# Patient Record
Sex: FEMALE | Race: WHITE | NOT HISPANIC OR LATINO
[De-identification: names, ages, dates, MRNs, and addresses within clinical notes are randomized per-mention and may not be internally consistent; named-entity substitution may affect disease eponyms.]

---

## 2017-12-18 ENCOUNTER — RESULT REVIEW (OUTPATIENT)
Age: 77
End: 2017-12-18

## 2020-12-14 PROBLEM — Z00.00 ENCOUNTER FOR PREVENTIVE HEALTH EXAMINATION: Status: ACTIVE | Noted: 2020-12-14

## 2020-12-21 ENCOUNTER — EMERGENCY (EMERGENCY)
Facility: HOSPITAL | Age: 80
LOS: 1 days | Discharge: HOME | End: 2020-12-21
Attending: EMERGENCY MEDICINE | Admitting: EMERGENCY MEDICINE
Payer: MEDICARE

## 2020-12-21 ENCOUNTER — OUTPATIENT (OUTPATIENT)
Dept: OUTPATIENT SERVICES | Facility: HOSPITAL | Age: 80
LOS: 1 days | End: 2020-12-21
Payer: MEDICARE

## 2020-12-21 ENCOUNTER — APPOINTMENT (OUTPATIENT)
Dept: CT IMAGING | Facility: CLINIC | Age: 80
End: 2020-12-21
Payer: MEDICARE

## 2020-12-21 VITALS
DIASTOLIC BLOOD PRESSURE: 96 MMHG | TEMPERATURE: 98 F | OXYGEN SATURATION: 99 % | SYSTOLIC BLOOD PRESSURE: 195 MMHG | RESPIRATION RATE: 18 BRPM | WEIGHT: 149.91 LBS | HEIGHT: 64 IN | HEART RATE: 96 BPM

## 2020-12-21 VITALS
SYSTOLIC BLOOD PRESSURE: 151 MMHG | OXYGEN SATURATION: 99 % | TEMPERATURE: 98 F | RESPIRATION RATE: 18 BRPM | DIASTOLIC BLOOD PRESSURE: 72 MMHG | HEART RATE: 80 BPM

## 2020-12-21 DIAGNOSIS — Z00.8 ENCOUNTER FOR OTHER GENERAL EXAMINATION: ICD-10-CM

## 2020-12-21 PROCEDURE — 82565 ASSAY OF CREATININE: CPT

## 2020-12-21 PROCEDURE — 70491 CT SOFT TISSUE NECK W/DYE: CPT

## 2020-12-21 PROCEDURE — 96374 THER/PROPH/DIAG INJ IV PUSH: CPT

## 2020-12-21 PROCEDURE — 99284 EMERGENCY DEPT VISIT MOD MDM: CPT

## 2020-12-21 PROCEDURE — 99284 EMERGENCY DEPT VISIT MOD MDM: CPT | Mod: 25

## 2020-12-21 PROCEDURE — 70491 CT SOFT TISSUE NECK W/DYE: CPT | Mod: 26

## 2020-12-21 RX ORDER — DIPHENHYDRAMINE HCL 50 MG
1 CAPSULE ORAL
Qty: 9 | Refills: 0
Start: 2020-12-21 | End: 2020-12-23

## 2020-12-21 RX ORDER — FAMOTIDINE 10 MG/ML
20 INJECTION INTRAVENOUS ONCE
Refills: 0 | Status: COMPLETED | OUTPATIENT
Start: 2020-12-21 | End: 2020-12-21

## 2020-12-21 RX ORDER — FAMOTIDINE 10 MG/ML
1 INJECTION INTRAVENOUS
Qty: 10 | Refills: 0
Start: 2020-12-21 | End: 2020-12-30

## 2020-12-21 RX ADMIN — FAMOTIDINE 20 MILLIGRAM(S): 10 INJECTION INTRAVENOUS at 14:12

## 2020-12-21 NOTE — ED PROVIDER NOTE - NSFOLLOWUPINSTRUCTIONS_ED_ALL_ED_FT
1. FOLLOW UP WITH YOUR PRIMARY DOCTOR IN 24-48 HOURS.   2. FOLLOW UP WITH ALL SPECIALIST DISCUSSED DURING YOUR VISIT.   3. TAKE ALL MEDICATIONS PRESCRIBED IN THE ER IF ANY ARE PRESCRIBED. CONTINUE YOUR HOME MEDICATIONS UNLESS OTHERWISE ADVISED DIFFERENTLY.   4. RETURN FOR WORSENING SYMPTOMS OR CONCERNS INCLUDING BUT NOT LIMITED TO FEVER, CHEST PAIN, OR TROUBLE BREATHING OR ANY OTHER CONCERNS  YOU NOW HAVE AN ALLERGIC TO IV CONTRAST. DO NOT TAKE IV CONTRAST.   TAKE PREDNISONE 40MG ONCE DAILY FOR 5 DAYS  TAKE BENADRYL 25MG EVERY 8 HOURS AS NEEDED FOR ITCHING BE AWARE IT WILL MAKE TO ITCHY  TAKE PEPCID 20MG ONCE DAILY FOR 10 DAYS              GENERAL ALLERGIC REACTION - AfterCare(R) Instructions(ER/ED)           General Allergic Reaction    WHAT YOU NEED TO KNOW:    An allergic reaction is your body's response to an allergen. Allergens include medicines, food, insect stings, animal dander, mold, latex, chemicals, and dust mites. Pollen from trees, grass, and weeds can also cause an allergic reaction. An allergic reaction can range from mild to severe.    DISCHARGE INSTRUCTIONS:    Call 911 for signs or symptoms of anaphylaxis, such as trouble breathing, swelling in your mouth or throat, or wheezing. You may also have itching, a rash, hives, or feel like you are going to faint.    Return to the emergency department if:   •You have a skin rash, hives, swelling, or itching that is starting to get worse.      •Your throat tightens, or your lips or tongue swell.      •You have trouble swallowing or speaking.      •You have worsening nausea, diarrhea, or abdominal cramps, or you are vomiting.      •You have chest pain or tightness.      Contact your healthcare provider if:   •You have questions or concerns about your condition or care.          Medicines: You may need any of the following:   •Medicines may be given to relieve certain allergy symptoms such as itching, sneezing, and swelling. You may take them as a pill or use drops in your nose or eyes. Topical treatments may be given to put directly on your skin to help decrease itching or swelling.      •Epinephrine may be prescribed if you are at risk for anaphylaxis. This is a severe allergic reaction that can be life-threatening. Your healthcare provider will tell you if you need to keep epinephrine with you. You will be taught when and how to use it.      •Take your medicine as directed. Contact your healthcare provider if you think your medicine is not helping or if you have side effects. Tell him of her if you are allergic to any medicine. Keep a list of the medicines, vitamins, and herbs you take. Include the amounts, and when and why you take them. Bring the list or the pill bottles to follow-up visits. Carry your medicine list with you in case of an emergency.      Follow up with your healthcare provider as directed: Write down your questions so you remember to ask them during your visits.     Manage your symptoms:   •Avoid allergens. You may need to have allergy testing with your healthcare provider or a specialist to find your allergens.      •Use cold compresses on your skin or eyes. This will help soothe skin or eyes affected by the allergic reaction. You can make a cold compress by soaking a washcloth in cool water. Wring out the extra water before you apply the washcloth.      •Rinse your nasal passages with a saline solution. Daily rinsing may help clear allergens out of your nose. Use distilled water if possible. You can also boil tap water and then let it cool before you use it. Do not use tap water without boiling it first.      •Do not smoke. Nicotine and other chemicals in cigarettes and cigars can make an allergic reaction worse, and can also cause lung damage. Ask your healthcare provider for information if you currently smoke and need help to quit. E-cigarettes or smokeless tobacco still contain nicotine. Talk to your healthcare provider before you use these products.          © Copyright [email or web address] 2020           back to top                          © Copyright [email or web address] 2020

## 2020-12-21 NOTE — ED PROVIDER NOTE - NS_ ATTENDINGSCRIBEDETAILS _ED_A_ED_FT
Juan Aguiar MD - The scribe's documentation has been prepared under my direction and personally reviewed by me in its entirety. I confirm that the note above accurately reflects all work, treatment, procedures, and medical decision making performed by me.

## 2020-12-21 NOTE — ED ADULT TRIAGE NOTE - CHIEF COMPLAINT QUOTE
sent from radiology for allergic reaction to IV dye, had throat swelling, sob, given Epi inj, Benadryl, Solumedrol IV

## 2020-12-21 NOTE — ED PROVIDER NOTE - OBJECTIVE STATEMENT
pt is a 81yo female with pmhx of hypothyroid, dementia bib ems with allergic reaction. pt was having a ct soft tissue neck with ivc and started to get throat discomfort with tongue swelling and diffuse body itching. pt was given 1 epi pen and 25mg po benadryl  in facility and 125mg iv solumedrol per ems. pt reports since tx symptoms have improved. pt denies fever, cp ,sob, trouble swallowing, rash.

## 2020-12-21 NOTE — ED PROVIDER NOTE - CARE PROVIDER_API CALL
EBONIE SOLORIO A  Internal Medicine  [address] [place] [address], [address] [place] [address]  Phone: [phone]  Fax: [phone]  Follow Up Time: 1-3 Days

## 2020-12-21 NOTE — ED PROVIDER NOTE - PROGRESS NOTE DETAILS
81 y/o female with no pertinent PMHx presents to the ED s/p allergic reaction. Pt reports she was at outpatient radiology and receiving ct scan, injected with contrast which provoked allergic rxn. Pt reports throat swelling and SOB. Per EMS, pt given Epi injection, Benadryl, aand Solumedrol IV. Pstates condition improving, states she "feels tired". No other complaints at this time.  PE: no acute distress, Heart S1 S2 RRR, Lungs CTA. pt improved. advised pmd follow up. will rx prednisone , pepcid and benadryl. pt advised to follow up with pmd regarding abnormal results. All questions answered and concerns addressed. pt verbalized understanding and agreement with plan and dx. pt advised on next step and when/where to follow up. pt advised on all take home and otc medications. pt advised to follow up with PMD. pt advised to return to ed for worsenng symptoms including fever, cp, sob. will dc.

## 2020-12-21 NOTE — ED PROVIDER NOTE - PATIENT PORTAL LINK FT
You can access the FollowMyHealth Patient Portal offered by [facility] by registering at the following website: [email or web address]. By joining [employer]’s FollowMyHealth portal, you will also be able to view your health information using other applications (apps) compatible with our system.

## 2020-12-21 NOTE — ED PROVIDER NOTE - CLINICAL SUMMARY MEDICAL DECISION MAKING FREE TEXT BOX
pt is a 81yo female with pmhx of hypothyroid, dementia bib ems with allergic reaction. pt was having a ct soft tissue neck with ivc and started to get throat discomfort with tongue swelling and diffuse body itching. pt was given 1 epi pen and 25mg po benadryl  in facility and 125mg iv solumedrol per ems. pt reports since tx symptoms have improved. will give pepcid and observe. pt advised she is now allergic to IV CONTRAST.

## 2020-12-21 NOTE — ED ADULT NURSE NOTE - OBJECTIVE STATEMENT
81 yo female presents to the ED with , swollen tongue and difficulty swallowing s/p IV contrast at radiology outpatient, reports as soon as the IV contrast was pushed she started getting tremors and then felt her tongue begin to swell , pt reports radiology tech immediately stopped the contrast  and gave her medication. Pt denies any difficulty breathing, no wheezing heard upon auscultation , EMS reports giving pt benadryl and solumderol and pt received epi pen at radiology dept . 81 yo female presents to the ED with , swollen tongue and difficulty swallowing s/p IV contrast at radiology outpatient, reports as soon as the IV contrast was pushed she started getting tremors and then felt her tongue begin to swell , pt reports radiology tech immediately stopped the contrast  and gave her medication. Pt denies any difficulty breathing, no wheezing heard upon auscultation , EMS reports giving pt benadryl and solumedrol and pt received epi pen at radiology dept . Will medicate as per orders and continue to monitor.

## 2020-12-21 NOTE — ED ADULT NURSE NOTE - CHPI ED NUR SYMPTOMS NEG
no congestion/no difficulty breathing/no nausea/no shortness of breath/no throat itching/no vomiting/no wheezing

## 2020-12-22 NOTE — ED POST DISCHARGE NOTE - RESULT SUMMARY
pt called to clarify prescriptions. advised prednisone 40mg once daily for 5 days and pepcid 20mg once daily for 10 days. pt understood.

## 2021-01-04 PROBLEM — F03.90 UNSPECIFIED DEMENTIA, UNSPECIFIED SEVERITY, WITHOUT BEHAVIORAL DISTURBANCE, PSYCHOTIC DISTURBANCE, MOOD DISTURBANCE, AND ANXIETY: Chronic | Status: ACTIVE | Noted: 2020-12-21

## 2021-01-04 PROBLEM — F03.90 UNSPECIFIED DEMENTIA WITHOUT BEHAVIORAL DISTURBANCE: Chronic | Status: ACTIVE | Noted: 2020-12-21

## 2021-01-04 PROBLEM — E03.9 HYPOTHYROIDISM, UNSPECIFIED: Chronic | Status: ACTIVE | Noted: 2020-12-21

## 2021-01-06 ENCOUNTER — APPOINTMENT (OUTPATIENT)
Dept: GASTROENTEROLOGY | Facility: CLINIC | Age: 81
End: 2021-01-06
Payer: MEDICARE

## 2021-01-06 VITALS
OXYGEN SATURATION: 98 % | BODY MASS INDEX: 24.6 KG/M2 | WEIGHT: 122 LBS | SYSTOLIC BLOOD PRESSURE: 132 MMHG | RESPIRATION RATE: 14 BRPM | HEART RATE: 83 BPM | DIASTOLIC BLOOD PRESSURE: 70 MMHG | HEIGHT: 59 IN

## 2021-01-06 DIAGNOSIS — Z78.9 OTHER SPECIFIED HEALTH STATUS: ICD-10-CM

## 2021-01-06 DIAGNOSIS — Z85.850 PERSONAL HISTORY OF MALIGNANT NEOPLASM OF THYROID: ICD-10-CM

## 2021-01-06 DIAGNOSIS — Z82.49 FAMILY HISTORY OF ISCHEMIC HEART DISEASE AND OTHER DISEASES OF THE CIRCULATORY SYSTEM: ICD-10-CM

## 2021-01-06 DIAGNOSIS — Z86.39 PERSONAL HISTORY OF OTHER ENDOCRINE, NUTRITIONAL AND METABOLIC DISEASE: ICD-10-CM

## 2021-01-06 DIAGNOSIS — Z86.79 PERSONAL HISTORY OF OTHER DISEASES OF THE CIRCULATORY SYSTEM: ICD-10-CM

## 2021-01-06 DIAGNOSIS — Z87.19 PERSONAL HISTORY OF OTHER DISEASES OF THE DIGESTIVE SYSTEM: ICD-10-CM

## 2021-01-06 DIAGNOSIS — K57.32 DIVERTICULITIS OF LARGE INTESTINE W/OUT PERFORATION OR ABSCESS W/OUT BLEEDING: ICD-10-CM

## 2021-01-06 PROCEDURE — 99203 OFFICE O/P NEW LOW 30 MIN: CPT

## 2021-01-06 PROCEDURE — 99072 ADDL SUPL MATRL&STAF TM PHE: CPT

## 2021-01-06 RX ORDER — SERTRALINE HYDROCHLORIDE 25 MG/1
TABLET, FILM COATED ORAL
Refills: 0 | Status: ACTIVE | COMMUNITY

## 2021-01-06 RX ORDER — LEVOTHYROXINE SODIUM 0.17 MG/1
TABLET ORAL
Refills: 0 | Status: ACTIVE | COMMUNITY

## 2021-01-06 RX ORDER — AMLODIPINE BESYLATE 5 MG/1
TABLET ORAL
Refills: 0 | Status: ACTIVE | COMMUNITY

## 2021-01-06 RX ORDER — MEMANTINE HYDROCHLORIDE 5 MG/1
TABLET ORAL
Refills: 0 | Status: ACTIVE | COMMUNITY

## 2021-01-07 NOTE — PHYSICAL EXAM
[General Appearance - Alert] : alert [General Appearance - In No Acute Distress] : in no acute distress [General Appearance - Well Nourished] : well nourished [Sclera] : the sclera and conjunctiva were normal [Extraocular Movements] : extraocular movements were intact [Outer Ear] : the ears and nose were normal in appearance [Neck Appearance] : the appearance of the neck was normal [Neck Cervical Mass (___cm)] : no neck mass was observed [Auscultation Breath Sounds / Voice Sounds] : lungs were clear to auscultation bilaterally [Heart Rate And Rhythm] : heart rate was normal and rhythm regular [Heart Sounds] : normal S1 and S2 [Heart Sounds Gallop] : no gallops [Murmurs] : no murmurs [Heart Sounds Pericardial Friction Rub] : no pericardial rub [Bowel Sounds] : normal bowel sounds [Abdomen Soft] : soft [Abdomen Tenderness] : non-tender [] : no hepato-splenomegaly [Abdomen Mass (___ Cm)] : no abdominal mass palpated [Cervical Lymph Nodes Enlarged Posterior Bilaterally] : posterior cervical [Cervical Lymph Nodes Enlarged Anterior Bilaterally] : anterior cervical [Supraclavicular Lymph Nodes Enlarged Bilaterally] : supraclavicular [Abnormal Walk] : normal gait [Nail Clubbing] : no clubbing  or cyanosis of the fingernails [Oriented To Time, Place, And Person] : oriented to person, place, and time [Impaired Insight] : insight and judgment were intact [Affect] : the affect was normal [FreeTextEntry1] : Slight hard of hearing

## 2021-01-07 NOTE — HISTORY OF PRESENT ILLNESS
[de-identified] : 79 y/o with Hx of Alzheimers (recently diagnosed), thyroid cancer s/p resection years ago who presents with complaints of chronic diarrhea.  Patient has no immediate family members.  She has a friend Mitchell, who is power of  (Gabriella says he is elderly).   Gabriella says she and her other friend are deciding on who will be health care proxy for the patient. \par \par The patient reports since the past couple of months she has been having a lot of diarrhea. She has three bowel movement a day - Stools are loose/watery.  Dark stools - but not black. No hematochezia. \par \par No night time symptoms.  \par \par No abdominal pain. \par No rectal pain. \par \par No constipation. \par \par Lost appetite. She has lost weight since the past year.  \par \par No nausea, no vomiting. \par No fever, no chills.  \par No heartburn. \par No dysphagia, \par No odynophagia. \par \par \par Two years ago as per patient's friend Gabriella, she was hospital at [facility] for bleeding ulcer, and diverticulitis.  \par \par She has had several colonoscopies - last one may have been 2 years as per patients friend.  Patient does not records of prior colonoscopy.   Patient does not having polyps in her colon ever. \par \par She had an upper endoscopy in the past but does not recall well.\par \par All other review of systems are negative.  Denies cardiac symptoms.

## 2021-01-07 NOTE — ASSESSMENT
[FreeTextEntry1] : IMPRESSIONS: \par Diarrhea since past couple of months \par Dark stools \par Abnormal weight loss\par History of ulcer \par History of diverticulitis\par Alzheimers dementia - she does not have a health care proxy, power of  is a family friend Mitchell. \par Hx of thyroid cancer s/p resection \par \par \par \par PLAN: \par I had a detail discussion with the patient and her friend Gabriella who is considering becoming her health care proxy with regards to the broad differential of diarrhea, abnormal weight loss and dark stools.  \par \par I have advised the patient to arrange for a colonoscopy to rule out colon polyps, colorectal cancer etc. under monitored anesthesia care.  Risks such as perforation  (4 in 10,000) requiring surgery, bleeding (8 in 10,000), infection, diverticulitis, colitis, missed colon cancer (2% to 6%), internal organ injury, etc, risks of bowel prep including colitis, syncope, adverse reaction to medication etc. and risks of anesthesia including cardiopulmonary compromise were discussed with patient and Gabriella.  Alternative test to a colonoscopy including CT colonography, and routine CT of the abd/pelvis with IV contrast were discussed - she would need to be premedicated since contrast allergy - limitations of imaging studies were reviewed.  Patient and Gabriella verbalized understanding and would like to discuss among each other and Mitchell who is power , she has no immediate family.  Gabriella, or Mitchell will call us with their decisions.   \par \par Will order blood work and stool studies to r/o anemia, infectious diarrhea etc.  Staff will call Gabriella to make arrangements.

## 2021-02-04 ENCOUNTER — NON-APPOINTMENT (OUTPATIENT)
Age: 81
End: 2021-02-04

## 2021-02-04 LAB
ALBUMIN SERPL ELPH-MCNC: 4.2 G/DL
ALP BLD-CCNC: 119 U/L
ALT SERPL-CCNC: 28 U/L
AMYLASE/CREAT SERPL: 42 U/L
ANION GAP SERPL CALC-SCNC: 11 MMOL/L
AST SERPL-CCNC: 33 U/L
BASOPHILS # BLD AUTO: 0.06 K/UL
BASOPHILS NFR BLD AUTO: 0.9 %
BILIRUB SERPL-MCNC: 0.5 MG/DL
BUN SERPL-MCNC: 12 MG/DL
CALCIUM SERPL-MCNC: 9.4 MG/DL
CHLORIDE SERPL-SCNC: 101 MMOL/L
CO2 SERPL-SCNC: 28 MMOL/L
CREAT SERPL-MCNC: 0.76 MG/DL
CRP SERPL-MCNC: 1.06 MG/DL
EOSINOPHIL # BLD AUTO: 0.06 K/UL
EOSINOPHIL NFR BLD AUTO: 0.9 %
ERYTHROCYTE [SEDIMENTATION RATE] IN BLOOD BY WESTERGREN METHOD: 37 MM/HR
G LAMBLIA AG STL QL: NORMAL
HCT VFR BLD CALC: 41.3 %
HGB BLD-MCNC: 12.4 G/DL
IMM GRANULOCYTES NFR BLD AUTO: 0.4 %
LPL SERPL-CCNC: 17 U/L
LYMPHOCYTES # BLD AUTO: 1.66 K/UL
LYMPHOCYTES NFR BLD AUTO: 23.9 %
MAN DIFF?: NORMAL
MCHC RBC-ENTMCNC: 27.7 PG
MCHC RBC-ENTMCNC: 30 GM/DL
MCV RBC AUTO: 92.4 FL
MONOCYTES # BLD AUTO: 0.47 K/UL
MONOCYTES NFR BLD AUTO: 6.8 %
NEUTROPHILS # BLD AUTO: 4.66 K/UL
NEUTROPHILS NFR BLD AUTO: 67.1 %
PLATELET # BLD AUTO: 288 K/UL
POTASSIUM SERPL-SCNC: 5.3 MMOL/L
PROT SERPL-MCNC: 6.8 G/DL
RBC # BLD: 4.47 M/UL
RBC # FLD: 15.1 %
SODIUM SERPL-SCNC: 139 MMOL/L
T4 FREE SERPL-MCNC: 1.3 NG/DL
TSH SERPL-ACNC: 1.25 UIU/ML
WBC # FLD AUTO: 6.94 K/UL
WRIGHT STN STL: NEGATIVE

## 2021-02-05 LAB
C DIFF TOX GENS STL QL NAA+PROBE: NORMAL
CDIFF BY PCR: NOT DETECTED
DEPRECATED O AND P PREP STL: ABNORMAL
ENDOMYSIUM IGA SER QL: NEGATIVE
ENDOMYSIUM IGA TITR SER: NORMAL
GLIADIN IGA SER QL: <5 UNITS
GLIADIN IGG SER QL: <5 UNITS
GLIADIN PEPTIDE IGA SER-ACNC: NEGATIVE
GLIADIN PEPTIDE IGG SER-ACNC: NEGATIVE
TTG IGA SER IA-ACNC: <1.2 U/ML
TTG IGA SER-ACNC: NEGATIVE
TTG IGG SER IA-ACNC: 3.3 U/ML
TTG IGG SER IA-ACNC: NEGATIVE

## 2021-02-08 LAB
BACTERIA STL CULT: NORMAL
C DIFF TOX B STL QL CT TISS CULT: NORMAL
FAT STL QN: NORMAL
FAT STL QN: NORMAL
Lab: NORMAL

## 2021-02-16 ENCOUNTER — NON-APPOINTMENT (OUTPATIENT)
Age: 81
End: 2021-02-16

## 2021-02-16 LAB — CALPROTECTIN FECAL: 542 UG/G

## 2021-02-24 ENCOUNTER — NON-APPOINTMENT (OUTPATIENT)
Age: 81
End: 2021-02-24

## 2021-03-02 ENCOUNTER — NON-APPOINTMENT (OUTPATIENT)
Age: 81
End: 2021-03-02

## 2021-03-02 DIAGNOSIS — K52.9 NONINFECTIVE GASTROENTERITIS AND COLITIS, UNSPECIFIED: ICD-10-CM

## 2021-03-02 DIAGNOSIS — R63.4 ABNORMAL WEIGHT LOSS: ICD-10-CM

## 2021-03-17 ENCOUNTER — NON-APPOINTMENT (OUTPATIENT)
Age: 81
End: 2021-03-17

## 2021-03-31 ENCOUNTER — APPOINTMENT (OUTPATIENT)
Dept: CT IMAGING | Facility: CLINIC | Age: 81
End: 2021-03-31
Payer: MEDICARE

## 2021-03-31 ENCOUNTER — OUTPATIENT (OUTPATIENT)
Dept: OUTPATIENT SERVICES | Facility: HOSPITAL | Age: 81
LOS: 1 days | End: 2021-03-31
Payer: MEDICARE

## 2021-03-31 DIAGNOSIS — Z00.8 ENCOUNTER FOR OTHER GENERAL EXAMINATION: ICD-10-CM

## 2021-03-31 DIAGNOSIS — R19.5 OTHER FECAL ABNORMALITIES: ICD-10-CM

## 2021-03-31 PROCEDURE — 74261 CT COLONOGRAPHY DX: CPT | Mod: 26

## 2021-03-31 PROCEDURE — 74261 CT COLONOGRAPHY DX: CPT

## 2021-04-02 ENCOUNTER — NON-APPOINTMENT (OUTPATIENT)
Age: 81
End: 2021-04-02

## 2021-04-06 ENCOUNTER — NON-APPOINTMENT (OUTPATIENT)
Age: 81
End: 2021-04-06

## 2021-04-08 ENCOUNTER — NON-APPOINTMENT (OUTPATIENT)
Age: 81
End: 2021-04-08

## 2021-04-09 DIAGNOSIS — R19.5 OTHER FECAL ABNORMALITIES: ICD-10-CM

## 2021-04-09 RX ORDER — SODIUM SULFATE, POTASSIUM SULFATE, MAGNESIUM SULFATE 17.5; 3.13; 1.6 G/ML; G/ML; G/ML
17.5-3.13-1.6 SOLUTION, CONCENTRATE ORAL
Qty: 1 | Refills: 0 | Status: ACTIVE | COMMUNITY
Start: 2021-04-09 | End: 1900-01-01

## 2021-04-16 ENCOUNTER — APPOINTMENT (OUTPATIENT)
Dept: DISASTER EMERGENCY | Facility: CLINIC | Age: 81
End: 2021-04-16

## 2021-04-16 DIAGNOSIS — Z01.818 ENCOUNTER FOR OTHER PREPROCEDURAL EXAMINATION: ICD-10-CM

## 2021-04-17 LAB — SARS-COV-2 N GENE NPH QL NAA+PROBE: NOT DETECTED

## 2021-04-18 ENCOUNTER — NON-APPOINTMENT (OUTPATIENT)
Age: 81
End: 2021-04-18

## 2021-04-18 ENCOUNTER — TRANSCRIPTION ENCOUNTER (OUTPATIENT)
Age: 81
End: 2021-04-18

## 2021-04-19 ENCOUNTER — APPOINTMENT (OUTPATIENT)
Dept: GASTROENTEROLOGY | Facility: HOSPITAL | Age: 81
End: 2021-04-19

## 2021-04-19 ENCOUNTER — RESULT REVIEW (OUTPATIENT)
Age: 81
End: 2021-04-19

## 2021-04-19 ENCOUNTER — OUTPATIENT (OUTPATIENT)
Dept: OUTPATIENT SERVICES | Facility: HOSPITAL | Age: 81
LOS: 1 days | End: 2021-04-19
Payer: MEDICARE

## 2021-04-19 DIAGNOSIS — K57.32 DIVERTICULITIS OF LARGE INTESTINE WITHOUT PERFORATION OR ABSCESS WITHOUT BLEEDING: ICD-10-CM

## 2021-04-19 PROCEDURE — 45380 COLONOSCOPY AND BIOPSY: CPT

## 2021-04-19 PROCEDURE — 88305 TISSUE EXAM BY PATHOLOGIST: CPT

## 2021-04-19 PROCEDURE — 45378 DIAGNOSTIC COLONOSCOPY: CPT

## 2021-04-19 PROCEDURE — 88305 TISSUE EXAM BY PATHOLOGIST: CPT | Mod: 26

## 2021-04-21 ENCOUNTER — NON-APPOINTMENT (OUTPATIENT)
Age: 81
End: 2021-04-21

## 2021-05-18 ENCOUNTER — APPOINTMENT (OUTPATIENT)
Dept: GASTROENTEROLOGY | Facility: HOSPITAL | Age: 81
End: 2021-05-18

## 2024-10-22 ENCOUNTER — APPOINTMENT (OUTPATIENT)
Dept: OTOLARYNGOLOGY | Facility: CLINIC | Age: 84
End: 2024-10-22
Payer: MEDICARE

## 2024-10-22 VITALS — DIASTOLIC BLOOD PRESSURE: 70 MMHG | HEART RATE: 69 BPM | SYSTOLIC BLOOD PRESSURE: 151 MMHG | OXYGEN SATURATION: 97 %

## 2024-10-22 PROCEDURE — 31231 NASAL ENDOSCOPY DX: CPT

## 2024-10-22 PROCEDURE — 99204 OFFICE O/P NEW MOD 45 MIN: CPT | Mod: 25

## 2024-10-23 ENCOUNTER — NON-APPOINTMENT (OUTPATIENT)
Age: 84
End: 2024-10-23

## 2024-10-23 ENCOUNTER — APPOINTMENT (OUTPATIENT)
Dept: OTOLARYNGOLOGY | Facility: CLINIC | Age: 84
End: 2024-10-23
Payer: MEDICARE

## 2024-10-23 VITALS
OXYGEN SATURATION: 98 % | HEIGHT: 59 IN | SYSTOLIC BLOOD PRESSURE: 123 MMHG | BODY MASS INDEX: 25.2 KG/M2 | DIASTOLIC BLOOD PRESSURE: 80 MMHG | HEART RATE: 77 BPM | WEIGHT: 125 LBS

## 2024-10-23 DIAGNOSIS — C30.0 MALIGNANT NEOPLASM OF NASAL CAVITY: ICD-10-CM

## 2024-10-23 PROCEDURE — 99214 OFFICE O/P EST MOD 30 MIN: CPT

## 2024-10-24 ENCOUNTER — NON-APPOINTMENT (OUTPATIENT)
Age: 84
End: 2024-10-24

## 2024-10-25 ENCOUNTER — OUTPATIENT (OUTPATIENT)
Dept: OUTPATIENT SERVICES | Facility: HOSPITAL | Age: 84
LOS: 1 days | Discharge: HOME | End: 2024-10-25
Payer: MEDICARE

## 2024-10-28 ENCOUNTER — RESULT REVIEW (OUTPATIENT)
Age: 84
End: 2024-10-28

## 2024-10-28 ENCOUNTER — APPOINTMENT (OUTPATIENT)
Dept: HEMATOLOGY ONCOLOGY | Facility: CLINIC | Age: 84
End: 2024-10-28

## 2024-10-28 ENCOUNTER — NON-APPOINTMENT (OUTPATIENT)
Age: 84
End: 2024-10-28

## 2024-10-28 PROCEDURE — 88321 CONSLTJ&REPRT SLD PREP ELSWR: CPT

## 2024-10-29 LAB — SURGICAL PATHOLOGY STUDY: SIGNIFICANT CHANGE UP

## 2024-10-30 ENCOUNTER — RESULT REVIEW (OUTPATIENT)
Age: 84
End: 2024-10-30

## 2024-10-30 ENCOUNTER — APPOINTMENT (OUTPATIENT)
Dept: ULTRASOUND IMAGING | Facility: IMAGING CENTER | Age: 84
End: 2024-10-30
Payer: MEDICARE

## 2024-10-30 ENCOUNTER — OUTPATIENT (OUTPATIENT)
Dept: OUTPATIENT SERVICES | Facility: HOSPITAL | Age: 84
LOS: 1 days | End: 2024-10-30
Payer: MEDICARE

## 2024-10-30 DIAGNOSIS — C30.0 MALIGNANT NEOPLASM OF NASAL CAVITY: ICD-10-CM

## 2024-10-30 PROCEDURE — 88172 CYTP DX EVAL FNA 1ST EA SITE: CPT

## 2024-10-30 PROCEDURE — 20206 BIOPSY MUSCLE PERQ NEEDLE: CPT

## 2024-10-30 PROCEDURE — 88305 TISSUE EXAM BY PATHOLOGIST: CPT

## 2024-10-30 PROCEDURE — 88305 TISSUE EXAM BY PATHOLOGIST: CPT | Mod: 26

## 2024-10-30 PROCEDURE — 88173 CYTOPATH EVAL FNA REPORT: CPT

## 2024-10-30 PROCEDURE — 88173 CYTOPATH EVAL FNA REPORT: CPT | Mod: 26

## 2024-10-30 PROCEDURE — 76942 ECHO GUIDE FOR BIOPSY: CPT

## 2024-10-30 PROCEDURE — 88342 IMHCHEM/IMCYTCHM 1ST ANTB: CPT | Mod: 26

## 2024-10-30 PROCEDURE — 88342 IMHCHEM/IMCYTCHM 1ST ANTB: CPT

## 2024-10-30 PROCEDURE — 10005 FNA BX W/US GDN 1ST LES: CPT

## 2024-11-02 ENCOUNTER — OUTPATIENT (OUTPATIENT)
Dept: OUTPATIENT SERVICES | Facility: HOSPITAL | Age: 84
LOS: 1 days | Discharge: HOME | End: 2024-11-02

## 2024-11-02 LAB — NON-GYNECOLOGICAL CYTOLOGY STUDY: SIGNIFICANT CHANGE UP

## 2024-11-12 ENCOUNTER — NON-APPOINTMENT (OUTPATIENT)
Age: 84
End: 2024-11-12

## 2024-11-12 ENCOUNTER — APPOINTMENT (OUTPATIENT)
Dept: HEMATOLOGY ONCOLOGY | Facility: CLINIC | Age: 84
End: 2024-11-12

## 2024-11-12 VITALS
SYSTOLIC BLOOD PRESSURE: 164 MMHG | BODY MASS INDEX: 24.44 KG/M2 | WEIGHT: 121.25 LBS | OXYGEN SATURATION: 99 % | HEIGHT: 59 IN | HEART RATE: 71 BPM | DIASTOLIC BLOOD PRESSURE: 64 MMHG

## 2024-11-12 DIAGNOSIS — C30.0 MALIGNANT NEOPLASM OF NASAL CAVITY: ICD-10-CM

## 2024-11-12 PROCEDURE — G2212 PROLONG OUTPT/OFFICE VIS: CPT

## 2024-11-12 PROCEDURE — G2211 COMPLEX E/M VISIT ADD ON: CPT

## 2024-11-12 PROCEDURE — 99205 OFFICE O/P NEW HI 60 MIN: CPT

## 2024-11-19 ENCOUNTER — APPOINTMENT (OUTPATIENT)
Dept: MRI IMAGING | Facility: CLINIC | Age: 84
End: 2024-11-19
Payer: MEDICARE

## 2024-11-19 PROCEDURE — 70553 MRI BRAIN STEM W/O & W/DYE: CPT

## 2024-11-19 PROCEDURE — A9585: CPT

## 2024-11-21 LAB
ALBUMIN SERPL ELPH-MCNC: 4.4 G/DL
ALP BLD-CCNC: 117 U/L
ALT SERPL-CCNC: 7 U/L
ANION GAP SERPL CALC-SCNC: 12 MMOL/L
AST SERPL-CCNC: 19 U/L
BILIRUB SERPL-MCNC: 0.2 MG/DL
BUN SERPL-MCNC: 14 MG/DL
CALCIUM SERPL-MCNC: 9.3 MG/DL
CHLORIDE SERPL-SCNC: 103 MMOL/L
CO2 SERPL-SCNC: 27 MMOL/L
CREAT SERPL-MCNC: 0.66 MG/DL
EGFR: 86 ML/MIN/1.73M2
HBV CORE IGM SER QL: NONREACTIVE
HBV SURFACE AB SER QL: REACTIVE
HBV SURFACE AB SERPL IA-ACNC: 305.5 MIU/ML
HBV SURFACE AG SER QL: NONREACTIVE
HCV AB SER QL: NONREACTIVE
HCV S/CO RATIO: 0.07 S/CO
LDH SERPL-CCNC: 227 U/L
MAGNESIUM SERPL-MCNC: 2.1 MG/DL
POTASSIUM SERPL-SCNC: 5 MMOL/L
PROT SERPL-MCNC: 7.1 G/DL
SODIUM SERPL-SCNC: 141 MMOL/L

## 2024-11-26 ENCOUNTER — RESULT REVIEW (OUTPATIENT)
Age: 84
End: 2024-11-26

## 2024-11-26 ENCOUNTER — APPOINTMENT (OUTPATIENT)
Age: 84
End: 2024-11-26

## 2024-11-27 DIAGNOSIS — Z51.11 ENCOUNTER FOR ANTINEOPLASTIC CHEMOTHERAPY: ICD-10-CM

## 2024-11-27 LAB
ALBUMIN SERPL ELPH-MCNC: 4 G/DL — SIGNIFICANT CHANGE UP (ref 3.3–5)
ALP SERPL-CCNC: 114 U/L — SIGNIFICANT CHANGE UP (ref 40–120)
ALT FLD-CCNC: 9 U/L — LOW (ref 10–45)
ANION GAP SERPL CALC-SCNC: 12 MMOL/L — SIGNIFICANT CHANGE UP (ref 5–17)
AST SERPL-CCNC: 23 U/L — SIGNIFICANT CHANGE UP (ref 10–40)
BILIRUB SERPL-MCNC: 0.4 MG/DL — SIGNIFICANT CHANGE UP (ref 0.2–1.2)
BUN SERPL-MCNC: 16 MG/DL — SIGNIFICANT CHANGE UP (ref 7–23)
CALCIUM SERPL-MCNC: 9.5 MG/DL — SIGNIFICANT CHANGE UP (ref 8.4–10.5)
CHLORIDE SERPL-SCNC: 102 MMOL/L — SIGNIFICANT CHANGE UP (ref 96–108)
CO2 SERPL-SCNC: 26 MMOL/L — SIGNIFICANT CHANGE UP (ref 22–31)
CREAT SERPL-MCNC: 0.66 MG/DL — SIGNIFICANT CHANGE UP (ref 0.5–1.3)
EGFR: 86 ML/MIN/1.73M2 — SIGNIFICANT CHANGE UP
GLUCOSE SERPL-MCNC: 97 MG/DL — SIGNIFICANT CHANGE UP (ref 70–99)
POTASSIUM SERPL-MCNC: 5 MMOL/L — SIGNIFICANT CHANGE UP (ref 3.5–5.3)
POTASSIUM SERPL-SCNC: 5 MMOL/L — SIGNIFICANT CHANGE UP (ref 3.5–5.3)
PROT SERPL-MCNC: 7.2 G/DL — SIGNIFICANT CHANGE UP (ref 6–8.3)
SODIUM SERPL-SCNC: 140 MMOL/L — SIGNIFICANT CHANGE UP (ref 135–145)
TSH SERPL-MCNC: 3.5 UIU/ML — SIGNIFICANT CHANGE UP (ref 0.27–4.2)

## 2024-11-30 ENCOUNTER — APPOINTMENT (OUTPATIENT)
Dept: AFTER HOURS CARE | Facility: EMERGENCY ROOM | Age: 84
End: 2024-11-30
Payer: MEDICARE

## 2024-11-30 ENCOUNTER — EMERGENCY (EMERGENCY)
Facility: HOSPITAL | Age: 84
LOS: 1 days | Discharge: HOME | End: 2024-11-30
Attending: EMERGENCY MEDICINE
Payer: MEDICARE

## 2024-11-30 VITALS
OXYGEN SATURATION: 98 % | DIASTOLIC BLOOD PRESSURE: 64 MMHG | TEMPERATURE: 98 F | SYSTOLIC BLOOD PRESSURE: 146 MMHG | HEART RATE: 67 BPM | WEIGHT: 130.07 LBS | RESPIRATION RATE: 20 BRPM | HEIGHT: 58.66 IN

## 2024-11-30 VITALS
DIASTOLIC BLOOD PRESSURE: 86 MMHG | SYSTOLIC BLOOD PRESSURE: 145 MMHG | HEART RATE: 101 BPM | OXYGEN SATURATION: 98 % | RESPIRATION RATE: 18 BRPM

## 2024-11-30 DIAGNOSIS — R21 RASH AND OTHER NONSPECIFIC SKIN ERUPTION: ICD-10-CM

## 2024-11-30 PROCEDURE — 99284 EMERGENCY DEPT VISIT MOD MDM: CPT

## 2024-11-30 PROCEDURE — 96374 THER/PROPH/DIAG INJ IV PUSH: CPT

## 2024-11-30 PROCEDURE — 96375 TX/PRO/DX INJ NEW DRUG ADDON: CPT

## 2024-11-30 PROCEDURE — 99205 OFFICE O/P NEW HI 60 MIN: CPT | Mod: NC

## 2024-11-30 PROCEDURE — 99284 EMERGENCY DEPT VISIT MOD MDM: CPT | Mod: 25

## 2024-11-30 PROCEDURE — 96372 THER/PROPH/DIAG INJ SC/IM: CPT | Mod: XU

## 2024-11-30 RX ORDER — DEXAMETHASONE 1.5 MG/1
6 TABLET ORAL ONCE
Refills: 0 | Status: COMPLETED | OUTPATIENT
Start: 2024-11-30 | End: 2024-11-30

## 2024-11-30 RX ORDER — SODIUM CHLORIDE 9 MG/ML
1000 INJECTION, SOLUTION INTRAMUSCULAR; INTRAVENOUS; SUBCUTANEOUS ONCE
Refills: 0 | Status: COMPLETED | OUTPATIENT
Start: 2024-11-30 | End: 2024-11-30

## 2024-11-30 RX ORDER — DIPHENHYDRAMINE HCL 25 MG
25 CAPSULE ORAL ONCE
Refills: 0 | Status: COMPLETED | OUTPATIENT
Start: 2024-11-30 | End: 2024-11-30

## 2024-11-30 RX ORDER — EPINEPHRINE 1 MG/ML(1)
0.3 AMPUL (ML) INJECTION ONCE
Refills: 0 | Status: COMPLETED | OUTPATIENT
Start: 2024-11-30 | End: 2024-11-30

## 2024-11-30 RX ORDER — FAMOTIDINE 20 MG/1
20 TABLET, FILM COATED ORAL ONCE
Refills: 0 | Status: COMPLETED | OUTPATIENT
Start: 2024-11-30 | End: 2024-11-30

## 2024-11-30 RX ADMIN — SODIUM CHLORIDE 1000 MILLILITER(S): 9 INJECTION, SOLUTION INTRAMUSCULAR; INTRAVENOUS; SUBCUTANEOUS at 13:29

## 2024-11-30 RX ADMIN — DEXAMETHASONE 6 MILLIGRAM(S): 1.5 TABLET ORAL at 13:27

## 2024-11-30 RX ADMIN — Medication 25 MILLIGRAM(S): at 13:28

## 2024-11-30 RX ADMIN — FAMOTIDINE 20 MILLIGRAM(S): 20 TABLET, FILM COATED ORAL at 13:28

## 2024-11-30 RX ADMIN — Medication 0.3 MILLIGRAM(S): at 16:08

## 2024-11-30 NOTE — ED PROVIDER NOTE - CLINICAL SUMMARY MEDICAL DECISION MAKING FREE TEXT BOX
s/p immunotherapy , developed rash with vesicles in some areas , thickend plaques , mild erythema    referred here by heme/onc for IV steroids

## 2024-11-30 NOTE — ED ADULT NURSE NOTE - NSFALLUNIVINTERV_ED_ALL_ED
Bed/Stretcher in lowest position, wheels locked, appropriate side rails in place/Call bell, personal items and telephone in reach/Instruct patient to call for assistance before getting out of bed/chair/stretcher/Non-slip footwear applied when patient is off stretcher/[place] to call system/Physically safe environment - no spills, clutter or unnecessary equipment/Purposeful proactive rounding/Room/bathroom lighting operational, light cord in reach

## 2024-11-30 NOTE — ED ADULT NURSE NOTE - OBJECTIVE STATEMENT
Assumed pt care as lunch break covering RN.  Hx of melanoma being treated with immunotherapy.  Pt developed an itchy rash over a week ago.  Rash appearance changed and is now raised on arms, back and abdomen after recently receiving immunotherapy.  Oncologist sent pt to ED for IV steroids and benadryl.  Pt is alert and oriented X 4 with no respiratory difficulty or other distress present.

## 2024-11-30 NOTE — ED ADULT NURSE REASSESSMENT NOTE - NS ED NURSE REASSESS COMMENT FT1
Pt AOx4, resting comfortably in bed. Rash still present. Pt c/o itching, denies any pain or other accompanying sx. Respirations even and unlabored. Pt remains on tele monitor w/, bed locked and in lowest position.

## 2024-11-30 NOTE — ED PROVIDER NOTE - NSFOLLOWUPINSTRUCTIONS_ED_ALL_ED_FT
please follow up with dermatologist on monday as scheduled    can take 25 mg of benadryl every 6 hours  recommend aveeno bath

## 2024-11-30 NOTE — ED PROVIDER NOTE - PATIENT PORTAL LINK FT
You can access the FollowMyHealth Patient Portal offered by [facility] by registering at the following website: [email or web address]. By joining [employer]’s FollowMyHealth portal, you will also be able to view your health information using other applications (apps) compatible with our system.

## 2024-11-30 NOTE — ED ADULT TRIAGE NOTE - CHIEF COMPLAINT QUOTE
Pt arrives to ED  c/o  diffuse rash  thru out body after getting immunotherapy on Tuesday. Denies SOB or difficulty breathing

## 2024-12-04 ENCOUNTER — NON-APPOINTMENT (OUTPATIENT)
Age: 84
End: 2024-12-04

## 2024-12-04 ENCOUNTER — INPATIENT (INPATIENT)
Facility: HOSPITAL | Age: 84
LOS: 5 days | Discharge: HOME | End: 2024-12-10
Attending: STUDENT IN AN ORGANIZED HEALTH CARE EDUCATION/TRAINING PROGRAM | Admitting: STUDENT IN AN ORGANIZED HEALTH CARE EDUCATION/TRAINING PROGRAM
Payer: MEDICARE

## 2024-12-04 ENCOUNTER — EMERGENCY (EMERGENCY)
Facility: HOSPITAL | Age: 84
LOS: 1 days | Discharge: OTHER INSTITUTION | End: 2024-12-04
Attending: STUDENT IN AN ORGANIZED HEALTH CARE EDUCATION/TRAINING PROGRAM
Payer: MEDICARE

## 2024-12-04 VITALS
RESPIRATION RATE: 16 BRPM | HEART RATE: 79 BPM | SYSTOLIC BLOOD PRESSURE: 145 MMHG | DIASTOLIC BLOOD PRESSURE: 63 MMHG | WEIGHT: 127.87 LBS | HEIGHT: 58.66 IN | OXYGEN SATURATION: 100 % | TEMPERATURE: 98 F

## 2024-12-04 VITALS
SYSTOLIC BLOOD PRESSURE: 129 MMHG | DIASTOLIC BLOOD PRESSURE: 63 MMHG | OXYGEN SATURATION: 100 % | TEMPERATURE: 98 F | RESPIRATION RATE: 18 BRPM | HEART RATE: 74 BPM

## 2024-12-04 VITALS
TEMPERATURE: 98 F | DIASTOLIC BLOOD PRESSURE: 62 MMHG | HEART RATE: 77 BPM | WEIGHT: 149.91 LBS | RESPIRATION RATE: 16 BRPM | SYSTOLIC BLOOD PRESSURE: 109 MMHG | OXYGEN SATURATION: 95 % | HEIGHT: 58.66 IN

## 2024-12-04 LAB
ALBUMIN SERPL ELPH-MCNC: 3.5 G/DL — SIGNIFICANT CHANGE UP (ref 3.3–5.2)
ALP SERPL-CCNC: 101 U/L — SIGNIFICANT CHANGE UP (ref 40–120)
ALT FLD-CCNC: 10 U/L — SIGNIFICANT CHANGE UP
ANION GAP SERPL CALC-SCNC: 10 MMOL/L — SIGNIFICANT CHANGE UP (ref 5–17)
APTT BLD: 25.2 SEC — SIGNIFICANT CHANGE UP (ref 24.5–35.6)
AST SERPL-CCNC: 18 U/L — SIGNIFICANT CHANGE UP
BASOPHILS # BLD AUTO: 0.02 K/UL — SIGNIFICANT CHANGE UP (ref 0–0.2)
BASOPHILS NFR BLD AUTO: 0.1 % — SIGNIFICANT CHANGE UP (ref 0–2)
BILIRUB SERPL-MCNC: <0.2 MG/DL — LOW (ref 0.4–2)
BLD GP AB SCN SERPL QL: SIGNIFICANT CHANGE UP
BUN SERPL-MCNC: 23.8 MG/DL — HIGH (ref 8–20)
CALCIUM SERPL-MCNC: 8.7 MG/DL — SIGNIFICANT CHANGE UP (ref 8.4–10.5)
CHLORIDE SERPL-SCNC: 104 MMOL/L — SIGNIFICANT CHANGE UP (ref 96–108)
CO2 SERPL-SCNC: 27 MMOL/L — SIGNIFICANT CHANGE UP (ref 22–29)
CREAT SERPL-MCNC: 0.52 MG/DL — SIGNIFICANT CHANGE UP (ref 0.5–1.3)
CRP SERPL-MCNC: 17 MG/L — HIGH
EGFR: 92 ML/MIN/1.73M2 — SIGNIFICANT CHANGE UP
EOSINOPHIL # BLD AUTO: 1.3 K/UL — HIGH (ref 0–0.5)
EOSINOPHIL NFR BLD AUTO: 9.6 % — HIGH (ref 0–6)
ERYTHROCYTE [SEDIMENTATION RATE] IN BLOOD: 21 MM/HR — HIGH (ref 0–20)
GLUCOSE SERPL-MCNC: 85 MG/DL — SIGNIFICANT CHANGE UP (ref 70–99)
HCT VFR BLD CALC: 30.4 % — LOW (ref 34.5–45)
HGB BLD-MCNC: 9 G/DL — LOW (ref 11.5–15.5)
IMM GRANULOCYTES NFR BLD AUTO: 0.4 % — SIGNIFICANT CHANGE UP (ref 0–0.9)
INR BLD: 1 RATIO — SIGNIFICANT CHANGE UP (ref 0.85–1.16)
LYMPHOCYTES # BLD AUTO: 30.4 % — SIGNIFICANT CHANGE UP (ref 13–44)
LYMPHOCYTES # BLD AUTO: 4.11 K/UL — HIGH (ref 1–3.3)
MCHC RBC-ENTMCNC: 22.7 PG — LOW (ref 27–34)
MCHC RBC-ENTMCNC: 29.6 G/DL — LOW (ref 32–36)
MCV RBC AUTO: 76.8 FL — LOW (ref 80–100)
MONOCYTES # BLD AUTO: 0.72 K/UL — SIGNIFICANT CHANGE UP (ref 0–0.9)
MONOCYTES NFR BLD AUTO: 5.3 % — SIGNIFICANT CHANGE UP (ref 2–14)
NEUTROPHILS # BLD AUTO: 7.33 K/UL — SIGNIFICANT CHANGE UP (ref 1.8–7.4)
NEUTROPHILS NFR BLD AUTO: 54.2 % — SIGNIFICANT CHANGE UP (ref 43–77)
PLATELET # BLD AUTO: 418 K/UL — HIGH (ref 150–400)
POTASSIUM SERPL-MCNC: 4.4 MMOL/L — SIGNIFICANT CHANGE UP (ref 3.5–5.3)
POTASSIUM SERPL-SCNC: 4.4 MMOL/L — SIGNIFICANT CHANGE UP (ref 3.5–5.3)
PROT SERPL-MCNC: 6.1 G/DL — LOW (ref 6.6–8.7)
PROTHROM AB SERPL-ACNC: 11.3 SEC — SIGNIFICANT CHANGE UP (ref 9.9–13.4)
RBC # BLD: 3.96 M/UL — SIGNIFICANT CHANGE UP (ref 3.8–5.2)
RBC # FLD: 17.6 % — HIGH (ref 10.3–14.5)
SODIUM SERPL-SCNC: 141 MMOL/L — SIGNIFICANT CHANGE UP (ref 135–145)
WBC # BLD: 13.53 K/UL — HIGH (ref 3.8–10.5)
WBC # FLD AUTO: 13.53 K/UL — HIGH (ref 3.8–10.5)

## 2024-12-04 PROCEDURE — 36415 COLL VENOUS BLD VENIPUNCTURE: CPT

## 2024-12-04 PROCEDURE — 99291 CRITICAL CARE FIRST HOUR: CPT | Mod: 25

## 2024-12-04 PROCEDURE — 99292 CRITICAL CARE ADDL 30 MIN: CPT

## 2024-12-04 PROCEDURE — 85025 COMPLETE CBC W/AUTO DIFF WBC: CPT

## 2024-12-04 PROCEDURE — 85652 RBC SED RATE AUTOMATED: CPT

## 2024-12-04 PROCEDURE — 99291 CRITICAL CARE FIRST HOUR: CPT

## 2024-12-04 PROCEDURE — 85730 THROMBOPLASTIN TIME PARTIAL: CPT

## 2024-12-04 PROCEDURE — 86900 BLOOD TYPING SEROLOGIC ABO: CPT

## 2024-12-04 PROCEDURE — 86140 C-REACTIVE PROTEIN: CPT

## 2024-12-04 PROCEDURE — 96374 THER/PROPH/DIAG INJ IV PUSH: CPT

## 2024-12-04 PROCEDURE — 80053 COMPREHEN METABOLIC PANEL: CPT

## 2024-12-04 PROCEDURE — 85610 PROTHROMBIN TIME: CPT

## 2024-12-04 PROCEDURE — 86850 RBC ANTIBODY SCREEN: CPT

## 2024-12-04 PROCEDURE — 86901 BLOOD TYPING SEROLOGIC RH(D): CPT

## 2024-12-04 PROCEDURE — 99285 EMERGENCY DEPT VISIT HI MDM: CPT

## 2024-12-04 RX ORDER — SODIUM CHLORIDE 9 MG/ML
1000 INJECTION, SOLUTION INTRAMUSCULAR; INTRAVENOUS; SUBCUTANEOUS ONCE
Refills: 0 | Status: COMPLETED | OUTPATIENT
Start: 2024-12-04 | End: 2024-12-04

## 2024-12-04 RX ORDER — METHYLPREDNISOLONE SOD SUCC 125 MG
250 VIAL (EA) INJECTION ONCE
Refills: 0 | Status: COMPLETED | OUTPATIENT
Start: 2024-12-04 | End: 2024-12-04

## 2024-12-04 RX ADMIN — SODIUM CHLORIDE 1000 MILLILITER(S): 9 INJECTION, SOLUTION INTRAMUSCULAR; INTRAVENOUS; SUBCUTANEOUS at 17:09

## 2024-12-04 RX ADMIN — Medication 100 MILLIGRAM(S): at 19:08

## 2024-12-04 NOTE — ED ADULT NURSE NOTE - OBJECTIVE STATEMENT
Pt is AOX4 presenting to ED with itchy rash on body after immunotherapy infusion last week. Pt reports she was seen in the ED on Saturday for the rash, was given steroids with no relief. Pt has hx of melanoma and dementia. Denies N/V/D, headaches, lightheadedness, vision changes, SOB. Endorses difficulty swallowing. Airway patent. Pt on CM and . Pt updated on plan of care and verbalizes understanding.

## 2024-12-04 NOTE — ED ADULT TRIAGE NOTE - CHIEF COMPLAINT QUOTE
pt complains of rash to whole body and mouth itching starting on 11/30 s/p immuno therapy. pt transferred for dermatology for r/o  toxic epidermal necrolysis  . pt denies changes in vision, chest pain, headache, nausea, dizziness, vomiting,  SOB, fever, and  chills. pt past medical hx of melanoma on immunotherapy, dementia, and hypothyroidism  .

## 2024-12-04 NOTE — ED PROVIDER NOTE - PATIENT PORTAL LINK FT
You can access the FollowMyHealth Patient Portal offered by [facility] by registering at the following website: [email or web address]. By joining [employer]’s FollowMyHealth portal, you will also be able to view your health information using other applications (apps) compatible with our system.

## 2024-12-04 NOTE — ED ADULT NURSE NOTE - NSFALLUNIVINTERV_ED_ALL_ED
Bed/Stretcher in lowest position, wheels locked, appropriate side rails in place/Call bell, personal items and telephone in reach/Instruct patient to call for assistance before getting out of bed/chair/stretcher/Non-slip footwear applied when patient is off stretcher/[place] to call system/Physically safe environment - no spills, clutter or unnecessary equipment/Purposeful proactive rounding/Room/bathroom lighting operational, light cord in reach

## 2024-12-04 NOTE — ED PROVIDER NOTE - CRITICAL CARE ATTENDING CONTRIBUTION TO CARE
HPI: 83-year-old female past medical history of hypothyroidism, melanoma currently on chemotherapy with most recently administered on November 26, 2024 and seen here for rash as per independent chart review on November 30, 2024 treated with Decadron and Benadryl presents complaining of worsening rash with associated blisters and now for the last 1 day oral involvement including ulcerations.  Patient denies any fever, chills, abdominal pain, chest pain, difficulty breathing.  States symptoms worsening despite OTC therapy.  No other complaints at this time.    ROS:   General: No fever, no chills, no malaise, no fatigue  ENT: No earache, no coryza, no sore throat  Neck: No stiffness, no swollen glands, no dysphagia  Respiratory: No cough, no dyspnea, no pleuritic chest pain  Cardiac: no chest pain, no palpitations, no edema, no jvd  Abdomen: No abdominal pain, no nausea, no vomiting, no diarrhea  : No dysuria, no increase frequency, no urgency, No discharge  Musculoskeletal: No myalgia, no arthralgia  Neurologic: No headache, no vertigo, no paresthesia, no focal deficits, no diplopia  Skin: See HPI  All other ROS are negative    PE:  General: A&O x3   Head: NC/AT  Eyes: PERRL, EOMI  ENT: Airway patent, mmm  Pulmonary: CTA b/l, symmetric breath sounds. No W/R/R.  Cardiac: s1s2, RRR, no M,G,R, No JVD  Abdomen: +BS, ND, NT, soft, no guarding, no rebound, no masses , no rigidity  : No CVA TTP, no suprapubic TTP  Back: Normal  spine  Extremities: FROM, symmetric pulses  Skin: Diffuse body erythematous macules with intermittent ruptured blisters/scabbing most prominent on the abdomen, distal upper extremities, and lower extremity/buttocks.  Ulcerations in the bilateral buccal mucosa  Neurologic: alert, speech clear, no focal deficits    MDM: 83-year-old female past medical history of hypothyroidism, melanoma currently on chemotherapy with most recently administered on November 26, 2024 and seen here for rash as per independent chart review on November 30, 2024 treated with Decadron and Benadryl presents complaining of worsening rash with associated blisters and now for the last 1 day oral involvement including ulcerations.  Patient was evaluated today prior to arrival by an outside dermatologist Dr. 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  Case discussed with Dr. Attending dermatologist Dr. Niño and [facility] attending Dr. Bowen.  Recommending transfer for burn/ICU admission.  Will start IV steroids.  Obtain CBC, CMP, ESR, CRP.  Will provide IV fluids and symptomatic control as needed.  Patient otherwise hemodynamically stable at this time.  Offered patient admission to [place] for burn evaluation, patient requesting [place] at this time. HPI: 83-year-old female past medical history of hypothyroidism, melanoma currently on chemotherapy with most recently administered on November 26, 2024 and seen here for rash as per independent chart review on November 30, 2024 treated with Decadron and Benadryl presents complaining of worsening rash with associated blisters and now for the last 1 day oral involvement including ulcerations.  Patient denies any fever, chills, abdominal pain, chest pain, difficulty breathing.  States symptoms worsening despite OTC therapy.  No other complaints at this time.    ROS:   General: No fever, no chills, no malaise, no fatigue  ENT: No earache, no coryza, no sore throat  Neck: No stiffness, no swollen glands, no dysphagia  Respiratory: No cough, no dyspnea, no pleuritic chest pain  Cardiac: no chest pain, no palpitations, no edema, no jvd  Abdomen: No abdominal pain, no nausea, no vomiting, no diarrhea  : No dysuria, no increase frequency, no urgency, No discharge  Musculoskeletal: No myalgia, no arthralgia  Neurologic: No headache, no vertigo, no paresthesia, no focal deficits, no diplopia  Skin: See HPI  All other ROS are negative    PE:  General: A&O x3   Head: NC/AT  Eyes: PERRL, EOMI  ENT: Airway patent, mmm  Pulmonary: CTA b/l, symmetric breath sounds. No W/R/R.  Cardiac: s1s2, RRR, no M,G,R, No JVD  Abdomen: +BS, ND, NT, soft, no guarding, no rebound, no masses , no rigidity  : No CVA TTP, no suprapubic TTP  Back: Normal  spine  Extremities: FROM, symmetric pulses  Skin: Diffuse body erythematous macules with intermittent ruptured blisters/scabbing most prominent on the abdomen, distal upper extremities, and lower extremity/buttocks with extension into the rectum.  Ulcerations in the bilateral buccal mucosa.  Neurologic: alert, speech clear, no focal deficits    MDM: 83-year-old female past medical history of hypothyroidism, melanoma currently on chemotherapy with most recently administered on November 26, 2024 and seen here for rash as per independent chart review on November 30, 2024 treated with Decadron and Benadryl presents complaining of worsening rash with associated blisters and now for the last 1 day oral involvement including ulcerations.  Patient was evaluated today prior to arrival by an outside dermatologist Dr. 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  Case discussed with Dr. Attending dermatologist Dr. [facility].  Recommending transfer for burn/ICU admission.  Will start IV steroids.  Obtain CBC, CMP, ESR, CRP.  Will provide IV fluids and symptomatic control as needed.  Patient otherwise hemodynamically stable at this time.  Patient accepted for transfer to [facility] for dermatology evaluation as per discussion with attending dermatologist Dr. Stephens.

## 2024-12-04 NOTE — ED ADULT NURSE NOTE - NSFALLUNIVINTERV_ED_ALL_ED
Bed/Stretcher in lowest position, wheels locked, appropriate side rails in place/Call bell, personal items and telephone in reach/Instruct patient to call for assistance before getting out of bed/chair/stretcher/Non-slip footwear applied when patient is off stretcher/[place] to call system/Physically safe environment - no spills, clutter or unnecessary equipment/Purposeful proactive rounding/Room/bathroom lighting operational, light cord in reach

## 2024-12-04 NOTE — ED PROVIDER NOTE - NSFOLLOWUPINSTRUCTIONS_ED_ALL_ED_FT
You were seen and evaluated the emergency department today for your symptoms.  You had Dermabond and Surgicel placed to help control your bleeding.  Please leave the bandage intact for the next 48 hours.  Return to the emergency department if any excessive bleeding continues to occur.  Please follow-up with the physician that performed the initial procedure within 1 day to discuss your visit here today.    Please return to the emergency department for any new or concerning symptoms including but not limited to fever, chills, chest pain, difficulty breathing, excessive bleeding.    TREATMENT    A laceration is a cut or lesion that goes through all layers of the skin and into the tissue just beneath the skin. Some lacerations may not require closure. Some lacerations may not be able to be closed due to an increased risk of infection. It is important to see your caregiver as soon as possible after an injury to minimize the risk of infection and maximize the opportunity for successful closure. The wound will be cleaned to help prevent infection. Your caregiver will use stitches (sutures), staples, wound glue (adhesive), or skin adhesive strips to repair the laceration. These tools bring the skin edges together to allow for faster healing and a better cosmetic outcome. However, all wounds will heal with a scar. Once the wound has healed, scarring can be minimized by covering the wound with sunscreen during the day for 1 full year.    HOME CARE INSTRUCTIONS    For WOUND ADHESIVE: You may briefly wet your wound in the shower or bath. Do not soak or scrub the wound. Do not swim. Avoid periods of heavy perspiration until the skin adhesive has fallen off on its own. After showering or bathing, gently pat the wound dry with a clean towel. Do not apply liquid medicine, cream medicine, or ointment medicine to your wound while the skin adhesive is in place. This may loosen the film before your wound is healed. If a dressing is placed over the wound, be careful not to apply tape directly over the skin adhesive. This may cause the adhesive to be pulled off before the wound is healed. Avoid prolonged exposure to sunlight or tanning lamps while the skin adhesive is in place. Exposure to ultraviolet light in the first year will darken the scar. The skin adhesive will usually remain in place for 5 to 10 days, then naturally fall off the skin. Do not pick at the adhesive film.    SEEK MEDICAL CARE IF: You have redness, swelling, or increasing pain in the wound. You see a red line that goes away from the wound. You have yellowish-white fluid (pus) coming from the wound. You have a fever. You notice a bad smell coming from the wound or dressing. Your wound breaks open before or after sutures have been removed. You notice something coming out of the wound such as wood or glass. Your wound is on your hand or foot and you cannot move a finger or toe.    SEEK IMMEDIATE MEDICAL CARE IF: Your pain is not controlled with prescribed medicine. You have severe swelling around the wound causing pain and numbness or a change in color in your arm, hand, leg, or foot. Your wound splits open and starts bleeding. You have worsening numbness, weakness, or loss of function of any joint around or beyond the wound. You develop painful lumps near the wound or on the skin anywhere on your body. MAKE SURE YOU: Understand these instructions. Will watch your condition. Will get help right away if you are not doing well or get worse.

## 2024-12-04 NOTE — ED PROVIDER NOTE - CLINICAL SUMMARY MEDICAL DECISION MAKING FREE TEXT BOX
HPI: 76-year-old male presents with bleeding from the left thigh after basal cell carcinoma was excised on December 2.  Patient seen here the same day for laceration repair with additional stitches placed.  Patient endorses persistent bleeding for the last 2 days over the incision site of the left anterior thigh.  Denies any chest pain, dizziness, weakness, fever, fatigue, difficulty breathing.  No other complaints this time.    ROS:   General: No fever, no chills, no malaise, no fatigue  Respiratory: No cough, no dyspnea, no pleuritic chest pain  Cardiac: no chest pain, no palpitations, no edema, no jvd  Abdomen: No abdominal pain, no nausea, no vomiting, no diarrhea  : No dysuria, no increase frequency, no urgency, No discharge  Musculoskeletal: No myalgia, no arthralgia  Neurologic: No headache, no vertigo, no paresthesia, no focal deficits  Skin: See HPI  All other ROS are negative    PE:  General: NAD; well appearing; A&O x3   Head: NC/AT  Eyes: PERRL, EOMI  ENT: Airway patent, mmm  Extremities: FROM, symmetric pulses, capillary refill < 2 seconds, no edema, 5/5 strength in b/l UE and LE  Skin: Oozing from the left anterior thigh with remaining sutures CDI.  Neurologic: alert, speech clear, no focal deficits  Psych: nl mood/affect, nl insight.    MDM: 76-year-old male presents with bleeding from the left thigh after basal cell carcinoma was excised on December 2.  Oozing from the left anterior thigh with remaining sutures CDI.  Local wound care applied with cessation of bleeding.  Discussed importance of outpatient follow-up with physician that performed procedure.  Strict return precautions given. Dr. Garcia: See attending attestation.

## 2024-12-04 NOTE — ED ADULT TRIAGE NOTE - CHIEF COMPLAINT QUOTE
Pt is here for an allergic reaction (rash) to an infusion she had last week, pt came into the Emergency Department for the allergic reaction on Saturday and was given steroids and discharged home. Pt still has the rash, states it is getting worse and her oncologist Dr. Phipps told her to come to the Emergency Department for evaluation. Pt has sores in her mouth and difficulty swallowing, pt reports some difficulty breathing.

## 2024-12-04 NOTE — ED PROVIDER NOTE - CARE PLAN
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

## 2024-12-04 NOTE — ED ADULT NURSE NOTE - OBJECTIVE STATEMENT
Pt received to rm 29 ,, A&OX4, ambulatory. Hx of melanoma on immunotherapy, dementia, and hypothyroidism C/o.  of rash to whole body and mouth itching starting on 11/30. s/p immuno therapy. Pt was transferred for dermatology for r/o  toxic epidermal necrolysis. Pt states her skin is very itchy. Notable rasied patches to arms and neck. Pt has sore to R cheek.    Respirations even and unlabored. Abdomen is soft, nontender, nondistended. Normoactive bowel sounds. Pulses equal in all extremities b/l. Cap refill < 3 sec. PERRLA. Denies CP, SOB, N/V, HA, dizziness, palpitations, blurry vision. 20G IV placed to R forearm.    . Bed in lowest position, call bell within reach. Safety maintained. Pt received to rm 29 ,, A&OX3, ambulatory. Hx of melanoma on immunotherapy, dementia, and hypothyroidism C/o.  of rash to whole body and mouth itching starting on 11/30. s/p immuno therapy. Pt was transferred for dermatology for r/o  toxic epidermal necrolysis. Pt states her skin is very itchy. Notable rasied patches to arms and neck. Pt has sore to R cheek.    Respirations even and unlabored. Abdomen is soft, nontender, nondistended. Normoactive bowel sounds. Pulses equal in all extremities b/l. Cap refill < 3 sec. PERRLA. Denies CP, SOB, N/V, HA, dizziness, palpitations, blurry vision. 20G IV placed to R forearm.    . Bed in lowest position, call bell within reach. Safety maintained.

## 2024-12-05 ENCOUNTER — APPOINTMENT (OUTPATIENT)
Dept: HEMATOLOGY ONCOLOGY | Facility: CLINIC | Age: 84
End: 2024-12-05

## 2024-12-05 ENCOUNTER — RESULT REVIEW (OUTPATIENT)
Age: 84
End: 2024-12-05

## 2024-12-05 DIAGNOSIS — R21 RASH AND OTHER NONSPECIFIC SKIN ERUPTION: ICD-10-CM

## 2024-12-05 DIAGNOSIS — E03.9 HYPOTHYROIDISM, UNSPECIFIED: ICD-10-CM

## 2024-12-05 DIAGNOSIS — Z29.9 ENCOUNTER FOR PROPHYLACTIC MEASURES, UNSPECIFIED: ICD-10-CM

## 2024-12-05 LAB
HSV+VZV DNA SPEC QL NAA+PROBE: SIGNIFICANT CHANGE UP
HSV+VZV DNA SPEC QL NAA+PROBE: SIGNIFICANT CHANGE UP
SPECIMEN SOURCE: SIGNIFICANT CHANGE UP
SPECIMEN SOURCE: SIGNIFICANT CHANGE UP

## 2024-12-05 PROCEDURE — 88350 IMFLUOR EA ADDL 1ANTB STN PX: CPT | Mod: 26

## 2024-12-05 PROCEDURE — 99223 1ST HOSP IP/OBS HIGH 75: CPT

## 2024-12-05 PROCEDURE — 88346 IMFLUOR 1ST 1ANTB STAIN PX: CPT | Mod: 26

## 2024-12-05 PROCEDURE — 88305 TISSUE EXAM BY PATHOLOGIST: CPT | Mod: 26

## 2024-12-05 PROCEDURE — 11105 PUNCH BX SKIN EA SEP/ADDL: CPT

## 2024-12-05 PROCEDURE — 11104 PUNCH BX SKIN SINGLE LESION: CPT

## 2024-12-05 PROCEDURE — 99223 1ST HOSP IP/OBS HIGH 75: CPT | Mod: 25

## 2024-12-05 RX ORDER — LEVOTHYROXINE SODIUM 150 MCG
50 TABLET ORAL DAILY
Refills: 0 | Status: DISCONTINUED | OUTPATIENT
Start: 2024-12-05 | End: 2024-12-10

## 2024-12-05 RX ORDER — SERTRALINE HYDROCHLORIDE 100 MG/1
50 TABLET, FILM COATED ORAL DAILY
Refills: 0 | Status: DISCONTINUED | OUTPATIENT
Start: 2024-12-05 | End: 2024-12-10

## 2024-12-05 RX ORDER — LEVOTHYROXINE SODIUM 150 MCG
50 TABLET ORAL
Refills: 0 | DISCHARGE

## 2024-12-05 RX ORDER — ACETAMINOPHEN, DIPHENHYDRAMINE HCL, PHENYLEPHRINE HCL 325; 25; 5 MG/1; MG/1; MG/1
3 TABLET ORAL AT BEDTIME
Refills: 0 | Status: DISCONTINUED | OUTPATIENT
Start: 2024-12-05 | End: 2024-12-10

## 2024-12-05 RX ORDER — AMLODIPINE BESYLATE 10 MG/1
5 TABLET ORAL DAILY
Refills: 0 | Status: DISCONTINUED | OUTPATIENT
Start: 2024-12-05 | End: 2024-12-10

## 2024-12-05 RX ORDER — ONDANSETRON HYDROCHLORIDE 4 MG/1
4 TABLET, FILM COATED ORAL EVERY 8 HOURS
Refills: 0 | Status: DISCONTINUED | OUTPATIENT
Start: 2024-12-05 | End: 2024-12-10

## 2024-12-05 RX ORDER — AMLODIPINE BESYLATE 10 MG/1
1 TABLET ORAL
Refills: 0 | DISCHARGE

## 2024-12-05 RX ORDER — AMMONIUM LACTATE 120 MG/G
1 LOTION TOPICAL
Refills: 0 | Status: DISCONTINUED | OUTPATIENT
Start: 2024-12-05 | End: 2024-12-10

## 2024-12-05 RX ORDER — PRAVASTATIN SODIUM 20 MG/1
1 TABLET ORAL
Refills: 0 | DISCHARGE

## 2024-12-05 RX ORDER — SERTRALINE HYDROCHLORIDE 100 MG/1
50 TABLET, FILM COATED ORAL
Refills: 0 | DISCHARGE

## 2024-12-05 RX ORDER — PREDNISONE 20 MG/1
10 TABLET ORAL DAILY
Refills: 0 | Status: DISCONTINUED | OUTPATIENT
Start: 2024-12-05 | End: 2024-12-10

## 2024-12-05 RX ORDER — MEMANTINE HYDROCHLORIDE 14 MG/1
1 CAPSULE, EXTENDED RELEASE ORAL
Refills: 0 | DISCHARGE

## 2024-12-05 RX ORDER — TRIAMCINOLONE ACETONIDE 0.15 MG/G
1 AEROSOL, SPRAY TOPICAL
Refills: 0 | Status: DISCONTINUED | OUTPATIENT
Start: 2024-12-05 | End: 2024-12-10

## 2024-12-05 RX ORDER — MAGNESIUM, ALUMINUM HYDROXIDE 200-225/5
30 SUSPENSION, ORAL (FINAL DOSE FORM) ORAL EVERY 4 HOURS
Refills: 0 | Status: DISCONTINUED | OUTPATIENT
Start: 2024-12-05 | End: 2024-12-10

## 2024-12-05 RX ORDER — ACETAMINOPHEN 500MG 500 MG/1
650 TABLET, COATED ORAL EVERY 6 HOURS
Refills: 0 | Status: DISCONTINUED | OUTPATIENT
Start: 2024-12-05 | End: 2024-12-10

## 2024-12-05 RX ORDER — DEXAMETHASONE 1.5 MG/1
0.5 TABLET ORAL
Refills: 0 | Status: DISCONTINUED | OUTPATIENT
Start: 2024-12-05 | End: 2024-12-09

## 2024-12-05 RX ORDER — CALAMINE
1 LOTION (ML) TOPICAL ONCE
Refills: 0 | Status: COMPLETED | OUTPATIENT
Start: 2024-12-05 | End: 2024-12-05

## 2024-12-05 RX ORDER — MEMANTINE HYDROCHLORIDE 14 MG/1
10 CAPSULE, EXTENDED RELEASE ORAL
Refills: 0 | Status: DISCONTINUED | OUTPATIENT
Start: 2024-12-05 | End: 2024-12-10

## 2024-12-05 RX ORDER — DIPHENHYDRAMINE HYDROCHLORIDE AND LIDOCAINE HYDROCHLORIDE AND ALUMINUM HYDROXIDE AND MAGNESIUM HYDRO
10 KIT
Refills: 0 | Status: DISCONTINUED | OUTPATIENT
Start: 2024-12-05 | End: 2024-12-10

## 2024-12-05 RX ORDER — TRIAMCINOLONE ACETONIDE 0.15 MG/G
1 AEROSOL, SPRAY TOPICAL
Refills: 0 | Status: DISCONTINUED | OUTPATIENT
Start: 2024-12-05 | End: 2024-12-05

## 2024-12-05 RX ADMIN — PREDNISONE 10 MILLIGRAM(S): 20 TABLET ORAL at 16:07

## 2024-12-05 RX ADMIN — SERTRALINE HYDROCHLORIDE 50 MILLIGRAM(S): 100 TABLET, FILM COATED ORAL at 13:27

## 2024-12-05 RX ADMIN — Medication 10 MILLIGRAM(S): at 22:14

## 2024-12-05 RX ADMIN — TRIAMCINOLONE ACETONIDE 1 APPLICATION(S): 0.15 AEROSOL, SPRAY TOPICAL at 05:28

## 2024-12-05 RX ADMIN — DIPHENHYDRAMINE HYDROCHLORIDE AND LIDOCAINE HYDROCHLORIDE AND ALUMINUM HYDROXIDE AND MAGNESIUM HYDRO 10 MILLILITER(S): KIT at 13:24

## 2024-12-05 RX ADMIN — ACETAMINOPHEN 500MG 650 MILLIGRAM(S): 500 TABLET, COATED ORAL at 23:14

## 2024-12-05 RX ADMIN — Medication 1 APPLICATION(S): at 03:28

## 2024-12-05 RX ADMIN — Medication 1 APPLICATION(S): at 17:27

## 2024-12-05 RX ADMIN — ACETAMINOPHEN, DIPHENHYDRAMINE HCL, PHENYLEPHRINE HCL 3 MILLIGRAM(S): 325; 25; 5 TABLET ORAL at 22:14

## 2024-12-05 RX ADMIN — AMMONIUM LACTATE 1 APPLICATION(S): 120 LOTION TOPICAL at 18:38

## 2024-12-05 RX ADMIN — DEXAMETHASONE 0.5 MILLIGRAM(S): 1.5 TABLET ORAL at 17:25

## 2024-12-05 RX ADMIN — TRIAMCINOLONE ACETONIDE 1 APPLICATION(S): 0.15 AEROSOL, SPRAY TOPICAL at 17:27

## 2024-12-05 RX ADMIN — DIPHENHYDRAMINE HYDROCHLORIDE AND LIDOCAINE HYDROCHLORIDE AND ALUMINUM HYDROXIDE AND MAGNESIUM HYDRO 10 MILLILITER(S): KIT at 17:27

## 2024-12-05 RX ADMIN — DIPHENHYDRAMINE HYDROCHLORIDE AND LIDOCAINE HYDROCHLORIDE AND ALUMINUM HYDROXIDE AND MAGNESIUM HYDRO 10 MILLILITER(S): KIT at 22:14

## 2024-12-05 RX ADMIN — MEMANTINE HYDROCHLORIDE 10 MILLIGRAM(S): 14 CAPSULE, EXTENDED RELEASE ORAL at 17:27

## 2024-12-05 RX ADMIN — AMMONIUM LACTATE 1 APPLICATION(S): 120 LOTION TOPICAL at 11:47

## 2024-12-05 RX ADMIN — Medication 1 APPLICATION(S): at 22:14

## 2024-12-05 RX ADMIN — ACETAMINOPHEN 500MG 650 MILLIGRAM(S): 500 TABLET, COATED ORAL at 22:14

## 2024-12-05 NOTE — CONSULT NOTE ADULT - SUBJECTIVE AND OBJECTIVE BOX
HPI:  82 yo F PMHx hypothyroid, dementia, melanoma immunotherapy with nivolumab and Ipilimumab administered on November 26, 2024 subsequently sen at [place] for rash on 11/30 (treated for presumed allergic reaction with steroid and benadryl) p/w worsening of rash with blisters with oral involvement to [facility] and transferred to [facility] for further eval. Pt was seen by outside dermatologist Dr. Husain ([phone]) 1 day prior to arrival and had a Bx at that time and was told she had TEN. [place] given  s/p solumedrol 250mg. In ED pt was remotely seen by dermatology.  (05 Dec 2024 09:11)    hx per caretaker and brother (POA and healthcare proxy)  Nivo/ipi was started last tuesday on 11/26/24 on 11/28, saw her internist gave her topical ointments and sent her to derm, [place] [facility], gave an ointment; not sure what it was. Went to [facility] ED 11/30/24 and was dispo; Spoke to onc and then onc wanted pt to be seen by an MD. Got 20 mg of pred. Then saw Dr. Husain 12/4 who felt was TEN and sent her to ED.     No other supplements, she is given everything to take by caretaker   caretaker is not itchy    PAST MEDICAL & SURGICAL HISTORY:  Hypothyroid      Dementia      Melanoma      Hypertension      No significant past surgical history          REVIEW OF SYSTEMS:  General: no fevers/chills; no lethargy  Skin/Breast: see HPI  Ophthalmologic: no eye pain or changes in vision  ENT: no dysphagia or changes in hearing  Respiratory: no SOB or cough  Cardiovascular: no palpitations or chest pain  Gastrointestinal: no abdominal pain or blood in stool  Genitourinary: no dysuria or frequency  Musculoskeletal: no joint pains or weakness  Neurological: no weakness, numbness, or tingling    MEDICATIONS  (STANDING):  amLODIPine   Tablet 5 milliGRAM(s) Oral daily  ammonium lactate 12% Lotion 1 Application(s) Topical two times a day  atorvastatin 10 milliGRAM(s) Oral at bedtime  dexAMETHasone    Solution 0.5 milliGRAM(s) Oral two times a day  FIRST- Mouthwash  BLM 10 milliLiter(s) Swish and Spit four times a day  levothyroxine 50 MICROGram(s) Oral daily  memantine 10 milliGRAM(s) Oral two times a day  mupirocin 2% Ointment 1 Application(s) Topical two times a day  sertraline 50 milliGRAM(s) Oral daily  triamcinolone 0.1% Ointment 1 Application(s) Topical two times a day    MEDICATIONS  (PRN):  acetaminophen     Tablet .. 650 milliGRAM(s) Oral every 6 hours PRN Temp greater or equal to 38C (100.4F), Mild Pain (1 - 3)  aluminum hydroxide/magnesium hydroxide/simethicone Suspension 30 milliLiter(s) Oral every 4 hours PRN Dyspepsia  melatonin 3 milliGRAM(s) Oral at bedtime PRN Insomnia  ondansetron Injectable 4 milliGRAM(s) IV Push every 8 hours PRN Nausea and/or Vomiting      Allergies    Omnipaque 350 (Anaphylaxis)  penicillin (Unknown)    Intolerances        SOCIAL HISTORY:     hx smoking  non-smoker    FAMILY HISTORY:    noncontributory    Vital Signs Last 24 Hrs  T(C): 36.7 (05 Dec 2024 13:18), Max: 36.8 (04 Dec 2024 22:31)  T(F): 98.1 (05 Dec 2024 13:18), Max: 98.3 (04 Dec 2024 22:31)  HR: 73 (05 Dec 2024 13:18) (69 - 79)  BP: 148/71 (05 Dec 2024 13:18) (109/62 - 148/71)  BP(mean): --  RR: 16 (05 Dec 2024 13:18) (16 - 18)  SpO2: 100% (05 Dec 2024 13:18) (95% - 100%)    Parameters below as of 05 Dec 2024 13:18  Patient On (Oxygen Delivery Method): room air        PHYSICAL EXAM:  The patient was in NAD.  OP showed no ulcerations.  There was no visible LAD.  Conjunctiva were non-injected.  There was no clubbing or edema of extremities.   The scalp, hair, face, eyebrows, lips, OP, neck, chest, back, extremities x4, and nails were examined.  There was no hyperhidrosis or bromhidrosis.     Of note on skin exam: erythematous edematous pink scaly plaques and erosions diffusely on trunk and extremities with tense bullae on palms and ventral wrist  erosion in L cheek    LABS:                        9.0    13.53 )-----------( 418      ( 04 Dec 2024 17:11 )             30.4     12-04    141  |  104  |  23.8[H]  ----------------------------<  85  4.4   |  27.0  |  0.52    Ca    8.7      04 Dec 2024 17:11    TPro  6.1[L]  /  Alb  3.5  /  TBili  <0.2[L]  /  DBili  x   /  AST  18  /  ALT  10  /  AlkPhos  101  12-04    PT/INR - ( 04 Dec 2024 17:11 )   PT: 11.3 sec;   INR: 1.00 ratio         PTT - ( 04 Dec 2024 17:11 )  PTT:25.2 sec  Urinalysis Basic - ( 04 Dec 2024 17:11 )    Color: x / Appearance: x / SG: x / pH: x  Gluc: 85 mg/dL / Ketone: x  / Bili: x / Urobili: x   Blood: x / Protein: x / Nitrite: x   Leuk Esterase: x / RBC: x / WBC x   Sq Epi: x / Non Sq Epi: x / Bacteria: x        RADIOLOGY & ADDITIONAL STUDIES: reviewed; no pertinent findings HPI:  84 yo F PMHx hypothyroid, dementia, melanoma immunotherapy with nivolumab and Ipilimumab administered on November 26, 2024 subsequently sen at [place] for rash on 11/30 (treated for presumed allergic reaction with steroid and benadryl) p/w worsening of rash with blisters with oral involvement to [facility] and transferred to [facility] for further eval. Pt was seen by outside dermatologist Dr. Husain ([phone]) 1 day prior to arrival and had a Bx at that time and was told she had TEN. [place] given  s/p solumedrol 250mg. In ED pt was remotely seen by dermatology.  (05 Dec 2024 09:11)    hx per caretaker and brother (POA and healthcare proxy)  Nivo/ipi was started last tuesday on 11/26/24 on 11/28, saw her internist gave her topical ointments and sent her to derm, [place] [facility], gave an ointment; not sure what it was. Went to [facility] ED 11/30/24 and was dispo; Spoke to onc and then onc wanted pt to be seen by an MD. Got 20 mg of pred. Then saw Dr. Husain 12/4 who felt was TEN and sent her to ED.     No other supplements, she is given everything to take by caretaker   caretaker is not itchy    PAST MEDICAL & SURGICAL HISTORY:  Hypothyroid      Dementia      Melanoma      Hypertension      No significant past surgical history          REVIEW OF SYSTEMS:  General: no fevers/chills; no lethargy  Skin/Breast: see HPI  Ophthalmologic: no eye pain or changes in vision  ENT: no dysphagia or changes in hearing  Respiratory: no SOB or cough  Cardiovascular: no palpitations or chest pain  Gastrointestinal: no abdominal pain or blood in stool  Genitourinary: no dysuria or frequency  Musculoskeletal: no joint pains or weakness  Neurological: no weakness, numbness, or tingling    MEDICATIONS  (STANDING):  amLODIPine   Tablet 5 milliGRAM(s) Oral daily  ammonium lactate 12% Lotion 1 Application(s) Topical two times a day  atorvastatin 10 milliGRAM(s) Oral at bedtime  dexAMETHasone    Solution 0.5 milliGRAM(s) Oral two times a day  FIRST- Mouthwash  BLM 10 milliLiter(s) Swish and Spit four times a day  levothyroxine 50 MICROGram(s) Oral daily  memantine 10 milliGRAM(s) Oral two times a day  mupirocin 2% Ointment 1 Application(s) Topical two times a day  sertraline 50 milliGRAM(s) Oral daily  triamcinolone 0.1% Ointment 1 Application(s) Topical two times a day    MEDICATIONS  (PRN):  acetaminophen     Tablet .. 650 milliGRAM(s) Oral every 6 hours PRN Temp greater or equal to 38C (100.4F), Mild Pain (1 - 3)  aluminum hydroxide/magnesium hydroxide/simethicone Suspension 30 milliLiter(s) Oral every 4 hours PRN Dyspepsia  melatonin 3 milliGRAM(s) Oral at bedtime PRN Insomnia  ondansetron Injectable 4 milliGRAM(s) IV Push every 8 hours PRN Nausea and/or Vomiting      Allergies    Omnipaque 350 (Anaphylaxis)  penicillin (Unknown)    Intolerances        SOCIAL HISTORY:     hx smoking  non-smoker    FAMILY HISTORY:    noncontributory    Vital Signs Last 24 Hrs  T(C): 36.7 (05 Dec 2024 13:18), Max: 36.8 (04 Dec 2024 22:31)  T(F): 98.1 (05 Dec 2024 13:18), Max: 98.3 (04 Dec 2024 22:31)  HR: 73 (05 Dec 2024 13:18) (69 - 79)  BP: 148/71 (05 Dec 2024 13:18) (109/62 - 148/71)  BP(mean): --  RR: 16 (05 Dec 2024 13:18) (16 - 18)  SpO2: 100% (05 Dec 2024 13:18) (95% - 100%)    Parameters below as of 05 Dec 2024 13:18  Patient On (Oxygen Delivery Method): room air        PHYSICAL EXAM:  The patient was in NAD.  OP showed no ulcerations.  There was no visible LAD.  Conjunctiva were non-injected.  There was no clubbing or edema of extremities.   The scalp, hair, face, eyebrows, lips, OP, neck, chest, back, extremities x4, and nails were examined.  There was no hyperhidrosis or bromhidrosis.     Of note on skin exam: erythematous edematous pink scaly plaques and erosions diffusely on trunk and extremities with tense bullae on palms and ventral wrist  erosion in L cheek    LABS:                        9.0    13.53 )-----------( 418      ( 04 Dec 2024 17:11 )             30.4     12-04    141  |  104  |  23.8[H]  ----------------------------<  85  4.4   |  27.0  |  0.52    Ca    8.7      04 Dec 2024 17:11    TPro  6.1[L]  /  Alb  3.5  /  TBili  <0.2[L]  /  DBili  x   /  AST  18  /  ALT  10  /  AlkPhos  101  12-04    PT/INR - ( 04 Dec 2024 17:11 )   PT: 11.3 sec;   INR: 1.00 ratio         PTT - ( 04 Dec 2024 17:11 )  PTT:25.2 sec  Urinalysis Basic - ( 04 Dec 2024 17:11 )    Color: x / Appearance: x / SG: x / pH: x  Gluc: 85 mg/dL / Ketone: x  / Bili: x / Urobili: x   Blood: x / Protein: x / Nitrite: x   Leuk Esterase: x / RBC: x / WBC x   Sq Epi: x / Non Sq Epi: x / Bacteria: x        RADIOLOGY & ADDITIONAL STUDIES: reviewed; no pertinent findings    Meds:      These are all meds that have been taking for years   Amlodipine    Levothyroxine   Vitamin b12   Pravastatin   Sertraline   Amantadine

## 2024-12-05 NOTE — H&P ADULT - HISTORY OF PRESENT ILLNESS
82 yo F PMHx hypothyroid, dementia, melanoma immunotherapy with nivolumab and Ipilimumab administered on November 26, 2024 subsequently sen at [place] for rash on 11/30 (treated for presumed allergic reaction with steroid and benadryl) p/w worsening of rash with blisters with oral involvement to [facility] and transferred to [facility] for further eval. Pt was seen by outside dermatologist Dr. Husain ([phone]) 1 day prior to arrival and had a Bx at that time and was told she had TEN. [place] given  s/p solumedrol 250mg. In ED pt was remotely seen by dermatology.

## 2024-12-05 NOTE — H&P ADULT - NSHPREVIEWOFSYSTEMS_GEN_ALL_CORE
Review of Systems:   CONSTITUTIONAL: No fever, weight loss, or fatigue  EYES: No eye pain, visual disturbances, or discharge  ENMT:  No difficulty hearing, tinnitus, vertigo; No sinus or throat pain  NECK: No pain or stiffness  BREASTS: No pain, masses, or nipple discharge  RESPIRATORY: No cough, wheezing, chills or hemoptysis; No shortness of breath  CARDIOVASCULAR: No chest pain, palpitations, dizziness, or leg swelling  GASTROINTESTINAL: No abdominal or epigastric pain. No nausea, vomiting, or hematemesis; No diarrhea or constipation. No melena or hematochezia.  GENITOURINARY: No dysuria, frequency, hematuria, or incontinence  NEUROLOGICAL: No headaches, memory loss, loss of strength, numbness, or tremors  SKIN: No itching, burning or lesions + rash itchy   LYMPH NODES: No enlarged glands  ENDOCRINE: No heat or cold intolerance; No hair loss  MUSCULOSKELETAL: No joint pain or swelling; No muscle, back, or extremity pain  PSYCHIATRIC: No depression, anxiety, mood swings, or difficulty sleeping  HEME/LYMPH: No easy bruising, or bleeding gums  ALLERGY AND IMMUNOLOGIC: No hives or eczema

## 2024-12-05 NOTE — H&P ADULT - ASSESSMENT
82 yo F w/ hx dementia, melanoma on nivolumab and Ipilimumab p/w rash and concern for adverse drug reaction

## 2024-12-05 NOTE — H&P ADULT - NSHPSOCIALHISTORY_GEN_ALL_CORE
Lives in independent living facility ; Former smoker, quit over 20 years ago, smoked about 1ppd   social alcohol use.

## 2024-12-05 NOTE — H&P ADULT - NSHPLABSRESULTS_GEN_ALL_CORE
9.0    13.53 )-----------( 418      ( 04 Dec 2024 17:11 )             30.4       12-04    141  |  104  |  23.8[H]  ----------------------------<  85  4.4   |  27.0  |  0.52    Ca    8.7      04 Dec 2024 17:11    TPro  6.1[L]  /  Alb  3.5  /  TBili  <0.2[L]  /  DBili  x   /  AST  18  /  ALT  10  /  AlkPhos  101  12-04      CAPILLARY BLOOD GLUCOSE          Urinalysis Basic - ( 04 Dec 2024 17:11 )    Color: x / Appearance: x / SG: x / pH: x  Gluc: 85 mg/dL / Ketone: x  / Bili: x / Urobili: x   Blood: x / Protein: x / Nitrite: x   Leuk Esterase: x / RBC: x / WBC x   Sq Epi: x / Non Sq Epi: x / Bacteria: x        PT/INR - ( 04 Dec 2024 17:11 )   PT: 11.3 sec;   INR: 1.00 ratio         PTT - ( 04 Dec 2024 17:11 )  PTT:25.2 sec    Radiology

## 2024-12-05 NOTE — H&P ADULT - NSHPPHYSICALEXAM_GEN_ALL_CORE
Vital Signs Last 24 Hrs  T(C): 36.2 (05 Dec 2024 09:11), Max: 36.8 (04 Dec 2024 22:31)  T(F): 97.2 (05 Dec 2024 09:11), Max: 98.3 (04 Dec 2024 22:31)  HR: 74 (05 Dec 2024 09:11) (69 - 79)  BP: 126/66 (05 Dec 2024 09:11) (109/62 - 145/63)  BP(mean): --  RR: 16 (05 Dec 2024 09:11) (16 - 18)  SpO2: 100% (05 Dec 2024 09:11) (95% - 100%)    Parameters below as of 05 Dec 2024 09:11  Patient On (Oxygen Delivery Method): room air        PHYSICAL EXAM:  GENERAL: NAD, well-developed  HEAD:  Atraumatic, Normocephalic  EYES: EOMI, PERRLA, conjunctiva and sclera clear  NECK: Supple, No JVD  CHEST/LUNG: Clear to auscultation bilaterally; No wheeze  HEART: Regular rate and rhythm; No murmurs, rubs, or gallops  ABDOMEN: Soft, Nontender, Nondistended; Bowel sounds present  EXTREMITIES:  2+ Peripheral Pulses, No clubbing, cyanosis, or edema  PSYCH: AAOx3  NEUROLOGY: non-focal  SKIN:  Diffuse body erythematous macules with intermittent ruptured blisters/scabbing most prominent on the abdomen, distal upper extremities, and lower extremity/buttocks   Ulcerations in the bilateral buccal mucosa. R NARE with hardened black lesion with necrotic appearance Vital Signs Last 24 Hrs  T(C): 36.2 (05 Dec 2024 09:11), Max: 36.8 (04 Dec 2024 22:31)  T(F): 97.2 (05 Dec 2024 09:11), Max: 98.3 (04 Dec 2024 22:31)  HR: 74 (05 Dec 2024 09:11) (69 - 79)  BP: 126/66 (05 Dec 2024 09:11) (109/62 - 145/63)  BP(mean): --  RR: 16 (05 Dec 2024 09:11) (16 - 18)  SpO2: 100% (05 Dec 2024 09:11) (95% - 100%)    Parameters below as of 05 Dec 2024 09:11  Patient On (Oxygen Delivery Method): room air        PHYSICAL EXAM:  GENERAL: NAD, well-developed  HEAD:  Atraumatic, Normocephalic  EYES: EOMI, PERRLA, conjunctiva and sclera clear  NECK: Supple, No JVD  CHEST/LUNG: Clear to auscultation bilaterally; No wheeze  HEART: Regular rate and rhythm; No murmurs, rubs, or gallops  ABDOMEN: Soft, Nontender, Nondistended; Bowel sounds present  EXTREMITIES:  2+ Peripheral Pulses, No clubbing, cyanosis, or edema  PSYCH: AAOx3  NEUROLOGY: non-focal  SKIN:  Diffuse body erythematous macules with intermittent ruptured blisters/scabbing most prominent on the abdomen, distal upper extremities, and lower extremity/buttocks   2  Ulcerations in the bilateral buccal mucosa. R NARE with hardened black lesion with necrotic appearance

## 2024-12-05 NOTE — H&P ADULT - PROBLEM SELECTOR PLAN 4
- Diet: soft and bite sized  - prophylactic measure- hold DVT prophylaxis for now given poss need for bx    - meds verified from [facility]

## 2024-12-05 NOTE — CONSULT NOTE ADULT - SUBJECTIVE AND OBJECTIVE BOX
HEME/ONC INITIAL CONSULT NOTE    CHIEF COMPLAINT:      HPI:  84 yo F PMHx hypothyroid, dementia, melanoma immunotherapy with nivolumab and Ipilimumab administered on November 26, 2024 subsequently sen at [facility] for rash on 11/30 (treated for presumed allergic reaction with steroid and benadryl) p/w worsening of rash with blisters with oral involvement to [facility] and transferred to [facility] for further eval. Pt was seen by outside dermatologist Dr. Husain ([phone]) 1 day prior to arrival and had a Bx at that time and was told she had TEN. [place] given  s/p solumedrol 250mg. In ED pt was remotely seen by dermatology.  (05 Dec 2024 09:11)    Interval heme/onc history:  - seen by Dr. Aman Odom on 11/12/24 to establish care  - pt initially p/w neck mass for about one year and recent nose bleeds   - 09/23/24: nasal biopsy was performed by Dr. Johnson. Pathology revealed malignant melanoma. [id number] negative  - 10/08/24: PET/CT showed intensely FDG avid left lower lobe lung nodule, intense FDG activity corresponding with thickening of the right nasal wall, intensely FDG avid bilateral cervical chain and submandibular lymph nodes. Focal FDG activity in the left hilar region. Also seen was intense FDG activity corresponding with a segment of sigmoid colon which is of unknown significance. Malignancy cannot definitively be excluded. Colonoscopic evaluation may be useful if clinically indicated.There is nonspecific thickening along the inferior right orbit. MRI correlation may be useful if clinically indicated.  - 10/30/24: underwent FNA of left and core biopsy of right level 1 lymphnodes which was c/w metastatic melanoma.  - 11/12/24: seen in office, decision made to start systemic therapy with Ipi 1 mg/kg + Nivo 3 mg/kg every 3 weeks x 4 followed by Nivo every 4 weeks  - 11/26/24: C1 ipi 56/nivo 170      REVIEW OF SYSTEMS:  As per HPI      PAST MEDICAL & SURGICAL HISTORY:  Hypothyroid  Dementia  Melanoma  Hypertension    No significant past surgical history          MEDICATIONS  (STANDING):  amLODIPine   Tablet 5 milliGRAM(s) Oral daily  ammonium lactate 12% Lotion 1 Application(s) Topical two times a day  atorvastatin 10 milliGRAM(s) Oral at bedtime  dexAMETHasone    Solution 0.5 milliGRAM(s) Oral two times a day  FIRST- Mouthwash  BLM 10 milliLiter(s) Swish and Spit four times a day  levothyroxine 50 MICROGram(s) Oral daily  memantine 10 milliGRAM(s) Oral two times a day  mupirocin 2% Ointment 1 Application(s) Topical two times a day  sertraline 50 milliGRAM(s) Oral daily  triamcinolone 0.1% Ointment 1 Application(s) Topical two times a day    MEDICATIONS  (PRN):  acetaminophen     Tablet .. 650 milliGRAM(s) Oral every 6 hours PRN Temp greater or equal to 38C (100.4F), Mild Pain (1 - 3)  aluminum hydroxide/magnesium hydroxide/simethicone Suspension 30 milliLiter(s) Oral every 4 hours PRN Dyspepsia  melatonin 3 milliGRAM(s) Oral at bedtime PRN Insomnia  ondansetron Injectable 4 milliGRAM(s) IV Push every 8 hours PRN Nausea and/or Vomiting      Allergies    Omnipaque 350 (Anaphylaxis)  penicillin (Unknown)    Intolerances        FAMILY HISTORY:      SOCIAL HISTORY:  Former smoker, quit over 20 years ago, smoked about 1ppd    VITAL SIGNS:  Vital Signs Last 24 Hrs  T(C): 36.7 (05 Dec 2024 13:18), Max: 36.8 (04 Dec 2024 22:31)  T(F): 98.1 (05 Dec 2024 13:18), Max: 98.3 (04 Dec 2024 22:31)  HR: 73 (05 Dec 2024 13:18) (69 - 79)  BP: 114/71 (05 Dec 2024 13:18) (109/62 - 145/63)  BP(mean): --  RR: 16 (05 Dec 2024 13:18) (16 - 18)  SpO2: 100% (05 Dec 2024 13:18) (95% - 100%)    Parameters below as of 05 Dec 2024 13:18  Patient On (Oxygen Delivery Method): room air        PHYSICAL EXAMINATION:      LABS:                        9.0    13.53 )-----------( 418      ( 04 Dec 2024 17:11 )             30.4     12-04    141  |  104  |  23.8[H]  ----------------------------<  85  4.4   |  27.0  |  0.52    Ca    8.7      04 Dec 2024 17:11    TPro  6.1[L]  /  Alb  3.5  /  TBili  <0.2[L]  /  DBili  x   /  AST  18  /  ALT  10  /  AlkPhos  101  12-04    PT/INR - ( 04 Dec 2024 17:11 )   PT: 11.3 sec;   INR: 1.00 ratio         PTT - ( 04 Dec 2024 17:11 )  PTT:25.2 sec  Urinalysis Basic - ( 04 Dec 2024 17:11 )    Color: x / Appearance: x / SG: x / pH: x  Gluc: 85 mg/dL / Ketone: x  / Bili: x / Urobili: x   Blood: x / Protein: x / Nitrite: x   Leuk Esterase: x / RBC: x / WBC x   Sq Epi: x / Non Sq Epi: x / Bacteria: x        RADIOLOGY & ADDITIONAL STUDIES:      IMPRESSION & RECOMMENDATIONS:   HEME/ONC INITIAL CONSULT NOTE    CHIEF COMPLAINT:  rash    HPI:  82 yo F PMHx hypothyroid, dementia, melanoma immunotherapy with nivolumab and Ipilimumab administered on November 26, 2024 subsequently sen at [facility] for rash on 11/30 (treated for presumed allergic reaction with steroid and benadryl) p/w worsening of rash with blisters with oral involvement to [facility] and transferred to [facility] for further eval. Pt was seen by outside dermatologist Dr. Husain ([phone]) 1 day prior to arrival and had a Bx at that time and was told she had TEN. [place] given  s/p solumedrol 250mg. In ED pt was remotely seen by dermatology.  (05 Dec 2024 09:11)    Interval heme/onc history:  - seen by Dr. Aman Odom on 11/12/24 to establish care  - pt initially p/w neck mass for about one year and recent nose bleeds   - 09/23/24: nasal biopsy was performed by Dr. Johnson. Pathology revealed malignant melanoma. [id number] negative  - 10/08/24: PET/CT showed intensely FDG avid left lower lobe lung nodule, intense FDG activity corresponding with thickening of the right nasal wall, intensely FDG avid bilateral cervical chain and submandibular lymph nodes. Focal FDG activity in the left hilar region. Also seen was intense FDG activity corresponding with a segment of sigmoid colon which is of unknown significance. Malignancy cannot definitively be excluded. Colonoscopic evaluation may be useful if clinically indicated.There is nonspecific thickening along the inferior right orbit. MRI correlation may be useful if clinically indicated.  - 10/30/24: underwent FNA of left and core biopsy of right level 1 lymphnodes which was c/w metastatic melanoma.  - 11/12/24: seen in office, decision made to start systemic therapy with Ipi 1 mg/kg + Nivo 3 mg/kg every 3 weeks x 4 followed by Nivo every 4 weeks  - 11/26/24: C1 ipi 56/nivo 170  Interview 12/5/24:  - pt reports her rash has occurred over a long period (unable to specify exactly how much time) and is itchy    REVIEW OF SYSTEMS:  As per HPI      PAST MEDICAL & SURGICAL HISTORY:  Hypothyroid  Dementia  Melanoma  Hypertension    No significant past surgical history      MEDICATIONS  (STANDING):  amLODIPine   Tablet 5 milliGRAM(s) Oral daily  ammonium lactate 12% Lotion 1 Application(s) Topical two times a day  atorvastatin 10 milliGRAM(s) Oral at bedtime  dexAMETHasone    Solution 0.5 milliGRAM(s) Oral two times a day  FIRST- Mouthwash  BLM 10 milliLiter(s) Swish and Spit four times a day  levothyroxine 50 MICROGram(s) Oral daily  memantine 10 milliGRAM(s) Oral two times a day  mupirocin 2% Ointment 1 Application(s) Topical two times a day  sertraline 50 milliGRAM(s) Oral daily  triamcinolone 0.1% Ointment 1 Application(s) Topical two times a day    MEDICATIONS  (PRN):  acetaminophen     Tablet .. 650 milliGRAM(s) Oral every 6 hours PRN Temp greater or equal to 38C (100.4F), Mild Pain (1 - 3)  aluminum hydroxide/magnesium hydroxide/simethicone Suspension 30 milliLiter(s) Oral every 4 hours PRN Dyspepsia  melatonin 3 milliGRAM(s) Oral at bedtime PRN Insomnia  ondansetron Injectable 4 milliGRAM(s) IV Push every 8 hours PRN Nausea and/or Vomiting      Allergies    Omnipaque 350 (Anaphylaxis)  penicillin (Unknown)    Intolerances      SOCIAL HISTORY:  Former smoker, quit over 20 years ago, smoked about 1ppd    VITAL SIGNS:  Vital Signs Last 24 Hrs  T(C): 36.7 (05 Dec 2024 13:18), Max: 36.8 (04 Dec 2024 22:31)  T(F): 98.1 (05 Dec 2024 13:18), Max: 98.3 (04 Dec 2024 22:31)  HR: 73 (05 Dec 2024 13:18) (69 - 79)  BP: 114/71 (05 Dec 2024 13:18) (109/62 - 145/63)  BP(mean): --  RR: 16 (05 Dec 2024 13:18) (16 - 18)  SpO2: 100% (05 Dec 2024 13:18) (95% - 100%)    Parameters below as of 05 Dec 2024 13:18  Patient On (Oxygen Delivery Method): room air    PHYSICAL EXAMINATION:  Constitutional: resting uncomfortably in chair OOB  HEENT: NC/AT, EOMI, anicteric sclera, no nasal discharge  Respiratory: breathing comfortably on room air  Dermatologic: (+) diffuse acneiform/blisterlike erythematous rash throughout extremities  Neurologic: responds appropriately to questions, alert  Psychiatric: affect and characteristics of appearance, verbalizations, behaviors are appropriate    LABS:                        9.0    13.53 )-----------( 418      ( 04 Dec 2024 17:11 )             30.4     12-04    141  |  104  |  23.8[H]  ----------------------------<  85  4.4   |  27.0  |  0.52    Ca    8.7      04 Dec 2024 17:11    TPro  6.1[L]  /  Alb  3.5  /  TBili  <0.2[L]  /  DBili  x   /  AST  18  /  ALT  10  /  AlkPhos  101  12-04    PT/INR - ( 04 Dec 2024 17:11 )   PT: 11.3 sec;   INR: 1.00 ratio         PTT - ( 04 Dec 2024 17:11 )  PTT:25.2 sec  Urinalysis Basic - ( 04 Dec 2024 17:11 )    Color: x / Appearance: x / SG: x / pH: x  Gluc: 85 mg/dL / Ketone: x  / Bili: x / Urobili: x   Blood: x / Protein: x / Nitrite: x   Leuk Esterase: x / RBC: x / WBC x   Sq Epi: x / Non Sq Epi: x / Bacteria: x        RADIOLOGY & ADDITIONAL STUDIES:      IMPRESSION & RECOMMENDATIONS:

## 2024-12-05 NOTE — PATIENT PROFILE ADULT - FUNCTIONAL SCREEN CURRENT LEVEL: SWALLOWING (IF SCORE 2 OR MORE FOR ANY ITEM, CONSULT REHAB SERVICES), MLM)
0 = swallows foods/liquids without difficulty S/P ear surgery  December 2014  S/P ear surgery  reconstruction 7/2014

## 2024-12-05 NOTE — H&P ADULT - PROBLEM SELECTOR PLAN 1
- p/w rash 4 days post ipi/nivo treated with steroid and benadryl now with worsening  - Derm c/s Favor bullous process, drug induced vs bullous pemphigoid vs autoimmune BP, vs other blistering disorder vs dermatitis; No conern for SJS/TEN  - Ig E level  - HSV/VZV swab of open lesion  - serum tests: send via miscellaneous lab to [facility] lab, numbers are [id number] and [id number] (/230 and dsg1/3)  - Would recommend starting triamcinolone 0.1% cream to affected areas BID, mupirocin 2% ointment to open areas BID and swish and spit mouthwash   - [place] moisturization   - f/u full derm c/s

## 2024-12-05 NOTE — H&P ADULT - PROBLEM SELECTOR PLAN 2
- initially p/w neck mass for about one year and recent nose bleeds for which nasal biopsy was performed + malignant melanoma + cervical nodes FDG avid s/p bx Metastatic Melanoma  - s/p ipi/nivo 11/26  - heme Onc c/s emailed

## 2024-12-05 NOTE — CONSULT NOTE ADULT - ASSESSMENT
83F PMH malignant melanoma s/p ipi/nivo pw rash concern for drug reaction secondary to ipi/nivo. Derm requesting onc input     PMH: Dementia, thyroid CA, diverticulosis, HTN,HLD, ?rheumatological disorder  Labs: pending    Recommend: 83F PMH malignant melanoma s/p ipi/nivo pw rash concern for drug reaction secondary to ipi/nivo. Derm requesting onc input     PMH: Dementia, thyroid CA, diverticulosis, HTN,HLD, ?rheumatological disorder    Oncology Summary:  - seen by Dr. Aman Odom on 11/12/24 to establish care  - pt initially p/w neck mass for about one year and recent nose bleeds   - 09/23/24: nasal biopsy was performed by Dr. Johnson. Pathology revealed malignant melanoma. [id number] negative  - 10/08/24: PET/CT showed intensely FDG avid left lower lobe lung nodule, intense FDG activity corresponding with thickening of the right nasal wall, intensely FDG avid bilateral cervical chain and submandibular lymph nodes. Focal FDG activity in the left hilar region. Also seen was intense FDG activity corresponding with a segment of sigmoid colon which is of unknown significance. Malignancy cannot definitively be excluded. Colonoscopic evaluation may be useful if clinically indicated.There is nonspecific thickening along the inferior right orbit. MRI correlation may be useful if clinically indicated.  - 10/30/24: underwent FNA of left and core biopsy of right level 1 lymph nodes which was c/w metastatic melanoma.  - 11/12/24: seen in office, decision made to start systemic therapy with Ipi 1 mg/kg + Nivo 3 mg/kg every 3 weeks x 4 followed by Nivo every 4 weeks  - 11/26/24: C1 ipi 56/nivo 170    Labs:   - CBC (12/4/24): WBC 13.53, HGb 9.0, Plt 418  - PT/INR/APTT WNL  - CMP (12/4/24): unremarkable  - CRP elevated to 17 (12/4/24)  - TSH WNL    Recommend:  - concern for IO-related cutaneous toxicity, which would warrant higher dose of oral prednisone at 1mg/kg/day, however however given she only had her first cycle of ipi/nivo last Tuesday 11/26, and IO-related cutaneous toxicity has a median onset of 4 weeks with general range of 2+ weeks [1], this is less likely from her immunotherapy- would defer to dermatology for management of rash 2/2 other causes such as an alternative drug, dermatitis, or pemphigus   - f/u skin biopsy performed by dermatology 12/5/24    Recommendations preliminary until attending attestation   ***************************************************************  Yair Rodriguez MD  Hematology/Oncology Fellow, PGY5  MS TEAMS  After 5pm or on weekends please contact  to page on-call fellow   ***************************************************************         [1] Management of Immune-Related Adverse Events in Patients Treated With Immune Checkpoint Inhibitor Therapy: ASCO Guideline Update. Aníbal et al. ASCO, Nov. 2021. [email or web address] 83F PMH malignant melanoma s/p ipi/nivo x1 on 11/26/24, with rash concerning for drug reaction secondary to ipi/nivo.     PMH: Dementia, thyroid CA, diverticulosis, HTN,HLD, ?rheumatological disorder    Oncology Summary:  - seen by Dr. Aman Odom on 11/12/24 to establish care  - pt initially p/w neck mass for about one year and recent nose bleeds   - 09/23/24: nasal biopsy was performed by Dr. Johnson. Pathology revealed malignant melanoma. [id number] negative  - 10/08/24: PET/CT showed intensely FDG avid left lower lobe lung nodule, intense FDG activity corresponding with thickening of the right nasal wall, intensely FDG avid bilateral cervical chain and submandibular lymph nodes. Focal FDG activity in the left hilar region. Also seen was intense FDG activity corresponding with a segment of sigmoid colon which is of unknown significance. Malignancy cannot definitively be excluded. Colonoscopic evaluation may be useful if clinically indicated.There is nonspecific thickening along the inferior right orbit. MRI correlation may be useful if clinically indicated.  - 10/30/24: underwent FNA of left and core biopsy of right level 1 lymph nodes which was c/w metastatic melanoma.  - 11/12/24: seen in office, decision made to start systemic therapy with Ipi 1 mg/kg + Nivo 3 mg/kg every 3 weeks x 4 followed by Nivo every 4 weeks  - 11/26/24: C1 ipi 56/nivo 170    Labs:   - CBC (12/4/24): WBC 13.53, HGb 9.0, Plt 418  - PT/INR/APTT WNL  - CMP (12/4/24): unremarkable  - CRP elevated to 17 (12/4/24)  - TSH WNL    Recommend:  - concern for IO-related cutaneous toxicity, which would warrant higher dose of oral prednisone at 1mg/kg/day, however given she only had her first cycle of ipi/nivo last Tuesday 11/26, and IO-related cutaneous toxicity has a median onset of 4 weeks with general range of 2+ weeks [1], this is less likely from her immunotherapy- would defer to dermatology for management of rash 2/2 other causes such as an alternative drug, dermatitis, or pemphigus   - f/u skin biopsy performed by dermatology 12/5/24    Recommendations preliminary until attending attestation   ***************************************************************  Yair Rodriguez MD  Hematology/Oncology Fellow, PGY5  MS TEAMS  After 5pm or on weekends please contact  to page on-call fellow   ***************************************************************         [1] Management of Immune-Related Adverse Events in Patients Treated With Immune Checkpoint Inhibitor Therapy: ASCO Guideline Update. Aníbal et al. ASCO, Nov. 2021. [email or web address]/[email or web address]

## 2024-12-05 NOTE — CONSULT NOTE ADULT - ATTENDING COMMENTS
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 While this has been described with ICI the timing is too fast and I suspect the rash may have even been present (as urticarial plaques/itch) prior to starting ICI. Biopsies performed to confirm diagnosis, will treat empirically with high dose topical steroids, low dose prednisone with plan to ideally bridge to steroid sparing agent such as dupixent.

## 2024-12-05 NOTE — ED PROVIDER NOTE - ATTENDING APP SHARED VISIT CONTRIBUTION OF CARE
83-year-old female past medical history of hypothyroidism, melanoma currently on immunotherapy (most recently administered on 11/26/24), transferred from [facility] for dermatology evaluation of full body rash. Per chart review: patient was treated w/ Decadron and Benadryl for same rash on 11/30/24; now stating rash is worseing w/ associated blistering and oral ulcerations. At OSH provider discussed with patient's dermatologist Dr. Husain from [place], whom was concerned for possible TEN, thus patient was transferred to [facility] ED for dermatology evaluation. Patient denies f/c, CP/SOB, arthralgias, urinary sx, or any other symptoms. VSS. Physical exam significant for diffuse erythematous macules w/ intermittent ruptured blisters/scabbing to entire body, also w/ ulcerations to b/l buccal mucosa. Discussed with Derm low suspicion for TEN or SJS. Recommending admission, starting triamcinolone and mupirocin, and full evaluation in AM.

## 2024-12-05 NOTE — CONSULT NOTE ADULT - ASSESSMENT
ASSESSMENT AND PLAN:  BP vs drug induced (ipi/nivo too soon for onset so would favor classic BP) vs dermatitis  - please order /230  - please order hepatitis and quantiferon gold serologies in preparation for other alternatives to dupixent  - f/u HSV/VZV swab, low suspicion  - f/u biopsy and DIF  - start prednisone 10mg daily until outpatient f/u, plan for dupixent outpatient  - cont dexamethasone swish and spit  - cont triamcinolone 0.1% ointment BID 2 weeks on 1 week off    Dermatology Punch Biopsy Procedure Note    After risks and benefits of procedure including bleeding, infection and scar were reviewed (consents including photo consent reviewed, signed and in chart), allergies were reviewed and time out performed. Written consent given by the patient.    Area cleaned with rubbing alcohol and anesthetized with lidocaine and epinephrine.  x2 4mm punch biopsy was performed to L ventral wrist, hemostasis achieved with a single dissolvable chromic gut suture with pressure dressing placed.   Wound care reviewed with patient and team. Biopsy site should remain covered with pressure bandage for 24-48 hours. Then apply Vaseline to biopsy site daily and cover with bandage until healed.      Seen and discussed with the dermatology attending Dr. Mcnair.  Recommendations were communicated with the primary team.    Please page [phone] with a 10-digit call-back number for further related questions.  Please re-consult Dermatology for any new or worsening developments.    Jose Enrique Quintana MD  Resident Physician, PGY-3  [facility] Dermatology  Pager: [phone]  Office: [phone]

## 2024-12-05 NOTE — ED PROVIDER NOTE - CLINICAL SUMMARY MEDICAL DECISION MAKING FREE TEXT BOX
83-year-old female past medical history of hypothyroidism, melanoma currently on immunotherapy with most recently administered on November 26, 2024 and seen for rash as per independent chart review on November 30, 2024 treated with Decadron and Benadryl presents complaining of worsening rash with associated blisters and now for the last 1 day oral involvement including ulcerations.  Patient denies any fever, chills, abdominal pain, chest pain, difficulty breathing.  States symptoms worsening despite OTC therapy.  No other complaints at this time.  Pt transferred to [facility] for derm consult as there was a concern for TEN. Pt received IV steroids at [facility] and transferred. Pt accepted by Kat Peralta of dermatology    Pictures of rash sent to dermatology resident via TEAMS. Had extensive conversation with dermatology resident who discussed with her attending regarding case. Derm not concerned for TEN or SJS at this time. They will officially consult in the morning, as they feel they do not need to emergently see pt in the ED. They recommend pt be admitted to medicine and start triamcinolone and mupirocin. 83-year-old female past medical history of hypothyroidism, melanoma currently on immunotherapy with most recently administered on November 26, 2024 and seen for rash as per independent chart review on November 30, 2024 treated with Decadron and Benadryl presents complaining of worsening rash with associated blisters and now for the last 1 day oral involvement including ulcerations.  Patient denies any fever, chills, abdominal pain, chest pain, difficulty breathing.  States symptoms worsening despite OTC therapy.  No other complaints at this time.  Pt transferred to [facility] for derm consult as there was a concern for TEN. Pt received IV steroids at [facility] and transferred. Pt accepted by Kat Peralta of dermatology    Pictures of rash sent to dermatology resident via TEAMS. Had extensive conversation with dermatology resident who discussed with her attending regarding case. Derm not concerned for TEN or SJS at this time. They will officially consult in the morning, as they feel they do not need to emergently see pt in the ED. They recommend pt be admitted to medicine and start triamcinolone and mupirocin.    R NARE with hardened black lesion with necrotic appearance ?melanoma

## 2024-12-05 NOTE — ED PROVIDER NOTE - OBJECTIVE STATEMENT
83-year-old female past medical history of hypothyroidism, melanoma currently on immunotherapy with most recently administered on November 26, 2024 and seen for rash as per independent chart review on November 30, 2024 treated with Decadron and Benadryl presents complaining of worsening rash with associated blisters and now for the last 1 day oral involvement including ulcerations.  Patient denies any fever, chills, abdominal pain, chest pain, difficulty breathing.  States symptoms worsening despite OTC therapy.  No other complaints at this time.  Pt transferred to [facility] for derm consult as there was a concern for TEN. Pt received IV steroids at [facility] and transferred. Pt accepted by Kat Peralta of dermatology. Pt states she has itchiness otherwise offers no complaints.

## 2024-12-06 LAB
ALBUMIN SERPL ELPH-MCNC: 4.3 G/DL — SIGNIFICANT CHANGE UP (ref 3.3–5)
ALP SERPL-CCNC: 116 U/L — SIGNIFICANT CHANGE UP (ref 40–120)
ALT FLD-CCNC: 12 U/L — SIGNIFICANT CHANGE UP (ref 4–33)
ANION GAP SERPL CALC-SCNC: 15 MMOL/L — HIGH (ref 7–14)
APPEARANCE UR: ABNORMAL
AST SERPL-CCNC: 19 U/L — SIGNIFICANT CHANGE UP (ref 4–32)
BASOPHILS # BLD AUTO: 0.04 K/UL — SIGNIFICANT CHANGE UP (ref 0–0.2)
BASOPHILS NFR BLD AUTO: 0.2 % — SIGNIFICANT CHANGE UP (ref 0–2)
BILIRUB SERPL-MCNC: <0.2 MG/DL — SIGNIFICANT CHANGE UP (ref 0.2–1.2)
BILIRUB UR-MCNC: NEGATIVE — SIGNIFICANT CHANGE UP
BUN SERPL-MCNC: 18 MG/DL — SIGNIFICANT CHANGE UP (ref 7–23)
CALCIUM SERPL-MCNC: 9.1 MG/DL — SIGNIFICANT CHANGE UP (ref 8.4–10.5)
CHLORIDE SERPL-SCNC: 100 MMOL/L — SIGNIFICANT CHANGE UP (ref 98–107)
CO2 SERPL-SCNC: 24 MMOL/L — SIGNIFICANT CHANGE UP (ref 22–31)
COLOR SPEC: YELLOW — SIGNIFICANT CHANGE UP
CREAT SERPL-MCNC: 0.61 MG/DL — SIGNIFICANT CHANGE UP (ref 0.5–1.3)
DIFF PNL FLD: NEGATIVE — SIGNIFICANT CHANGE UP
EGFR: 88 ML/MIN/1.73M2 — SIGNIFICANT CHANGE UP
EOSINOPHIL # BLD AUTO: 0.24 K/UL — SIGNIFICANT CHANGE UP (ref 0–0.5)
EOSINOPHIL NFR BLD AUTO: 1.4 % — SIGNIFICANT CHANGE UP (ref 0–6)
GLUCOSE SERPL-MCNC: 112 MG/DL — HIGH (ref 70–99)
GLUCOSE UR QL: NEGATIVE MG/DL — SIGNIFICANT CHANGE UP
HAV IGM SER-ACNC: SIGNIFICANT CHANGE UP
HBV CORE IGM SER-ACNC: SIGNIFICANT CHANGE UP
HBV SURFACE AG SER-ACNC: SIGNIFICANT CHANGE UP
HCT VFR BLD CALC: 32.3 % — LOW (ref 34.5–45)
HCV AB S/CO SERPL IA: 0.06 S/CO — SIGNIFICANT CHANGE UP (ref 0–0.99)
HCV AB SERPL-IMP: SIGNIFICANT CHANGE UP
HGB BLD-MCNC: 9.6 G/DL — LOW (ref 11.5–15.5)
IANC: 11.05 K/UL — HIGH (ref 1.8–7.4)
IGE SERPL-ACNC: 1706 KU/L — HIGH
IMM GRANULOCYTES NFR BLD AUTO: 0.5 % — SIGNIFICANT CHANGE UP (ref 0–0.9)
KETONES UR-MCNC: ABNORMAL MG/DL
LEUKOCYTE ESTERASE UR-ACNC: NEGATIVE — SIGNIFICANT CHANGE UP
LYMPHOCYTES # BLD AUTO: 26 % — SIGNIFICANT CHANGE UP (ref 13–44)
LYMPHOCYTES # BLD AUTO: 4.32 K/UL — HIGH (ref 1–3.3)
MCHC RBC-ENTMCNC: 22.7 PG — LOW (ref 27–34)
MCHC RBC-ENTMCNC: 29.7 G/DL — LOW (ref 32–36)
MCV RBC AUTO: 76.5 FL — LOW (ref 80–100)
MONOCYTES # BLD AUTO: 0.86 K/UL — SIGNIFICANT CHANGE UP (ref 0–0.9)
MONOCYTES NFR BLD AUTO: 5.2 % — SIGNIFICANT CHANGE UP (ref 2–14)
NEUTROPHILS # BLD AUTO: 11.05 K/UL — HIGH (ref 1.8–7.4)
NEUTROPHILS NFR BLD AUTO: 66.7 % — SIGNIFICANT CHANGE UP (ref 43–77)
NITRITE UR-MCNC: NEGATIVE — SIGNIFICANT CHANGE UP
NRBC # BLD: 0 /100 WBCS — SIGNIFICANT CHANGE UP (ref 0–0)
NRBC # FLD: 0 K/UL — SIGNIFICANT CHANGE UP (ref 0–0)
PH UR: 6 — SIGNIFICANT CHANGE UP (ref 5–8)
PLATELET # BLD AUTO: 505 K/UL — HIGH (ref 150–400)
POTASSIUM SERPL-MCNC: 3.8 MMOL/L — SIGNIFICANT CHANGE UP (ref 3.5–5.3)
POTASSIUM SERPL-SCNC: 3.8 MMOL/L — SIGNIFICANT CHANGE UP (ref 3.5–5.3)
PROT SERPL-MCNC: 7.4 G/DL — SIGNIFICANT CHANGE UP (ref 6–8.3)
PROT UR-MCNC: 100 MG/DL
RBC # BLD: 4.22 M/UL — SIGNIFICANT CHANGE UP (ref 3.8–5.2)
RBC # FLD: 17.8 % — HIGH (ref 10.3–14.5)
RBC CASTS # UR COMP ASSIST: SIGNIFICANT CHANGE UP /HPF (ref 0–4)
SODIUM SERPL-SCNC: 139 MMOL/L — SIGNIFICANT CHANGE UP (ref 135–145)
SP GR SPEC: 1.04 — HIGH (ref 1–1.03)
UROBILINOGEN FLD QL: 1 MG/DL — SIGNIFICANT CHANGE UP (ref 0.2–1)
WBC # BLD: 16.59 K/UL — HIGH (ref 3.8–10.5)
WBC # FLD AUTO: 16.59 K/UL — HIGH (ref 3.8–10.5)
WBC UR QL: SIGNIFICANT CHANGE UP /HPF (ref 0–5)

## 2024-12-06 PROCEDURE — 99232 SBSQ HOSP IP/OBS MODERATE 35: CPT

## 2024-12-06 RX ORDER — ACETAMINOPHEN, DIPHENHYDRAMINE HCL, PHENYLEPHRINE HCL 325; 25; 5 MG/1; MG/1; MG/1
6 TABLET ORAL ONCE
Refills: 0 | Status: COMPLETED | OUTPATIENT
Start: 2024-12-06 | End: 2024-12-06

## 2024-12-06 RX ORDER — HALOPERIDOL 2 MG
2.5 TABLET ORAL ONCE
Refills: 0 | Status: COMPLETED | OUTPATIENT
Start: 2024-12-06 | End: 2024-12-06

## 2024-12-06 RX ADMIN — TRIAMCINOLONE ACETONIDE 1 APPLICATION(S): 0.15 AEROSOL, SPRAY TOPICAL at 06:31

## 2024-12-06 RX ADMIN — SERTRALINE HYDROCHLORIDE 50 MILLIGRAM(S): 100 TABLET, FILM COATED ORAL at 13:01

## 2024-12-06 RX ADMIN — DIPHENHYDRAMINE HYDROCHLORIDE AND LIDOCAINE HYDROCHLORIDE AND ALUMINUM HYDROXIDE AND MAGNESIUM HYDRO 10 MILLILITER(S): KIT at 06:31

## 2024-12-06 RX ADMIN — DIPHENHYDRAMINE HYDROCHLORIDE AND LIDOCAINE HYDROCHLORIDE AND ALUMINUM HYDROXIDE AND MAGNESIUM HYDRO 10 MILLILITER(S): KIT at 11:41

## 2024-12-06 RX ADMIN — Medication 2.5 MILLIGRAM(S): at 02:57

## 2024-12-06 RX ADMIN — ACETAMINOPHEN, DIPHENHYDRAMINE HCL, PHENYLEPHRINE HCL 3 MILLIGRAM(S): 325; 25; 5 TABLET ORAL at 21:56

## 2024-12-06 RX ADMIN — Medication 10 MILLIGRAM(S): at 21:56

## 2024-12-06 RX ADMIN — DIPHENHYDRAMINE HYDROCHLORIDE AND LIDOCAINE HYDROCHLORIDE AND ALUMINUM HYDROXIDE AND MAGNESIUM HYDRO 10 MILLILITER(S): KIT at 18:00

## 2024-12-06 RX ADMIN — MEMANTINE HYDROCHLORIDE 10 MILLIGRAM(S): 14 CAPSULE, EXTENDED RELEASE ORAL at 06:31

## 2024-12-06 RX ADMIN — Medication 50 MICROGRAM(S): at 06:32

## 2024-12-06 RX ADMIN — AMMONIUM LACTATE 1 APPLICATION(S): 120 LOTION TOPICAL at 17:58

## 2024-12-06 RX ADMIN — ACETAMINOPHEN 500MG 650 MILLIGRAM(S): 500 TABLET, COATED ORAL at 22:55

## 2024-12-06 RX ADMIN — DEXAMETHASONE 0.5 MILLIGRAM(S): 1.5 TABLET ORAL at 17:59

## 2024-12-06 RX ADMIN — MEMANTINE HYDROCHLORIDE 10 MILLIGRAM(S): 14 CAPSULE, EXTENDED RELEASE ORAL at 18:00

## 2024-12-06 RX ADMIN — ACETAMINOPHEN 500MG 650 MILLIGRAM(S): 500 TABLET, COATED ORAL at 21:55

## 2024-12-06 RX ADMIN — AMLODIPINE BESYLATE 5 MILLIGRAM(S): 10 TABLET ORAL at 06:31

## 2024-12-06 RX ADMIN — AMMONIUM LACTATE 1 APPLICATION(S): 120 LOTION TOPICAL at 06:32

## 2024-12-06 RX ADMIN — PREDNISONE 10 MILLIGRAM(S): 20 TABLET ORAL at 06:31

## 2024-12-06 RX ADMIN — TRIAMCINOLONE ACETONIDE 1 APPLICATION(S): 0.15 AEROSOL, SPRAY TOPICAL at 18:00

## 2024-12-06 RX ADMIN — Medication 1 APPLICATION(S): at 18:00

## 2024-12-06 RX ADMIN — ACETAMINOPHEN, DIPHENHYDRAMINE HCL, PHENYLEPHRINE HCL 6 MILLIGRAM(S): 325; 25; 5 TABLET ORAL at 02:15

## 2024-12-06 RX ADMIN — Medication 1 APPLICATION(S): at 06:31

## 2024-12-06 RX ADMIN — DEXAMETHASONE 0.5 MILLIGRAM(S): 1.5 TABLET ORAL at 06:31

## 2024-12-06 NOTE — DIETITIAN INITIAL EVALUATION ADULT - ORAL INTAKE PTA/DIET HISTORY
Lives in an independent living facility - provided with all meals / snacks  Bfst:  Ensure with muffin or baked good  Lunch: Yogurt, small sandwich  Dinner: Hot / balanced meal; usually dessert daily

## 2024-12-06 NOTE — DIETITIAN INITIAL EVALUATION ADULT - PERTINENT LABORATORY DATA
12-06    139  |  100  |  18  ----------------------------<  112[H]  3.8   |  24  |  0.61    Ca    9.1      06 Dec 2024 03:20    TPro  7.4  /  Alb  4.3  /  TBili  <0.2  /  DBili  x   /  AST  19  /  ALT  12  /  AlkPhos  116  12-06

## 2024-12-06 NOTE — DIETITIAN INITIAL EVALUATION ADULT - PROBLEM SELECTOR PLAN 1
- p/w rash 4 days post ipi/nivo treated with steroid and benadryl now with worsening  - Derm c/s Favor bullous process, drug induced vs bullous pemphigoid vs autoimmune BP, vs other blistering disorder vs dermatitis; No conern for SJS/TEN  - Ig E level  - HSV/VZV swab of open lesion  - serum tests: send via miscellaneous lab to [facility] lab, numbers are [id number] and [id number] (/230 and dsg1/3)  - Would recommend starting triamcinolone 0.1% cream to affected areas BID, mupirocin 2% ointment to open areas BID and swish and spit mouthwash   - [place] moisturization   - f/u full derm c/s

## 2024-12-06 NOTE — DIETITIAN INITIAL EVALUATION ADULT - PERTINENT MEDS FT
MEDICATIONS  (STANDING):  amLODIPine   Tablet 5 milliGRAM(s) Oral daily  ammonium lactate 12% Lotion 1 Application(s) Topical two times a day  atorvastatin 10 milliGRAM(s) Oral at bedtime  dexAMETHasone    Solution 0.5 milliGRAM(s) Oral two times a day  FIRST- Mouthwash  BLM 10 milliLiter(s) Swish and Spit four times a day  levothyroxine 50 MICROGram(s) Oral daily  memantine 10 milliGRAM(s) Oral two times a day  mupirocin 2% Ointment 1 Application(s) Topical two times a day  predniSONE   Tablet 10 milliGRAM(s) Oral daily  sertraline 50 milliGRAM(s) Oral daily  triamcinolone 0.1% Ointment 1 Application(s) Topical two times a day    MEDICATIONS  (PRN):  acetaminophen     Tablet .. 650 milliGRAM(s) Oral every 6 hours PRN Temp greater or equal to 38C (100.4F), Mild Pain (1 - 3)  aluminum hydroxide/magnesium hydroxide/simethicone Suspension 30 milliLiter(s) Oral every 4 hours PRN Dyspepsia  melatonin 3 milliGRAM(s) Oral at bedtime PRN Insomnia  ondansetron Injectable 4 milliGRAM(s) IV Push every 8 hours PRN Nausea and/or Vomiting

## 2024-12-06 NOTE — DIETITIAN INITIAL EVALUATION ADULT - OTHER CALCULATIONS
Based on 12/5 weight per HIE - as more consistent with usual body weight, suspect adm weight error (68kg)

## 2024-12-06 NOTE — DIETITIAN INITIAL EVALUATION ADULT - OTHER INFO
Nutrition assessment for consult as above.    84 yo F PMHx hypothyroid, dementia, melanoma immunotherapy with nivolumab and Ipilimumab administered on November 26, 2024 subsequently sen at [place] for rash on 11/30 (treated for presumed allergic reaction with steroid and benadryl) p/w worsening of rash with blisters with oral involvement to [facility] and transferred to [facility] for further eval. - concern for IO-related cutaneous toxicity, which would warrant higher dose of oral prednisone at 1mg/kg/day, however given she only had her first cycle of ipi/nivo last Tuesday 11/26, and IO-related cutaneous toxicity has a median onset of 4 weeks with general range of 2+ weeks [1], this is less likely from her immunotherapy- would defer to dermatology for management of rash 2/2 other causes such as an alternative drug, dermatitis, or pemphigus; f/u skin biopsy performed by dermatology 12/5/24.      Patient with fair PO intake per RN flowsheet. Patient confused, unable to provide collateral.  RD contacted emergency contact listed in chart (Tiffany) and she provided collateral information.  Patient reported to have fair appetite, never ate excessively, but eats meals regularly at living facility and was being provided with Ensure supplement daily.  No h/o chewing or swallowing issues, however, patient developed mouth sores after treatment and had verbalized discomfort.  Patient currently on soft and bite sized diet regarding same and getting dexamethasone swish and spit.  Reported usual body weight ~120 pounds (54.5kg), no reported weight loss per discussion with family member. Adm weight 68kg likely in error as not consistent with reported usual body weight and/or weights recorded in HIE sectionof chart. Per HIE weight hx: 12/5 - 58kg, 11/30 - 59kg, 11/26 - 54.3kg, 11/12 - 55kg. Continue to trend inpatient weights.

## 2024-12-06 NOTE — PROGRESS NOTE ADULT - PROBLEM SELECTOR PLAN 1
- p/w rash 4 days post ipi/nivo treated with steroid and benadryl now with worsening  - Derm c/s Favor bullous process, drug induced vs bullous pemphigoid vs autoimmune BP, vs other blistering disorder vs dermatitis; No conern for SJS/TEN f/u biopy  - Ig E level  - HSV/VZV swab of open lesion  - serum tests: send via miscellaneous lab to [facility] lab, numbers are [id number] and [id number] (/230 and dsg1/3)  - Would recommend starting triamcinolone 0.1% cream to affected areas BID, mupirocin 2% ointment to open areas BID and swish and spit mouthwash   - [place] moisturization   - f/u full derm c/s - p/w rash 4 days post ipi/nivo treated with steroid and benadryl now with worsening  - Derm c/s Favor bullous process, drug induced vs bullous pemphigoid vs autoimmune BP, vs other blistering disorder vs dermatitis; No conern for SJS/TEN f/u biopy  - Ig E level  - HSV/VZV swab of open lesion  - serum tests: send via miscellaneous lab to [facility] lab, numbers are [id number] and [id number] (/230 and dsg1/3)  - Would recommend starting triamcinolone 0.1% cream to affected areas BID, mupirocin 2% ointment to open areas BID and swish and spit mouthwash   - [place] moisturization

## 2024-12-06 NOTE — PROGRESS NOTE ADULT - PROBLEM SELECTOR PLAN 2
- initially p/w neck mass for about one year and recent nose bleeds for which nasal biopsy was performed + malignant melanoma + cervical nodes FDG avid s/p bx Metastatic Melanoma  - s/p ipi/nivo 11/26  - heme Onc c/s emailed - initially p/w neck mass for about one year and recent nose bleeds for which nasal biopsy was performed + malignant melanoma + cervical nodes FDG avid s/p bx Metastatic Melanoma  - s/p ipi/nivo 11/26  - heme Onc consulted

## 2024-12-07 LAB
ANION GAP SERPL CALC-SCNC: 13 MMOL/L — SIGNIFICANT CHANGE UP (ref 7–14)
BUN SERPL-MCNC: 17 MG/DL — SIGNIFICANT CHANGE UP (ref 7–23)
CALCIUM SERPL-MCNC: 8.1 MG/DL — LOW (ref 8.4–10.5)
CHLORIDE SERPL-SCNC: 104 MMOL/L — SIGNIFICANT CHANGE UP (ref 98–107)
CO2 SERPL-SCNC: 24 MMOL/L — SIGNIFICANT CHANGE UP (ref 22–31)
CREAT SERPL-MCNC: 0.67 MG/DL — SIGNIFICANT CHANGE UP (ref 0.5–1.3)
EGFR: 86 ML/MIN/1.73M2 — SIGNIFICANT CHANGE UP
GLUCOSE SERPL-MCNC: 91 MG/DL — SIGNIFICANT CHANGE UP (ref 70–99)
HCT VFR BLD CALC: 35.9 % — SIGNIFICANT CHANGE UP (ref 34.5–45)
HGB BLD-MCNC: 10.6 G/DL — LOW (ref 11.5–15.5)
MAGNESIUM SERPL-MCNC: 2.1 MG/DL — SIGNIFICANT CHANGE UP (ref 1.6–2.6)
MCHC RBC-ENTMCNC: 22.9 PG — LOW (ref 27–34)
MCHC RBC-ENTMCNC: 29.5 G/DL — LOW (ref 32–36)
MCV RBC AUTO: 77.5 FL — LOW (ref 80–100)
NRBC # BLD: 0 /100 WBCS — SIGNIFICANT CHANGE UP (ref 0–0)
NRBC # FLD: 0 K/UL — SIGNIFICANT CHANGE UP (ref 0–0)
PHOSPHATE SERPL-MCNC: 3.6 MG/DL — SIGNIFICANT CHANGE UP (ref 2.5–4.5)
PLATELET # BLD AUTO: 496 K/UL — HIGH (ref 150–400)
POTASSIUM SERPL-MCNC: 3.7 MMOL/L — SIGNIFICANT CHANGE UP (ref 3.5–5.3)
POTASSIUM SERPL-SCNC: 3.7 MMOL/L — SIGNIFICANT CHANGE UP (ref 3.5–5.3)
RBC # BLD: 4.63 M/UL — SIGNIFICANT CHANGE UP (ref 3.8–5.2)
RBC # FLD: 17.9 % — HIGH (ref 10.3–14.5)
SODIUM SERPL-SCNC: 141 MMOL/L — SIGNIFICANT CHANGE UP (ref 135–145)
WBC # BLD: 15.66 K/UL — HIGH (ref 3.8–10.5)
WBC # FLD AUTO: 15.66 K/UL — HIGH (ref 3.8–10.5)

## 2024-12-07 PROCEDURE — 93010 ELECTROCARDIOGRAM REPORT: CPT

## 2024-12-07 PROCEDURE — 99233 SBSQ HOSP IP/OBS HIGH 50: CPT

## 2024-12-07 RX ORDER — CALAMINE
1 LOTION (ML) TOPICAL ONCE
Refills: 0 | Status: COMPLETED | OUTPATIENT
Start: 2024-12-07 | End: 2024-12-07

## 2024-12-07 RX ORDER — VANCOMYCIN HCL 900 MCG/MG
750 POWDER (GRAM) MISCELLANEOUS EVERY 24 HOURS
Refills: 0 | Status: DISCONTINUED | OUTPATIENT
Start: 2024-12-08 | End: 2024-12-08

## 2024-12-07 RX ORDER — HYDROXYZINE HYDROCHLORIDE 25 MG/1
25 TABLET, FILM COATED ORAL ONCE
Refills: 0 | Status: COMPLETED | OUTPATIENT
Start: 2024-12-07 | End: 2024-12-08

## 2024-12-07 RX ORDER — HYDROXYZINE HYDROCHLORIDE 25 MG/1
25 TABLET, FILM COATED ORAL ONCE
Refills: 0 | Status: COMPLETED | OUTPATIENT
Start: 2024-12-07 | End: 2024-12-07

## 2024-12-07 RX ORDER — VANCOMYCIN HCL 900 MCG/MG
POWDER (GRAM) MISCELLANEOUS
Refills: 0 | Status: DISCONTINUED | OUTPATIENT
Start: 2024-12-07 | End: 2024-12-07

## 2024-12-07 RX ORDER — ENOXAPARIN SODIUM 30 MG/.3ML
40 INJECTION SUBCUTANEOUS EVERY 24 HOURS
Refills: 0 | Status: DISCONTINUED | OUTPATIENT
Start: 2024-12-07 | End: 2024-12-10

## 2024-12-07 RX ORDER — VANCOMYCIN HCL 900 MCG/MG
750 POWDER (GRAM) MISCELLANEOUS ONCE
Refills: 0 | Status: COMPLETED | OUTPATIENT
Start: 2024-12-07 | End: 2024-12-07

## 2024-12-07 RX ORDER — VANCOMYCIN HCL 900 MCG/MG
POWDER (GRAM) MISCELLANEOUS
Refills: 0 | Status: DISCONTINUED | OUTPATIENT
Start: 2024-12-07 | End: 2024-12-08

## 2024-12-07 RX ADMIN — MEMANTINE HYDROCHLORIDE 10 MILLIGRAM(S): 14 CAPSULE, EXTENDED RELEASE ORAL at 06:18

## 2024-12-07 RX ADMIN — Medication 1 APPLICATION(S): at 21:04

## 2024-12-07 RX ADMIN — Medication 1 APPLICATION(S): at 18:23

## 2024-12-07 RX ADMIN — DEXAMETHASONE 0.5 MILLIGRAM(S): 1.5 TABLET ORAL at 18:21

## 2024-12-07 RX ADMIN — DIPHENHYDRAMINE HYDROCHLORIDE AND LIDOCAINE HYDROCHLORIDE AND ALUMINUM HYDROXIDE AND MAGNESIUM HYDRO 10 MILLILITER(S): KIT at 18:23

## 2024-12-07 RX ADMIN — Medication 1 APPLICATION(S): at 06:17

## 2024-12-07 RX ADMIN — TRIAMCINOLONE ACETONIDE 1 APPLICATION(S): 0.15 AEROSOL, SPRAY TOPICAL at 06:17

## 2024-12-07 RX ADMIN — HYDROXYZINE HYDROCHLORIDE 25 MILLIGRAM(S): 25 TABLET, FILM COATED ORAL at 01:59

## 2024-12-07 RX ADMIN — DIPHENHYDRAMINE HYDROCHLORIDE AND LIDOCAINE HYDROCHLORIDE AND ALUMINUM HYDROXIDE AND MAGNESIUM HYDRO 10 MILLILITER(S): KIT at 06:17

## 2024-12-07 RX ADMIN — Medication 250 MILLIGRAM(S): at 18:21

## 2024-12-07 RX ADMIN — DIPHENHYDRAMINE HYDROCHLORIDE AND LIDOCAINE HYDROCHLORIDE AND ALUMINUM HYDROXIDE AND MAGNESIUM HYDRO 10 MILLILITER(S): KIT at 00:07

## 2024-12-07 RX ADMIN — SERTRALINE HYDROCHLORIDE 50 MILLIGRAM(S): 100 TABLET, FILM COATED ORAL at 11:08

## 2024-12-07 RX ADMIN — AMMONIUM LACTATE 1 APPLICATION(S): 120 LOTION TOPICAL at 06:17

## 2024-12-07 RX ADMIN — Medication 50 MICROGRAM(S): at 06:18

## 2024-12-07 RX ADMIN — MEMANTINE HYDROCHLORIDE 10 MILLIGRAM(S): 14 CAPSULE, EXTENDED RELEASE ORAL at 18:23

## 2024-12-07 RX ADMIN — AMLODIPINE BESYLATE 5 MILLIGRAM(S): 10 TABLET ORAL at 06:18

## 2024-12-07 RX ADMIN — DEXAMETHASONE 0.5 MILLIGRAM(S): 1.5 TABLET ORAL at 06:38

## 2024-12-07 RX ADMIN — AMMONIUM LACTATE 1 APPLICATION(S): 120 LOTION TOPICAL at 18:20

## 2024-12-07 RX ADMIN — DIPHENHYDRAMINE HYDROCHLORIDE AND LIDOCAINE HYDROCHLORIDE AND ALUMINUM HYDROXIDE AND MAGNESIUM HYDRO 10 MILLILITER(S): KIT at 11:04

## 2024-12-07 RX ADMIN — Medication 10 MILLIGRAM(S): at 21:04

## 2024-12-07 RX ADMIN — PREDNISONE 10 MILLIGRAM(S): 20 TABLET ORAL at 06:17

## 2024-12-07 RX ADMIN — ENOXAPARIN SODIUM 40 MILLIGRAM(S): 30 INJECTION SUBCUTANEOUS at 18:20

## 2024-12-07 RX ADMIN — TRIAMCINOLONE ACETONIDE 1 APPLICATION(S): 0.15 AEROSOL, SPRAY TOPICAL at 18:24

## 2024-12-07 NOTE — PROGRESS NOTE ADULT - PROBLEM SELECTOR PLAN 2
- initially p/w neck mass for about one year and recent nose bleeds for which nasal biopsy was performed + malignant melanoma + cervical nodes FDG avid s/p bx Metastatic Melanoma  - s/p ipi/nivo 11/26  - heme Onc consulted

## 2024-12-07 NOTE — PROGRESS NOTE ADULT - PROBLEM SELECTOR PLAN 1
- p/w rash 4 days post ipi/nivo treated with steroid and benadryl now with worsening  - Derm c/s Favor bullous process, drug induced vs bullous pemphigoid vs autoimmune BP, vs other blistering disorder vs dermatitis; No conern for SJS/TEN f/u biopy  - Ig E level  - HSV/VZV swab of open lesion - negative.   - serum tests: send via miscellaneous lab to [facility] lab, numbers are [id number] and [id number] (/230 and dsg1/3)  - Would recommend starting triamcinolone 0.1% cream to affected areas BID, mupirocin 2% ointment to open areas BID and swish and spit mouthwash   - [place] moisturization  - skin biopsy performed by derm on 12/5  - wound culture growing staph aureus, give 1 dose of vancomycin awaiting PCR for MRSA.

## 2024-12-07 NOTE — PROGRESS NOTE ADULT - ASSESSMENT
84 yo F w/ hx dementia, melanoma on nivolumab and Ipilimumab p/w rash and concern for adverse drug reaction. Dermatology following, biopsy performed.

## 2024-12-08 LAB
-  CLINDAMYCIN: SIGNIFICANT CHANGE UP
-  ERYTHROMYCIN: SIGNIFICANT CHANGE UP
-  GENTAMICIN: SIGNIFICANT CHANGE UP
-  OXACILLIN: SIGNIFICANT CHANGE UP
-  PENICILLIN: SIGNIFICANT CHANGE UP
-  RIFAMPIN: SIGNIFICANT CHANGE UP
-  TETRACYCLINE: SIGNIFICANT CHANGE UP
-  TRIMETHOPRIM/SULFAMETHOXAZOLE: SIGNIFICANT CHANGE UP
-  VANCOMYCIN: SIGNIFICANT CHANGE UP
ANION GAP SERPL CALC-SCNC: 11 MMOL/L — SIGNIFICANT CHANGE UP (ref 7–14)
BUN SERPL-MCNC: 18 MG/DL — SIGNIFICANT CHANGE UP (ref 7–23)
CALCIUM SERPL-MCNC: 8 MG/DL — LOW (ref 8.4–10.5)
CHLORIDE SERPL-SCNC: 104 MMOL/L — SIGNIFICANT CHANGE UP (ref 98–107)
CO2 SERPL-SCNC: 24 MMOL/L — SIGNIFICANT CHANGE UP (ref 22–31)
CREAT SERPL-MCNC: 0.69 MG/DL — SIGNIFICANT CHANGE UP (ref 0.5–1.3)
CULTURE RESULTS: ABNORMAL
EGFR: 86 ML/MIN/1.73M2 — SIGNIFICANT CHANGE UP
GLUCOSE SERPL-MCNC: 90 MG/DL — SIGNIFICANT CHANGE UP (ref 70–99)
HCT VFR BLD CALC: 36.9 % — SIGNIFICANT CHANGE UP (ref 34.5–45)
HGB BLD-MCNC: 10.7 G/DL — LOW (ref 11.5–15.5)
MAGNESIUM SERPL-MCNC: 2.2 MG/DL — SIGNIFICANT CHANGE UP (ref 1.6–2.6)
MCHC RBC-ENTMCNC: 22.3 PG — LOW (ref 27–34)
MCHC RBC-ENTMCNC: 29 G/DL — LOW (ref 32–36)
MCV RBC AUTO: 76.9 FL — LOW (ref 80–100)
METHOD TYPE: SIGNIFICANT CHANGE UP
MRSA PCR RESULT.: SIGNIFICANT CHANGE UP
NRBC # BLD: 0 /100 WBCS — SIGNIFICANT CHANGE UP (ref 0–0)
NRBC # FLD: 0 K/UL — SIGNIFICANT CHANGE UP (ref 0–0)
ORGANISM # SPEC MICROSCOPIC CNT: ABNORMAL
ORGANISM # SPEC MICROSCOPIC CNT: ABNORMAL
PHOSPHATE SERPL-MCNC: 3.3 MG/DL — SIGNIFICANT CHANGE UP (ref 2.5–4.5)
PLATELET # BLD AUTO: 515 K/UL — HIGH (ref 150–400)
POTASSIUM SERPL-MCNC: 3.7 MMOL/L — SIGNIFICANT CHANGE UP (ref 3.5–5.3)
POTASSIUM SERPL-SCNC: 3.7 MMOL/L — SIGNIFICANT CHANGE UP (ref 3.5–5.3)
RBC # BLD: 4.8 M/UL — SIGNIFICANT CHANGE UP (ref 3.8–5.2)
RBC # FLD: 17.5 % — HIGH (ref 10.3–14.5)
S AUREUS DNA NOSE QL NAA+PROBE: DETECTED
SODIUM SERPL-SCNC: 139 MMOL/L — SIGNIFICANT CHANGE UP (ref 135–145)
SPECIMEN SOURCE: SIGNIFICANT CHANGE UP
WBC # BLD: 18.33 K/UL — HIGH (ref 3.8–10.5)
WBC # FLD AUTO: 18.33 K/UL — HIGH (ref 3.8–10.5)

## 2024-12-08 PROCEDURE — 99233 SBSQ HOSP IP/OBS HIGH 50: CPT

## 2024-12-08 RX ORDER — CEFTRIAXONE SODIUM 1 G
1000 VIAL (EA) INJECTION ONCE
Refills: 0 | Status: COMPLETED | OUTPATIENT
Start: 2024-12-08 | End: 2024-12-08

## 2024-12-08 RX ORDER — CEFTRIAXONE SODIUM 1 G
1000 VIAL (EA) INJECTION EVERY 24 HOURS
Refills: 0 | Status: DISCONTINUED | OUTPATIENT
Start: 2024-12-09 | End: 2024-12-09

## 2024-12-08 RX ORDER — CEFTRIAXONE SODIUM 1 G
VIAL (EA) INJECTION
Refills: 0 | Status: DISCONTINUED | OUTPATIENT
Start: 2024-12-08 | End: 2024-12-09

## 2024-12-08 RX ADMIN — Medication 10 MILLIGRAM(S): at 21:11

## 2024-12-08 RX ADMIN — MEMANTINE HYDROCHLORIDE 10 MILLIGRAM(S): 14 CAPSULE, EXTENDED RELEASE ORAL at 06:29

## 2024-12-08 RX ADMIN — Medication 50 MICROGRAM(S): at 06:30

## 2024-12-08 RX ADMIN — Medication 1 APPLICATION(S): at 18:11

## 2024-12-08 RX ADMIN — AMLODIPINE BESYLATE 5 MILLIGRAM(S): 10 TABLET ORAL at 06:29

## 2024-12-08 RX ADMIN — MEMANTINE HYDROCHLORIDE 10 MILLIGRAM(S): 14 CAPSULE, EXTENDED RELEASE ORAL at 17:57

## 2024-12-08 RX ADMIN — ACETAMINOPHEN, DIPHENHYDRAMINE HCL, PHENYLEPHRINE HCL 3 MILLIGRAM(S): 325; 25; 5 TABLET ORAL at 00:00

## 2024-12-08 RX ADMIN — DIPHENHYDRAMINE HYDROCHLORIDE AND LIDOCAINE HYDROCHLORIDE AND ALUMINUM HYDROXIDE AND MAGNESIUM HYDRO 10 MILLILITER(S): KIT at 00:00

## 2024-12-08 RX ADMIN — SERTRALINE HYDROCHLORIDE 50 MILLIGRAM(S): 100 TABLET, FILM COATED ORAL at 17:57

## 2024-12-08 RX ADMIN — PREDNISONE 10 MILLIGRAM(S): 20 TABLET ORAL at 06:29

## 2024-12-08 RX ADMIN — DEXAMETHASONE 0.5 MILLIGRAM(S): 1.5 TABLET ORAL at 06:30

## 2024-12-08 RX ADMIN — DIPHENHYDRAMINE HYDROCHLORIDE AND LIDOCAINE HYDROCHLORIDE AND ALUMINUM HYDROXIDE AND MAGNESIUM HYDRO 10 MILLILITER(S): KIT at 13:41

## 2024-12-08 RX ADMIN — AMMONIUM LACTATE 1 APPLICATION(S): 120 LOTION TOPICAL at 06:30

## 2024-12-08 RX ADMIN — HYDROXYZINE HYDROCHLORIDE 25 MILLIGRAM(S): 25 TABLET, FILM COATED ORAL at 00:00

## 2024-12-08 RX ADMIN — DEXAMETHASONE 0.5 MILLIGRAM(S): 1.5 TABLET ORAL at 17:58

## 2024-12-08 RX ADMIN — Medication 1 APPLICATION(S): at 06:31

## 2024-12-08 RX ADMIN — TRIAMCINOLONE ACETONIDE 1 APPLICATION(S): 0.15 AEROSOL, SPRAY TOPICAL at 18:11

## 2024-12-08 RX ADMIN — Medication 100 MILLIGRAM(S): at 18:06

## 2024-12-08 RX ADMIN — DIPHENHYDRAMINE HYDROCHLORIDE AND LIDOCAINE HYDROCHLORIDE AND ALUMINUM HYDROXIDE AND MAGNESIUM HYDRO 10 MILLILITER(S): KIT at 23:14

## 2024-12-08 RX ADMIN — DIPHENHYDRAMINE HYDROCHLORIDE AND LIDOCAINE HYDROCHLORIDE AND ALUMINUM HYDROXIDE AND MAGNESIUM HYDRO 10 MILLILITER(S): KIT at 18:11

## 2024-12-08 RX ADMIN — TRIAMCINOLONE ACETONIDE 1 APPLICATION(S): 0.15 AEROSOL, SPRAY TOPICAL at 06:31

## 2024-12-08 RX ADMIN — ENOXAPARIN SODIUM 40 MILLIGRAM(S): 30 INJECTION SUBCUTANEOUS at 17:56

## 2024-12-08 RX ADMIN — DIPHENHYDRAMINE HYDROCHLORIDE AND LIDOCAINE HYDROCHLORIDE AND ALUMINUM HYDROXIDE AND MAGNESIUM HYDRO 10 MILLILITER(S): KIT at 06:30

## 2024-12-08 RX ADMIN — AMMONIUM LACTATE 1 APPLICATION(S): 120 LOTION TOPICAL at 18:10

## 2024-12-08 NOTE — PROGRESS NOTE ADULT - PROBLEM SELECTOR PLAN 1
- p/w rash 4 days post ipi/nivo treated with steroid and benadryl now with worsening  - Derm c/s Favor bullous process, drug induced vs bullous pemphigoid vs autoimmune BP, vs other blistering disorder vs dermatitis; No conern for SJS/TEN f/u biopy  - Ig E level  - HSV/VZV swab of open lesion - negative.   - serum tests: send via miscellaneous lab to [facility] lab, numbers are [id number] and [id number] (/230 and dsg1/3)  - Would recommend starting triamcinolone 0.1% cream to affected areas BID, mupirocin 2% ointment to open areas BID and swish and spit mouthwash   - [place] moisturization  - skin biopsy performed by derm on 12/5  - wound culture growing staph aureus, gave 1 dose of vancomycin.   - culture with MSSA, h/o Pen allergy as a child, doesn't remember reaction, but denied any reaction involving facial or lip swelling. will do ceftriaxone 1g qd.   - check blood culture.

## 2024-12-08 NOTE — PROGRESS NOTE ADULT - CARDIOVASCULAR
normal/regular rate and rhythm/S1 S2 present/no gallops/no rub/no murmur
normal/regular rate and rhythm/S1 S2 present/no gallops/no rub/no murmur
Never

## 2024-12-08 NOTE — PROGRESS NOTE ADULT - ASSESSMENT
84 yo F w/ hx dementia, melanoma on nivolumab and Ipilimumab p/w rash and concern for adverse drug reaction. Dermatology following, biopsy performed. Skin culture grew staph aur. started on empiric abx.

## 2024-12-08 NOTE — PROGRESS NOTE ADULT - SKIN COMMENTS
diffuse papular/bullous rash, stable. some area now with dry flaking.
diffuse papular rash over b/l upper ext, neck, chest, abdomen, upper thighs and some lesions in LE. some bullous lesions, more prominent in the arms.

## 2024-12-09 ENCOUNTER — TRANSCRIPTION ENCOUNTER (OUTPATIENT)
Age: 84
End: 2024-12-09

## 2024-12-09 LAB
ANION GAP SERPL CALC-SCNC: 12 MMOL/L — SIGNIFICANT CHANGE UP (ref 7–14)
BUN SERPL-MCNC: 17 MG/DL — SIGNIFICANT CHANGE UP (ref 7–23)
CALCIUM SERPL-MCNC: 7.9 MG/DL — LOW (ref 8.4–10.5)
CHLORIDE SERPL-SCNC: 106 MMOL/L — SIGNIFICANT CHANGE UP (ref 98–107)
CO2 SERPL-SCNC: 23 MMOL/L — SIGNIFICANT CHANGE UP (ref 22–31)
CREAT SERPL-MCNC: 0.72 MG/DL — SIGNIFICANT CHANGE UP (ref 0.5–1.3)
EGFR: 82 ML/MIN/1.73M2 — SIGNIFICANT CHANGE UP
GLUCOSE SERPL-MCNC: 98 MG/DL — SIGNIFICANT CHANGE UP (ref 70–99)
HCT VFR BLD CALC: 34.1 % — LOW (ref 34.5–45)
HGB BLD-MCNC: 10.1 G/DL — LOW (ref 11.5–15.5)
MAGNESIUM SERPL-MCNC: 2.1 MG/DL — SIGNIFICANT CHANGE UP (ref 1.6–2.6)
MCHC RBC-ENTMCNC: 22.5 PG — LOW (ref 27–34)
MCHC RBC-ENTMCNC: 29.6 G/DL — LOW (ref 32–36)
MCV RBC AUTO: 76.1 FL — LOW (ref 80–100)
NRBC # BLD: 0 /100 WBCS — SIGNIFICANT CHANGE UP (ref 0–0)
NRBC # FLD: 0 K/UL — SIGNIFICANT CHANGE UP (ref 0–0)
PHOSPHATE SERPL-MCNC: 3.3 MG/DL — SIGNIFICANT CHANGE UP (ref 2.5–4.5)
PLATELET # BLD AUTO: 459 K/UL — HIGH (ref 150–400)
POTASSIUM SERPL-MCNC: 3.7 MMOL/L — SIGNIFICANT CHANGE UP (ref 3.5–5.3)
POTASSIUM SERPL-SCNC: 3.7 MMOL/L — SIGNIFICANT CHANGE UP (ref 3.5–5.3)
RBC # BLD: 4.48 M/UL — SIGNIFICANT CHANGE UP (ref 3.8–5.2)
RBC # FLD: 17.5 % — HIGH (ref 10.3–14.5)
SODIUM SERPL-SCNC: 141 MMOL/L — SIGNIFICANT CHANGE UP (ref 135–145)
WBC # BLD: 17.66 K/UL — HIGH (ref 3.8–10.5)
WBC # FLD AUTO: 17.66 K/UL — HIGH (ref 3.8–10.5)

## 2024-12-09 PROCEDURE — 99233 SBSQ HOSP IP/OBS HIGH 50: CPT | Mod: GC

## 2024-12-09 PROCEDURE — 99232 SBSQ HOSP IP/OBS MODERATE 35: CPT

## 2024-12-09 RX ORDER — CLOBETASOL PROPIONATE 0.05 %
1 OINTMENT (GRAM) TOPICAL
Refills: 0 | Status: DISCONTINUED | OUTPATIENT
Start: 2024-12-09 | End: 2024-12-10

## 2024-12-09 RX ADMIN — AMMONIUM LACTATE 1 APPLICATION(S): 120 LOTION TOPICAL at 05:45

## 2024-12-09 RX ADMIN — Medication 1 APPLICATION(S): at 18:24

## 2024-12-09 RX ADMIN — AMMONIUM LACTATE 1 APPLICATION(S): 120 LOTION TOPICAL at 18:27

## 2024-12-09 RX ADMIN — Medication 10 MILLIGRAM(S): at 21:15

## 2024-12-09 RX ADMIN — TRIAMCINOLONE ACETONIDE 1 APPLICATION(S): 0.15 AEROSOL, SPRAY TOPICAL at 18:23

## 2024-12-09 RX ADMIN — DEXAMETHASONE 0.5 MILLIGRAM(S): 1.5 TABLET ORAL at 05:44

## 2024-12-09 RX ADMIN — DIPHENHYDRAMINE HYDROCHLORIDE AND LIDOCAINE HYDROCHLORIDE AND ALUMINUM HYDROXIDE AND MAGNESIUM HYDRO 10 MILLILITER(S): KIT at 05:44

## 2024-12-09 RX ADMIN — Medication 50 MICROGRAM(S): at 05:44

## 2024-12-09 RX ADMIN — MEMANTINE HYDROCHLORIDE 10 MILLIGRAM(S): 14 CAPSULE, EXTENDED RELEASE ORAL at 18:25

## 2024-12-09 RX ADMIN — DIPHENHYDRAMINE HYDROCHLORIDE AND LIDOCAINE HYDROCHLORIDE AND ALUMINUM HYDROXIDE AND MAGNESIUM HYDRO 10 MILLILITER(S): KIT at 18:25

## 2024-12-09 RX ADMIN — AMLODIPINE BESYLATE 5 MILLIGRAM(S): 10 TABLET ORAL at 05:44

## 2024-12-09 RX ADMIN — MEMANTINE HYDROCHLORIDE 10 MILLIGRAM(S): 14 CAPSULE, EXTENDED RELEASE ORAL at 05:43

## 2024-12-09 RX ADMIN — SERTRALINE HYDROCHLORIDE 50 MILLIGRAM(S): 100 TABLET, FILM COATED ORAL at 11:19

## 2024-12-09 RX ADMIN — PREDNISONE 10 MILLIGRAM(S): 20 TABLET ORAL at 05:43

## 2024-12-09 RX ADMIN — DIPHENHYDRAMINE HYDROCHLORIDE AND LIDOCAINE HYDROCHLORIDE AND ALUMINUM HYDROXIDE AND MAGNESIUM HYDRO 10 MILLILITER(S): KIT at 11:19

## 2024-12-09 RX ADMIN — Medication 1 APPLICATION(S): at 05:45

## 2024-12-09 RX ADMIN — TRIAMCINOLONE ACETONIDE 1 APPLICATION(S): 0.15 AEROSOL, SPRAY TOPICAL at 05:45

## 2024-12-09 RX ADMIN — DIPHENHYDRAMINE HYDROCHLORIDE AND LIDOCAINE HYDROCHLORIDE AND ALUMINUM HYDROXIDE AND MAGNESIUM HYDRO 10 MILLILITER(S): KIT at 23:21

## 2024-12-09 RX ADMIN — DEXAMETHASONE 0.5 MILLIGRAM(S): 1.5 TABLET ORAL at 18:26

## 2024-12-09 RX ADMIN — Medication 100 MILLIGRAM(S): at 11:24

## 2024-12-09 RX ADMIN — ENOXAPARIN SODIUM 40 MILLIGRAM(S): 30 INJECTION SUBCUTANEOUS at 18:26

## 2024-12-09 NOTE — PROGRESS NOTE ADULT - ASSESSMENT
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 (ipi/nivo too soon for onset so would favor classic BP) vs dermatitis  - fu /230  - f/u hepatitis and quantiferon gold serologies in preparation for other alternatives to dupixent  - HSV negative, this is not cellulitis  - f/u biopsy and DIF  - cont prednisone 10mg daily until outpatient f/u, plan for dupixent outpatient  - stop dexamethasone swish and spit  - cont triamcinolone 0.1% ointment BID 2 weeks on 1 week off    Patient can follow up with us in the [facility] located at [address] [place] [address] [address], [place] [address] upon discharge. Our office will call to schedule an appointment but if patient does not hear from us within a few days of discharge, please instruct patient to call our office. Office phone number is [phone].    Seen and discussed with the dermatology attending Dr. Higgins.  Recommendations were communicated with the primary team.    Please page [phone] with a 10-digit call-back number for further related questions.  Please re-consult Dermatology for any new or worsening developments.    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 (ipi/nivo too soon for onset so would favor classic BP) vs dermatitis  - fu /230  - f/u hepatitis and quantiferon gold serologies in preparation for other alternatives to dupixent  - HSV negative, this is not cellulitis  - f/u biopsy and DIF  - cont prednisone 10mg daily until outpatient f/u, plan for dupixent outpatient  - stop dexamethasone swish and spit  - cont triamcinolone 0.1% ointment BID 2 weeks on 1 week off (for skinfolds and face)  - start clobetasol 0.05% ointment BID to affected areas for up to 2 weeks on, then 1 week off for rest of body  - cont mupirocin 2% ointment BID to open erosions    Patient can follow up with us in the [facility] located at [address] [place] [address] [address], [place] [address] upon discharge. Our office will call to schedule an appointment but if patient does not hear from us within a few days of discharge, please instruct patient to call our office. Office phone number is [phone].    Seen and discussed with the dermatology attending Dr. Higgins.  Recommendations were communicated with the primary team.    Please page [phone] with a 10-digit call-back number for further related questions.  Please re-consult Dermatology for any new or worsening developments.    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 (ipi/nivo too soon for onset so would favor classic BP) vs dermatitis  - fu /230  - f/u hepatitis and quantiferon gold serologies in preparation for other alternatives to dupixent  - HSV negative, this is not cellulitis  - f/u biopsy and DIF  - cont prednisone 10mg daily until outpatient f/u  - stop dexamethasone swish and spit  - cont triamcinolone 0.1% ointment BID 2 weeks on 1 week off (for skinfolds and face)  - start clobetasol 0.05% ointment BID to affected areas for up to 2 weeks on, then 1 week off for rest of body  - cont mupirocin 2% ointment BID to open erosions    Patient can follow up with us in the [facility] located at [address] [place] [address] upon discharge. Our office will call to schedule an appointment but if patient does not hear from us within a few days of discharge, please instruct patient to call our office. Office phone number is [phone].    Seen and discussed with the dermatology attending Dr. Higgins.  Recommendations were communicated with the primary team.    Please page [phone] with a 10-digit call-back number for further related questions.  Please re-consult Dermatology for any new or worsening developments.    Jose Enrique Quintana MD  Resident Physician, PGY-3  [facility]  Pager: [phone]  Office: [phone]

## 2024-12-09 NOTE — DISCHARGE NOTE PROVIDER - NSDCMRMEDTOKEN_GEN_ALL_CORE_FT
levothyroxine: 50 microgram(s) once a day  memantine 10 mg oral tablet: 1 tab(s) orally 2 times a day  Norvasc 5 mg oral tablet: 1 tab(s) orally once a day  pravastatin 20 mg oral tablet: 1 tab(s) orally once a day  Sertraline: 50 milligram(s) once a day   ammonium lactate 12% topical lotion: Apply topically to affected area 2 times a day 1 Apply topically to affected area 2 times a day  clobetasol 0.05% topical ointment: Apply topically to affected area 2 times a day 1 Apply topically to affected area 2 times a day affected areas for up to 2 weeks on, then 1 week off for rest of the body  levothyroxine: 50 microgram(s) once a day  memantine 10 mg oral tablet: 1 tab(s) orally 2 times a day  mupirocin 2% topical ointment: Apply topically to affected area 2 times a day 1 Apply topically to affected area 2 times a day to open erosions  Norvasc 5 mg oral tablet: 1 tab(s) orally once a day  pravastatin 20 mg oral tablet: 1 tab(s) orally once a day  predniSONE 10 mg oral tablet: 1 tab(s) orally once a day MDD: 10mg  Sertraline: 50 milligram(s) once a day  triamcinolone 0.1% topical ointment: Apply topically to affected area 2 times a day 1 Apply topically to affected area 2 times a day 2 weeks on and 1 week off (for skinfolds and face)

## 2024-12-09 NOTE — DISCHARGE NOTE PROVIDER - PROVIDER TOKENS
FREE:[LAST:[dermatology],PHONE:[(   )    -],FAX:[(   )    -],ADDRESS:[[facility] located at [address] [place] [address]. [place] [address] upon discharge.     Our office will call to schedule an appointment but if patient does not hear from us within a few days of discharge, please instruct patient to call our office. Office phone number is [phone].]]

## 2024-12-09 NOTE — PROGRESS NOTE ADULT - ATTENDING COMMENTS
Rash clinically consistent with bullous pemphigoid. Would switch to clobetasol and continue triamcinolone. Will f/u BPAG Abs and the biopsy results. Additional long-term treatment may be considered in the outpatient setting.    Glen Higgins MD

## 2024-12-09 NOTE — PROGRESS NOTE ADULT - PROBLEM SELECTOR PLAN 1
- p/w rash 4 days post ipi/nivo treated with steroid and benadryl now with worsening  - Derm c/s Favor bullous process, drug induced vs bullous pemphigoid vs autoimmune BP, vs other blistering disorder vs dermatitis; No conern for SJS/TEN f/u biopy  - Ig E level  - HSV/VZV swab of open lesion - negative.   - serum tests: send via miscellaneous lab to [facility] lab, numbers are [id number] and [id number] (/230 and dsg1/3)  - per derm, started on triamcinolone 0.1% cream to affected areas BID (2 weeks on/1 week off), mupirocin 2% ointment to open areas BID and swish and spit mouthwash. cont prednisone 10m qd  - [place] moisturization  - skin biopsy performed by derm on 12/5-pending result  - wound culture growing staph aureus, Received one dose vancomycin, culture is MSSA, will dc further vancomycin, cont ceftriaxone  - culture with MSSA, h/o Pen allergy as a child, doesn't remember reaction, but denied any reaction involving facial or lip swelling.  -quant goal pending, hepatitis panel negative  -leukocytosis likely 2/2 prednisone

## 2024-12-09 NOTE — DISCHARGE NOTE PROVIDER - CARE PROVIDER_API CALL
dermatology,   [facility] located at [address] [place] [address]. [address], [place] [address] upon discharge.     Our office will call to schedule an appointment but if patient does not hear from us within a few days of discharge, please instruct patient to call our office. Office phone number is [phone].  Phone: (   )    -  Fax: (   )    -  Follow Up Time:

## 2024-12-09 NOTE — PHYSICAL THERAPY INITIAL EVALUATION ADULT - ADDITIONAL COMMENTS
Did You Provide Opioid Counseling: No Patient report lives alone in house, 3 steps to enter, ambulates with out assistive device.

## 2024-12-09 NOTE — DISCHARGE NOTE PROVIDER - NSDCCPCAREPLAN_GEN_ALL_CORE_FT
PRINCIPAL DISCHARGE DIAGNOSIS  Diagnosis: Rash  Assessment and Plan of Treatment: You were admitted for a diffuse skin rash.  You had skin biopsy, please follow up with dermatology regarding the biopsy result. please continue to take medications and apply ointment as prescribed and follow directions. Follow up at dermatology clinic.     PRINCIPAL DISCHARGE DIAGNOSIS  Diagnosis: Rash  Assessment and Plan of Treatment: You were admitted for a diffuse skin rash.  You had skin biopsy, please follow up with dermatology regarding the biopsy result. please continue to take medications and apply ointment as prescribed and follow directions. Follow up at dermatology clinic.  Follow up at:   [facility] located at [address] [place] [address]. [address], [place] [address] upon discharge. Our office will call to schedule an appointment but if patient does not hear from us within a few days of discharge, please instruct patient to call our office. Office phone number is [phone].

## 2024-12-09 NOTE — PROGRESS NOTE ADULT - PROBLEM/PLAN-3
DISPLAY PLAN FREE TEXT
"I got bit by a bug right below my right elbow on 7/24 or 7/25. It looked like a pimple at first. I went to my PCP on 7/27, he squeezed it & put me on Augmentin which I started yesterday & took 3 doses of so far. The pain is worse today"

## 2024-12-09 NOTE — PROGRESS NOTE ADULT - PROBLEM SELECTOR PLAN 4
DVT: Lovenox   Diet: soft  Dispo: PT pending.
- Diet: soft and bite sized  - prophylactic measure- hold DVT prophylaxis for now given poss need for bx
DVT: Lovenox   Diet: soft  Dispo: PT pending.
DVT: Lovenox   Diet: soft  Dispo: PT pending.

## 2024-12-09 NOTE — DISCHARGE NOTE PROVIDER - HOSPITAL COURSE
HPI:  84 yo F PMHx hypothyroid, dementia, melanoma immunotherapy with nivolumab and Ipilimumab administered on November 26, 2024 subsequently sent at [place] for rash on 11/30 (treated for presumed allergic reaction with steroid and benadryl) p/w worsening of rash with blisters with oral involvement to [facility] and transferred to [facility] for further eval. Pt was seen by outside dermatologist Dr. Husain ([phone]) 1 day prior to arrival and had a Bx at that time and was told she had TEN. [place] given  s/p solumedrol 250mg. In ED pt was remotely seen by dermatology.  (05 Dec 2024 09:11)    Hospital Course:  Evaluated by Derm, likely bullous process, drug induced vs bullous pemphigoid vs autoimmune BP, vs other blistering disorder vs dermatitis; No conern for SJS/TEN. Skin biopsy performed, resulted pending but culture grew MSSA. started on iv ceftriaxone. Derm recommends triamcinolone 0.1% cream to affected areas BID (2 weeks on/1 week off), mupirocin 2% ointment to open areas BID and swish and spit mouthwash. continued on prednisone 10m qd.     Important Medication Changes and Reason: Continue oral prednisone, topical triamcinolone and mupirocin    Active or Pending Issues Requiring Follow-up: Derm biopsy results pending, f/u with dermatology. F/u with oncology.     Discharge Diagnoses:  Dermatitis, possible Bullous pemphigoid   HPI:  84 yo F PMHx hypothyroid, dementia, melanoma immunotherapy with nivolumab and Ipilimumab administered on November 26, 2024 subsequently sent at [place] for rash on 11/30 (treated for presumed allergic reaction with steroid and benadryl) p/w worsening of rash with blisters with oral involvement to [facility] and transferred to [facility] for further eval. Pt was seen by outside dermatologist Dr. Husain ([phone]) 1 day prior to arrival and had a Bx at that time and was told she had TEN. [place] given  s/p solumedrol 250mg. In ED pt was remotely seen by dermatology.  (05 Dec 2024 09:11)    Hospital Course:  Evaluated by Derm, likely bullous process, drug induced vs bullous pemphigoid vs autoimmune BP, vs other blistering disorder vs dermatitis; No conern for SJS/TEN. Skin biopsy performed, resulted pending but culture grew MSSA. started on iv ceftriaxone. Derm recommends triamcinolone 0.1% cream to affected areas BID (2 weeks on/1 week off), mupirocin 2% ointment to open areas BID, clobetasol BID. received swish and spit mouthwash. continued on prednisone 10m qd.     Important Medication Changes and Reason: Continue oral prednisone, topical triamcinolone and mupirocin, clobetasol    Active or Pending Issues Requiring Follow-up: Derm biopsy results pending, f/u with dermatology. F/u with oncology.     Discharge Diagnoses:  Dermatitis, possible Bullous pemphigoid

## 2024-12-09 NOTE — PROGRESS NOTE ADULT - SUBJECTIVE AND OBJECTIVE BOX
Patient is a 83y old  Female who presents with a chief complaint of rash (07 Dec 2024 12:44)    Subjective:  NAEO. vitals afebrile, VSS. leukocytosis. denies worsening rash, causes mild itching and has blistering. denies fever/chills.     MEDICATIONS  (STANDING):  amLODIPine   Tablet 5 milliGRAM(s) Oral daily  ammonium lactate 12% Lotion 1 Application(s) Topical two times a day  atorvastatin 10 milliGRAM(s) Oral at bedtime  cefTRIAXone   IVPB 1000 milliGRAM(s) IV Intermittent every 24 hours  cefTRIAXone   IVPB      dexAMETHasone    Solution 0.5 milliGRAM(s) Oral two times a day  enoxaparin Injectable 40 milliGRAM(s) SubCutaneous every 24 hours  FIRST- Mouthwash  BLM 10 milliLiter(s) Swish and Spit four times a day  levothyroxine 50 MICROGram(s) Oral daily  memantine 10 milliGRAM(s) Oral two times a day  mupirocin 2% Ointment 1 Application(s) Topical two times a day  predniSONE   Tablet 10 milliGRAM(s) Oral daily  sertraline 50 milliGRAM(s) Oral daily  triamcinolone 0.1% Ointment 1 Application(s) Topical two times a day    MEDICATIONS  (PRN):  acetaminophen     Tablet .. 650 milliGRAM(s) Oral every 6 hours PRN Temp greater or equal to 38C (100.4F), Mild Pain (1 - 3)  aluminum hydroxide/magnesium hydroxide/simethicone Suspension 30 milliLiter(s) Oral every 4 hours PRN Dyspepsia  melatonin 3 milliGRAM(s) Oral at bedtime PRN Insomnia  ondansetron Injectable 4 milliGRAM(s) IV Push every 8 hours PRN Nausea and/or Vomiting        Vital Signs Last 24 Hrs  T(C): 36.9 (09 Dec 2024 12:06), Max: 37.2 (09 Dec 2024 05:38)  T(F): 98.4 (09 Dec 2024 12:06), Max: 99 (09 Dec 2024 05:38)  HR: 71 (09 Dec 2024 12:06) (71 - 80)  BP: 128/73 (09 Dec 2024 12:06) (117/58 - 128/73)  BP(mean): --  RR: 18 (09 Dec 2024 12:06) (18 - 18)  SpO2: 99% (09 Dec 2024 12:06) (99% - 100%)    Parameters below as of 09 Dec 2024 12:06  Patient On (Oxygen Delivery Method): room air            LABS:                                     10.1   17.66 )-----------( 459      ( 09 Dec 2024 05:30 )             34.1     12-09    141  |  106  |  17  ----------------------------<  98  3.7   |  23  |  0.72    Ca    7.9[L]      09 Dec 2024 05:30  Phos  3.3     12-09  Mg     2.10     12-09        Urinalysis Basic - ( 09 Dec 2024 05:30 )    Color: x / Appearance: x / SG: x / pH: x  Gluc: 98 mg/dL / Ketone: x  / Bili: x / Urobili: x   Blood: x / Protein: x / Nitrite: x   Leuk Esterase: x / RBC: x / WBC x   Sq Epi: x / Non Sq Epi: x / Bacteria: x      CAPILLARY BLOOD GLUCOSE          RADIOLOGY & ADDITIONAL TESTS: Reviewed.      RADIOLOGY & ADDITIONAL TESTS:  Results Reviewed: Y  Imaging Personally Reviewed: Y  Electrocardiogram Personally Reviewed: Y    COORDINATION OF CARE:  Care Discussed with Consultants/Other Providers [Y/N]: Y  Prior or Outpatient Records Reviewed [Y/N]: Y  
[facility] Division of Hospital Medicine  Forrest Putnam MD  In House Pager [phone]    Patient is a 83y old  Female who presents with a chief complaint of rash (07 Dec 2024 12:44)    OVERNIGHT EVENTS: no acute events.   SUBJECTIVE: no new subjective symptoms.   ROS: Denied Fever, Chill, CP, SOB, Abd pain, N/V/D, LE swelling or pain.     MEDICATIONS  (STANDING):  amLODIPine   Tablet 5 milliGRAM(s) Oral daily  ammonium lactate 12% Lotion 1 Application(s) Topical two times a day  atorvastatin 10 milliGRAM(s) Oral at bedtime  dexAMETHasone    Solution 0.5 milliGRAM(s) Oral two times a day  enoxaparin Injectable 40 milliGRAM(s) SubCutaneous every 24 hours  FIRST- Mouthwash  BLM 10 milliLiter(s) Swish and Spit four times a day  levothyroxine 50 MICROGram(s) Oral daily  memantine 10 milliGRAM(s) Oral two times a day  mupirocin 2% Ointment 1 Application(s) Topical two times a day  predniSONE   Tablet 10 milliGRAM(s) Oral daily  sertraline 50 milliGRAM(s) Oral daily  triamcinolone 0.1% Ointment 1 Application(s) Topical two times a day  vancomycin  IVPB 750 milliGRAM(s) IV Intermittent every 24 hours  vancomycin  IVPB        MEDICATIONS  (PRN):  acetaminophen     Tablet .. 650 milliGRAM(s) Oral every 6 hours PRN Temp greater or equal to 38C (100.4F), Mild Pain (1 - 3)  aluminum hydroxide/magnesium hydroxide/simethicone Suspension 30 milliLiter(s) Oral every 4 hours PRN Dyspepsia  melatonin 3 milliGRAM(s) Oral at bedtime PRN Insomnia  ondansetron Injectable 4 milliGRAM(s) IV Push every 8 hours PRN Nausea and/or Vomiting    CAPILLARY BLOOD GLUCOSE        I&O's Summary      Vital Signs Last 24 Hrs  T(C): 36.8 (08 Dec 2024 05:50), Max: 36.9 (07 Dec 2024 14:46)  T(F): 98.2 (08 Dec 2024 05:50), Max: 98.4 (07 Dec 2024 14:46)  HR: 75 (08 Dec 2024 05:50) (65 - 75)  BP: 138/64 (08 Dec 2024 05:50) (125/60 - 138/64)  BP(mean): --  RR: 18 (08 Dec 2024 05:50) (17 - 18)  SpO2: 100% (08 Dec 2024 05:50) (97% - 100%)    Parameters below as of 08 Dec 2024 05:50  Patient On (Oxygen Delivery Method): room air        LABS:                        10.7   18.33 )-----------( 515      ( 08 Dec 2024 06:16 )             36.9     12-08    139  |  104  |  18  ----------------------------<  90  3.7   |  24  |  0.69    Ca    8.0[L]      08 Dec 2024 06:16  Phos  3.3     12-08  Mg     2.20     12-08            Urinalysis Basic - ( 08 Dec 2024 06:16 )    Color: x / Appearance: x / SG: x / pH: x  Gluc: 90 mg/dL / Ketone: x  / Bili: x / Urobili: x   Blood: x / Protein: x / Nitrite: x   Leuk Esterase: x / RBC: x / WBC x   Sq Epi: x / Non Sq Epi: x / Bacteria: x        RADIOLOGY & ADDITIONAL TESTS:  Results Reviewed: Y  Imaging Personally Reviewed: Y  Electrocardiogram Personally Reviewed: Y    COORDINATION OF CARE:  Care Discussed with Consultants/Other Providers [Y/N]: Y  Prior or Outpatient Records Reviewed [Y/N]: Y  
INTERVAL HPI/OVERNIGHT EVENTS:  more blisters today    MEDICATIONS  (STANDING):  amLODIPine   Tablet 5 milliGRAM(s) Oral daily  ammonium lactate 12% Lotion 1 Application(s) Topical two times a day  atorvastatin 10 milliGRAM(s) Oral at bedtime  dexAMETHasone    Solution 0.5 milliGRAM(s) Oral two times a day  enoxaparin Injectable 40 milliGRAM(s) SubCutaneous every 24 hours  FIRST- Mouthwash  BLM 10 milliLiter(s) Swish and Spit four times a day  levothyroxine 50 MICROGram(s) Oral daily  memantine 10 milliGRAM(s) Oral two times a day  mupirocin 2% Ointment 1 Application(s) Topical two times a day  predniSONE   Tablet 10 milliGRAM(s) Oral daily  sertraline 50 milliGRAM(s) Oral daily  triamcinolone 0.1% Ointment 1 Application(s) Topical two times a day    MEDICATIONS  (PRN):  acetaminophen     Tablet .. 650 milliGRAM(s) Oral every 6 hours PRN Temp greater or equal to 38C (100.4F), Mild Pain (1 - 3)  aluminum hydroxide/magnesium hydroxide/simethicone Suspension 30 milliLiter(s) Oral every 4 hours PRN Dyspepsia  melatonin 3 milliGRAM(s) Oral at bedtime PRN Insomnia  ondansetron Injectable 4 milliGRAM(s) IV Push every 8 hours PRN Nausea and/or Vomiting      Allergies    Omnipaque 350 (Anaphylaxis)  penicillin (Unknown)    Intolerances              Vital Signs Last 24 Hrs  T(C): 36.9 (09 Dec 2024 12:06), Max: 37.2 (09 Dec 2024 05:38)  T(F): 98.4 (09 Dec 2024 12:06), Max: 99 (09 Dec 2024 05:38)  HR: 71 (09 Dec 2024 12:06) (71 - 80)  BP: 128/73 (09 Dec 2024 12:06) (117/58 - 128/73)  BP(mean): --  RR: 18 (09 Dec 2024 12:06) (18 - 18)  SpO2: 99% (09 Dec 2024 12:06) (99% - 100%)    Parameters below as of 09 Dec 2024 12:06  Patient On (Oxygen Delivery Method): room air          PHYSICAL EXAM:    The patient was in NAD.  OP showed no ulcerations.  There was no visible LAD.  Conjunctiva were non-injected.  There was no clubbing or edema of extremities.  The scalp, hair, face, eyebrows, lips, OP, neck, chest, back, extremities x4, and nails were examined.  There was no hyperhidrosis or bromhidrosis.    Of note on skin exam: erythematous edematous pink scaly plaques and erosions diffusely on trunk and extremities with tense bullae on upper extremities, proximal lower extremities, palms and ventral wrist  mouth clear today    LABS:                        10.1   17.66 )-----------( 459      ( 09 Dec 2024 05:30 )             34.1     12-09    141  |  106  |  17  ----------------------------<  98  3.7   |  23  |  0.72    Ca    7.9[L]      09 Dec 2024 05:30  Phos  3.3     12-09  Mg     2.10     12-09        Urinalysis Basic - ( 09 Dec 2024 05:30 )    Color: x / Appearance: x / SG: x / pH: x  Gluc: 98 mg/dL / Ketone: x  / Bili: x / Urobili: x   Blood: x / Protein: x / Nitrite: x   Leuk Esterase: x / RBC: x / WBC x   Sq Epi: x / Non Sq Epi: x / Bacteria: x        RADIOLOGY & ADDITIONAL TESTS: reviewed; no pertinent findings
Jama Nava MD  [employer]ist  Pager [phone]  Email: [email or web address]  Available on [employer] Teams        PROGRESS NOTE:     Patient is a 83y old  Female who presents with a chief complaint of Rash and other nonspecific skin eruption     (06 Dec 2024 09:50)      SUBJECTIVE / OVERNIGHT EVENTS:  Patient seen and examined this afternoon. No new complaints.  ADDITIONAL REVIEW OF SYSTEMS:  Denies f/c/n/v    MEDICATIONS  (STANDING):  amLODIPine   Tablet 5 milliGRAM(s) Oral daily  ammonium lactate 12% Lotion 1 Application(s) Topical two times a day  atorvastatin 10 milliGRAM(s) Oral at bedtime  dexAMETHasone    Solution 0.5 milliGRAM(s) Oral two times a day  FIRST- Mouthwash  BLM 10 milliLiter(s) Swish and Spit four times a day  levothyroxine 50 MICROGram(s) Oral daily  memantine 10 milliGRAM(s) Oral two times a day  mupirocin 2% Ointment 1 Application(s) Topical two times a day  predniSONE   Tablet 10 milliGRAM(s) Oral daily  sertraline 50 milliGRAM(s) Oral daily  triamcinolone 0.1% Ointment 1 Application(s) Topical two times a day    MEDICATIONS  (PRN):  acetaminophen     Tablet .. 650 milliGRAM(s) Oral every 6 hours PRN Temp greater or equal to 38C (100.4F), Mild Pain (1 - 3)  aluminum hydroxide/magnesium hydroxide/simethicone Suspension 30 milliLiter(s) Oral every 4 hours PRN Dyspepsia  melatonin 3 milliGRAM(s) Oral at bedtime PRN Insomnia  ondansetron Injectable 4 milliGRAM(s) IV Push every 8 hours PRN Nausea and/or Vomiting      CAPILLARY BLOOD GLUCOSE        I&O's Summary      PHYSICAL EXAM:  Vital Signs Last 24 Hrs  T(C): 36.5 (06 Dec 2024 17:22), Max: 36.7 (06 Dec 2024 10:11)  T(F): 97.7 (06 Dec 2024 17:22), Max: 98.1 (06 Dec 2024 10:11)  HR: 73 (06 Dec 2024 17:22) (66 - 73)  BP: 140/52 (06 Dec 2024 17:22) (135/70 - 140/52)  BP(mean): --  RR: 17 (06 Dec 2024 17:22) (16 - 17)  SpO2: 100% (06 Dec 2024 17:22) (100% - 100%)    Parameters below as of 06 Dec 2024 17:22  Patient On (Oxygen Delivery Method): room air      CONSTITUTIONAL: NAD, pleasant  RESPIRATORY: Normal respiratory effort; no respiratory distress, CTAB  CARDIOVASCULAR: No visible JVD, No lower extremity edema; S1S2, no m,r,g  ABDOMEN: Not guarding, does not appear distended, BS+  MUSCLOSKELETAL: no clubbing or cyanosis of digits; no joint swelling   PSYCH: AOx3  Dermatologic: (+) diffuse acneiform/blisterlike erythematous rash throughout extremities      LABS:                        9.6    16.59 )-----------( 505      ( 06 Dec 2024 03:20 )             32.3     12-06    139  |  100  |  18  ----------------------------<  112[H]  3.8   |  24  |  0.61    Ca    9.1      06 Dec 2024 03:20    TPro  7.4  /  Alb  4.3  /  TBili  <0.2  /  DBili  x   /  AST  19  /  ALT  12  /  AlkPhos  116  12-06          Urinalysis Basic - ( 06 Dec 2024 03:20 )    Color: x / Appearance: x / SG: x / pH: x  Gluc: 112 mg/dL / Ketone: x  / Bili: x / Urobili: x   Blood: x / Protein: x / Nitrite: x   Leuk Esterase: x / RBC: x / WBC x   Sq Epi: x / Non Sq Epi: x / Bacteria: x        Culture - Wound Aerobic (collected 05 Dec 2024 09:56)  Source: Skin/Wound  Preliminary Report (06 Dec 2024 13:34):    Insufficient growth-culture reincubated        RADIOLOGY & ADDITIONAL TESTS:  Results Reviewed:   Imaging Personally Reviewed:  Electrocardiogram Personally Reviewed:    COORDINATION OF CARE:  Care Discussed with Consultants/Other Providers [Y/N]:  Prior or Outpatient Records Reviewed [Y/N]:  
[facility] Division of Hospital Medicine  Forrest Putnam MD  In House Pager [phone]    Patient is a 83y old  Female who presents with a chief complaint of rash (06 Dec 2024 18:35)    OVERNIGHT EVENTS: no acute events.   SUBJECTIVE: no new subjective symptoms. no acute discomfort, rash is irritating, but otherwise feels ok.   ROS: Denied Fever, Chill, CP, SOB, Abd pain, N/V/D, LE swelling or pain.     MEDICATIONS  (STANDING):  amLODIPine   Tablet 5 milliGRAM(s) Oral daily  ammonium lactate 12% Lotion 1 Application(s) Topical two times a day  atorvastatin 10 milliGRAM(s) Oral at bedtime  dexAMETHasone    Solution 0.5 milliGRAM(s) Oral two times a day  FIRST- Mouthwash  BLM 10 milliLiter(s) Swish and Spit four times a day  levothyroxine 50 MICROGram(s) Oral daily  memantine 10 milliGRAM(s) Oral two times a day  mupirocin 2% Ointment 1 Application(s) Topical two times a day  predniSONE   Tablet 10 milliGRAM(s) Oral daily  sertraline 50 milliGRAM(s) Oral daily  triamcinolone 0.1% Ointment 1 Application(s) Topical two times a day    MEDICATIONS  (PRN):  acetaminophen     Tablet .. 650 milliGRAM(s) Oral every 6 hours PRN Temp greater or equal to 38C (100.4F), Mild Pain (1 - 3)  aluminum hydroxide/magnesium hydroxide/simethicone Suspension 30 milliLiter(s) Oral every 4 hours PRN Dyspepsia  melatonin 3 milliGRAM(s) Oral at bedtime PRN Insomnia  ondansetron Injectable 4 milliGRAM(s) IV Push every 8 hours PRN Nausea and/or Vomiting    CAPILLARY BLOOD GLUCOSE        I&O's Summary      Vital Signs Last 24 Hrs  T(C): 36.7 (07 Dec 2024 10:33), Max: 36.8 (06 Dec 2024 21:44)  T(F): 98.1 (07 Dec 2024 10:33), Max: 98.2 (06 Dec 2024 21:44)  HR: 71 (07 Dec 2024 10:33) (64 - 73)  BP: 127/66 (07 Dec 2024 10:33) (127/46 - 140/52)  BP(mean): --  RR: 18 (07 Dec 2024 10:33) (17 - 18)  SpO2: 98% (07 Dec 2024 10:33) (98% - 100%)    Parameters below as of 07 Dec 2024 10:33  Patient On (Oxygen Delivery Method): room air        LABS:                        10.6   15.66 )-----------( 496      ( 07 Dec 2024 06:17 )             35.9     12-07    141  |  104  |  17  ----------------------------<  91  3.7   |  24  |  0.67    Ca    8.1[L]      07 Dec 2024 06:17  Phos  3.6     12-07  Mg     2.10     12-07    TPro  7.4  /  Alb  4.3  /  TBili  <0.2  /  DBili  x   /  AST  19  /  ALT  12  /  AlkPhos  116  12-06          Urinalysis Basic - ( 07 Dec 2024 06:17 )    Color: x / Appearance: x / SG: x / pH: x  Gluc: 91 mg/dL / Ketone: x  / Bili: x / Urobili: x   Blood: x / Protein: x / Nitrite: x   Leuk Esterase: x / RBC: x / WBC x   Sq Epi: x / Non Sq Epi: x / Bacteria: x        Culture - Wound Aerobic (collected 05 Dec 2024 09:56)  Source: Skin/Wound  Preliminary Report (07 Dec 2024 11:13):    Rare Staphylococcus aureus      RADIOLOGY & ADDITIONAL TESTS:  Results Reviewed: Y  Imaging Personally Reviewed: Y  Electrocardiogram Personally Reviewed: Y    COORDINATION OF CARE:  Care Discussed with Consultants/Other Providers [Y/N]: Y  Prior or Outpatient Records Reviewed [Y/N]: Y

## 2024-12-09 NOTE — PHYSICAL THERAPY INITIAL EVALUATION ADULT - PERTINENT HX OF CURRENT PROBLEM, REHAB EVAL
Patient is 83 year old female, history of dementia, melanoma on nivolumab and Ipilimumab presents with rash and concern for adverse drug reaction. Dermatology following, biopsy performed. Skin culture grew staph aur.

## 2024-12-09 NOTE — DISCHARGE NOTE PROVIDER - ATTENDING DISCHARGE PHYSICAL EXAMINATION:
VITAL SIGNS:  T(C): 36.8 (12-10-24 @ 05:48), Max: 36.9 (12-09-24 @ 12:06)  T(F): 98.3 (12-10-24 @ 05:48), Max: 98.4 (12-09-24 @ 12:06)  HR: 75 (12-10-24 @ 05:48) (71 - 75)  BP: 130/65 (12-10-24 @ 05:48) (128/73 - 158/69)  BP(mean): --  RR: 19 (12-10-24 @ 05:48) (18 - 19)  SpO2: 99% (12-10-24 @ 05:48) (99% - 99%)  Wt(kg): --    PHYSICAL EXAM:  Constitutional: NAD  Cardiac: +S1/S2; RRR; no M/R/G  Gastrointestinal: soft, NT/ND; no rebound or guarding; +BSx4  Extremities: WWP, no clubbing or cyanosis; no peripheral edema  Musculoskeletal: NROM x4; no joint swelling, tenderness or erythema  Vascular: 2+ radial, DP/PT pulses B/  Neurologic: AAOx3; CNII-XII grossly intact; no focal deficits   VITAL SIGNS:  T(C): 36.8 (12-10-24 @ 05:48), Max: 36.9 (12-09-24 @ 12:06)  T(F): 98.3 (12-10-24 @ 05:48), Max: 98.4 (12-09-24 @ 12:06)  HR: 75 (12-10-24 @ 05:48) (71 - 75)  BP: 130/65 (12-10-24 @ 05:48) (128/73 - 158/69)  BP(mean): --  RR: 19 (12-10-24 @ 05:48) (18 - 19)  SpO2: 99% (12-10-24 @ 05:48) (99% - 99%)  Wt(kg): --    PHYSICAL EXAM:  Constitutional: NAD  Cardiac: +S1/S2; RRR; no M/R/G  Gastrointestinal: soft, NT/ND; no rebound or guarding; +BSx4  Extremities: WWP, no clubbing or cyanosis; no peripheral edema  SKin:diffuse erythemaouts, bullous rash  Neurologic: AAOx3; CNII-XII grossly intact; no focal deficits

## 2024-12-10 ENCOUNTER — TRANSCRIPTION ENCOUNTER (OUTPATIENT)
Age: 84
End: 2024-12-10

## 2024-12-10 VITALS
TEMPERATURE: 98 F | SYSTOLIC BLOOD PRESSURE: 126 MMHG | DIASTOLIC BLOOD PRESSURE: 68 MMHG | OXYGEN SATURATION: 99 % | RESPIRATION RATE: 18 BRPM | HEART RATE: 74 BPM

## 2024-12-10 DIAGNOSIS — Z71.89 OTHER SPECIFIED COUNSELING: ICD-10-CM

## 2024-12-10 DIAGNOSIS — F03.90 UNSPECIFIED DEMENTIA, UNSPECIFIED SEVERITY, WITHOUT BEHAVIORAL DISTURBANCE, PSYCHOTIC DISTURBANCE, MOOD DISTURBANCE, AND ANXIETY: ICD-10-CM

## 2024-12-10 DIAGNOSIS — Z51.5 ENCOUNTER FOR PALLIATIVE CARE: ICD-10-CM

## 2024-12-10 PROBLEM — I10 ESSENTIAL (PRIMARY) HYPERTENSION: Chronic | Status: ACTIVE | Noted: 2024-12-05

## 2024-12-10 LAB
BP 180, S: 171 RU/ML — HIGH
BP 230, S: 35 RU/ML — HIGH

## 2024-12-10 PROCEDURE — 99497 ADVNCD CARE PLAN 30 MIN: CPT | Mod: 25

## 2024-12-10 PROCEDURE — 99223 1ST HOSP IP/OBS HIGH 75: CPT

## 2024-12-10 PROCEDURE — 99232 SBSQ HOSP IP/OBS MODERATE 35: CPT

## 2024-12-10 RX ORDER — CLOBETASOL PROPIONATE 0.05 %
1 OINTMENT (GRAM) TOPICAL
Qty: 1 | Refills: 0
Start: 2024-12-10 | End: 2025-01-08

## 2024-12-10 RX ORDER — PREDNISONE 20 MG/1
1 TABLET ORAL
Qty: 14 | Refills: 0
Start: 2024-12-10 | End: 2024-12-23

## 2024-12-10 RX ORDER — AMMONIUM LACTATE 120 MG/G
1 LOTION TOPICAL
Qty: 1 | Refills: 0
Start: 2024-12-10

## 2024-12-10 RX ORDER — TRIAMCINOLONE ACETONIDE 0.15 MG/G
1 AEROSOL, SPRAY TOPICAL
Qty: 1 | Refills: 0
Start: 2024-12-10

## 2024-12-10 RX ADMIN — DIPHENHYDRAMINE HYDROCHLORIDE AND LIDOCAINE HYDROCHLORIDE AND ALUMINUM HYDROXIDE AND MAGNESIUM HYDRO 10 MILLILITER(S): KIT at 05:26

## 2024-12-10 RX ADMIN — AMLODIPINE BESYLATE 5 MILLIGRAM(S): 10 TABLET ORAL at 05:27

## 2024-12-10 RX ADMIN — Medication 1 APPLICATION(S): at 06:14

## 2024-12-10 RX ADMIN — Medication 1 APPLICATION(S): at 05:28

## 2024-12-10 RX ADMIN — MEMANTINE HYDROCHLORIDE 10 MILLIGRAM(S): 14 CAPSULE, EXTENDED RELEASE ORAL at 05:47

## 2024-12-10 RX ADMIN — TRIAMCINOLONE ACETONIDE 1 APPLICATION(S): 0.15 AEROSOL, SPRAY TOPICAL at 05:26

## 2024-12-10 RX ADMIN — AMMONIUM LACTATE 1 APPLICATION(S): 120 LOTION TOPICAL at 05:26

## 2024-12-10 RX ADMIN — PREDNISONE 10 MILLIGRAM(S): 20 TABLET ORAL at 05:28

## 2024-12-10 RX ADMIN — Medication 50 MICROGRAM(S): at 05:27

## 2024-12-10 NOTE — CONSULT NOTE ADULT - SUBJECTIVE AND OBJECTIVE BOX
Date of Service12-10-24 @ 15:26    HPI:  82 yo F PMHx hypothyroid, dementia, melanoma immunotherapy with nivolumab and Ipilimumab administered on November 26, 2024 subsequently sen at [place] for rash on 11/30 (treated for presumed allergic reaction with steroid and benadryl) p/w worsening of rash with blisters with oral involvement to [facility] and transferred to [facility] for further eval. Pt was seen by outside dermatologist Dr. Husain ([phone]) 1 day prior to arrival and had a Bx at that time and was told she had TEN. [place] given  s/p solumedrol 250mg. In ED pt was remotely seen by dermatology.  (05 Dec 2024 09:11)    PERTINENT PM/SXH:   Hypothyroid  Dementia  Melanoma  Hypertension  No significant past surgical history    FAMILY HISTORY:    ITEMS NOT CHECKED ARE NOT PRESENT    SOCIAL HISTORY:   Significant other/partner[ ]  Children[ ]  Anglican/Spirituality:  Substance hx:  [ ]   Tobacco hx:  [ ]   Alcohol hx: [ ]   Home Opioid hx:  [ ] I-Stop Reference No:  Living Situation: [ ]Home  [ ]Long term care  [ ]Rehab [x ]Other Independent Living at [facility]  Does the patient live alone: [x ]yes [ ]no  Any services (e.g. HHA, home care) [x ]yes [ ]no   What is the social support: Brother Leonard     BASELINE (I)ADL(s) (prior to admission):  Love: [x ]Total  [ ] Moderate [ ]Dependent    How do you plan to get back and forth for appointments: Brother Leonard accompanies her     ADVANCE DIRECTIVES:    DNR/MOLST  [ ]  Living Will  [ ]     DECISION MAKER(s):  [ ] Health Care Proxy(s)  [x ] Surrogate(s)  [ ] Guardian           Name(s): Phone Number(s):  Brother Leonard Labadie :  [phone]      Allergies    Omnipaque 350 (Anaphylaxis)  penicillin (Unknown)    Intolerances    MEDICATIONS  (STANDING):  amLODIPine   Tablet 5 milliGRAM(s) Oral daily  ammonium lactate 12% Lotion 1 Application(s) Topical two times a day  atorvastatin 10 milliGRAM(s) Oral at bedtime  clobetasol 0.05% Ointment 1 Application(s) Topical two times a day  enoxaparin Injectable 40 milliGRAM(s) SubCutaneous every 24 hours  FIRST- Mouthwash  BLM 10 milliLiter(s) Swish and Spit four times a day  levothyroxine 50 MICROGram(s) Oral daily  memantine 10 milliGRAM(s) Oral two times a day  mupirocin 2% Ointment 1 Application(s) Topical two times a day  predniSONE   Tablet 10 milliGRAM(s) Oral daily  sertraline 50 milliGRAM(s) Oral daily  triamcinolone 0.1% Ointment 1 Application(s) Topical two times a day    MEDICATIONS  (PRN):  acetaminophen     Tablet .. 650 milliGRAM(s) Oral every 6 hours PRN Temp greater or equal to 38C (100.4F), Mild Pain (1 - 3)  aluminum hydroxide/magnesium hydroxide/simethicone Suspension 30 milliLiter(s) Oral every 4 hours PRN Dyspepsia  melatonin 3 milliGRAM(s) Oral at bedtime PRN Insomnia  ondansetron Injectable 4 milliGRAM(s) IV Push every 8 hours PRN Nausea and/or Vomiting    PRESENT SYMPTOMS: [ ]Unable to self-report  [ ] CPOT [ ] PAINADs [ ] RDOS  Source if other than patient:  [ ]Family   [ ]Team     Pain: [ ]yes [ x]no  QOL impact -   Location -                    Aggravating factors -  Quality -  Radiation -  Timing-  Severity (0-10 scale):  Minimal acceptable level/pain goal (0-10 scale):     CPOT:    https://www.sccm.org/getattachment/upa43y66-1b6r-4x7n-6l8u-6864r3281z2p/Critical-Care-Pain-Observation-Tool-(CPOT)    PAIN AD Score:   [email or web address].pdf (press ctrl +  left click to view)    Dyspnea:                           [ ]Mild [ ]Moderate [ ]Severe    RDOS:  0 to 2  minimal or no respiratory distress   3  mild distress  4 to 6 moderate distress  >7 severe distress  https://homecareinformation.net/handouts/hen/Respiratory_Distress_Observation_Scale.pdf (Ctrl +  left click to view)     Anxiety:                             [ ]Mild [ ]Moderate [ ]Severe  Fatigue:                             [ ]Mild [ ]Moderate [ ]Severe  Nausea:                             [ ]Mild [ ]Moderate [ ]Severe  Loss of appetite:              [ ]Mild [ ]Moderate [ ]Severe  Constipation:                    [ ]Mild [ ]Moderate [ ]Severe    PCSSQ[Palliative Care Spiritual Screening Question]   Severity (0-10):   Score of 4 or > indicate consideration of Chaplaincy referral.  Chaplaincy Referral: [ ] yes [ ] refused [ ] following [ x] Deferred     Caregiver [place]? : [ ] yes [ ] no [ x] Deferred [ ] Declined             Social work referral [ ] Patient & Family Centered Care Referral [ ]     Anticipatory Grief present?:  [ ] yes [ ] no  [x ] Deferred                  Social work referral [ ] Chaplaincy Referral[ ]      Other Symptoms:  [ x]All other review of systems negative       PHYSICAL EXAM:  Vital Signs Last 24 Hrs  T(C): 36.8 (10 Dec 2024 11:13), Max: 36.8 (10 Dec 2024 05:48)  T(F): 98.2 (10 Dec 2024 11:13), Max: 98.3 (10 Dec 2024 05:48)  HR: 74 (10 Dec 2024 11:13) (73 - 75)  BP: 126/68 (10 Dec 2024 11:13) (126/68 - 158/69)  BP(mean): --  RR: 18 (10 Dec 2024 11:13) (18 - 19)  SpO2: 99% (10 Dec 2024 11:13) (99% - 99%)    Parameters below as of 10 Dec 2024 11:13  Patient On (Oxygen Delivery Method): room air     I&O's Summary    GENERAL: [ ]Cachexia    [ x]Alert  [ x]Oriented x  2-3 [ ]Lethargic  [ ]Unarousable  [ x]Verbal  [ ]Non-Verbal  Behavioral:   [ ] Anxiety  [ ] Delirium [ ] Agitation [ x] Other- calm  HEENT:  [ x]Normal   [ ]Dry mouth   [ ]ET Tube/Trach  [ ]Oral lesions  PULMONARY: normal respiratory effort, no accessory muscle use   [ x]Clear [ ]Tachypnea  [ ]Audible excessive secretions   [ ]Rhonchi        [ ]Right [ ]Left [ ]Bilateral  [ ]Crackles        [ ]Right [ ]Left [ ]Bilateral  [ ]Wheezing     [ ]Right [ ]Left [ ]Bilateral  [ ]Diminished breath sounds [ ]right [ ]left [ ]bilateral  CARDIOVASCULAR:    [x ]Regular [ ]Irregular [ ]Tachy  [ ]Jere [ ]Murmur [ ]Other  GASTROINTESTINAL:  [x ]Soft  [ ]Distended   [ ]+BS  [ x]Non tender [ ]Tender  [ ]Other [ ]PEG [ ]OGT/ NGT  Last BM:  GENITOURINARY:  [ x]Normal [ ] Incontinent   [ ]Oliguria/Anuria   [ ]Gibbs  MUSCULOSKELETAL:   [ x]Normal   [ ]Weakness  [ ]Bed/Wheelchair bound [ ]Edema  NEUROLOGIC:   [x ]No focal deficits  [ ]Cognitive impairment  [ ]Dysphagia [ ]Dysarthria [ ]Paresis [ ]Other   SKIN: : erythematous edematous pink scaly plaques and erosions diffusely on trunk and extremities with tense bullae on upper extremities, proximal lower extremities, palms and ventral wrist  [ ]Normal  [ ]Rash  [ ]Other  [ ]Pressure ulcer(s)       Present on admission [ ]y [ ]n    CRITICAL CARE:  [ ] Shock Present  [ ]Septic [ ]Cardiogenic [ ]Neurologic [ ]Hypovolemic  [ ]  Vasopressors [ ]  Inotropes   [ ]Respiratory failure present [ ]Mechanical ventilation [ ]Non-invasive ventilatory support [ ]High flow    [ ]Acute  [ ]Chronic [ ]Hypoxic  [ ]Hypercarbic [ ]Other  [ ]Other organ failure     LABS:                        10.1   17.66 )-----------( 459      ( 09 Dec 2024 05:30 )             34.1   12-09    141  |  106  |  17  ----------------------------<  98  3.7   |  23  |  0.72    Ca    7.9[L]      09 Dec 2024 05:30  Phos  3.3     12-09  Mg     2.10     12-09        Urinalysis Basic - ( 09 Dec 2024 05:30 )    Color: x / Appearance: x / SG: x / pH: x  Gluc: 98 mg/dL / Ketone: x  / Bili: x / Urobili: x   Blood: x / Protein: x / Nitrite: x   Leuk Esterase: x / RBC: x / WBC x   Sq Epi: x / Non Sq Epi: x / Bacteria: x      RADIOLOGY & ADDITIONAL STUDIES: Reviewed    PROTEIN CALORIE MALNUTRITION PRESENT: [ ]mild [ ]moderate [ ]severe [ ]underweight [ ]morbid obesity  https://www.andeal.org/vault/0465/web/files/ONC/Table_Clinical%20Characteristics%20to%20Document%20Malnutrition-White%20JV%20et%20al%289245.pdf    Height (cm): 149 (12-04-24 @ 22:31), 149 (12-04-24 @ 14:34), 149 (11-30-24 @ 11:25)  Weight (kg): 68 (12-04-24 @ 22:31), 58 (12-04-24 @ 14:34), 59 (11-30-24 @ 11:25)  BMI (kg/m2): 30.6 (12-04-24 @ 22:31), 26.1 (12-04-24 @ 14:34), 26.6 (11-30-24 @ 11:25)    [ ]PPSV2 < or = to 30% [ ]significant weight loss  [ ]poor nutritional intake  [ ]anasarca[ ]Artificial Nutrition      Other REFERRALS:  [ ]Hospice  [ ]Child Life  [ ]Social Work  [x ]Case management [ ]Holistic Therapy

## 2024-12-10 NOTE — DISCHARGE NOTE NURSING/CASE MANAGEMENT/SOCIAL WORK - NSDCPEFALRISK_GEN_ALL_CORE
For information on Fall & Injury Prevention, visit: [email or web address] OR  [email or web address] OR  https://www.cdc.gov/steadi/patient.html

## 2024-12-10 NOTE — CONSULT NOTE ADULT - ASSESSMENT
82 yo F w/ hx dementia, melanoma on nivolumab and Ipilimumab p/w rash  Patient evaluated for Geriatric Oncology Program.     Mentation - B-CAM Score: Delirum negative     Mobility -   AM-PAC Functional Assessment: Basic Mobility Score -24  AM-PAC Functional Assessment: Daily Activity Score -24     Medication - No high risk medications identified     What matters most - Continue DMT.

## 2024-12-10 NOTE — DISCHARGE NOTE NURSING/CASE MANAGEMENT/SOCIAL WORK - FINANCIAL ASSISTANCE
[facility] provides services at a reduced cost to those who are determined to be eligible through [facility]’s financial assistance program. Information regarding [facility]’s financial assistance program can be found by going to [email or web address] or by calling [phone].

## 2024-12-10 NOTE — DISCHARGE NOTE NURSING/CASE MANAGEMENT/SOCIAL WORK - PATIENT PORTAL LINK FT
You can access the FollowMyHealth Patient Portal offered by [facility] by registering at the following website: [email or web address]. By joining [employer]’s FollowMyHealth portal, you will also be able to view your health information using other applications (apps) compatible with our system.

## 2024-12-10 NOTE — CONSULT NOTE ADULT - PROBLEM SELECTOR RECOMMENDATION 4
Counseled/educated brother Leonard on importance of considering advanced directives for code status preferences in setting of malignancy and dementia. Encouraged him to review patient's living will when at home for guidance on establishing advanced directives for patient  They plan to follow up with oncology for further DMT  See GOC note  16 minutes spent on advanced care planning/ goals of care

## 2024-12-10 NOTE — GOALS OF CARE CONVERSATION - ADVANCED CARE PLANNING - CONVERSATION DETAILS
Patient AAOx2-3. Patient is , whom she states had cancer before he passed away and does not have children.   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   Inquired if she had completed HCP paperwork, but patient states she could not recall. She states her brother Stan and Leonard who she describes as a "friend" who is like a brother and grew up with her help her.   Patient unable to list her medical conditions and denies having cancer.     Met with Leonard, a retired , who shares he is her adopted brother and has been helping to care for patient as patient's brother Stan is elderly in his 90s. Leonard shares that patient was noted to have short term memory loss a few years ago and was noted to have difficulty managing finances leading for him to help patient. Leonard states he is patient's appointed HCP and POA. He has been helping patient coordinate her care. He states patient currently resides in Independent Living but does recognize that patient may need to move to [place] section of the facility. Currently patient is independent with ambulation and has a caregiver Tiffany who helps patient a few times a week with ADLs such as cues/reminders to use soap/shampoo for bathing  Patient was only recently diagnosed with melanoma and recently received first cycle of DMT. Leonard was unsure of staging and prognosis of cancer; encouraged Leonard to speak with oncology team outpatient regarding staging of cancer and prognosis to better understand disease process. He states that per his discussion with medical team, it is less likely her DMT caused her skin lesion but will speak to oncology team further regarding oncology plan moving forward.   Leonard confirms patient had completed a living will. Counseled/educated brother Leonard on importance of considering advanced directives for code status preferences in setting of malignancy and dementia. Encouraged him to review patient's living will when at home for guidance on establishing advanced directives for patient. Leonard recognizes importance of establishing advanced directives and will further consider.

## 2024-12-10 NOTE — CONSULT NOTE ADULT - PROBLEM SELECTOR RECOMMENDATION 2
- Patient AAOx2-3, able to ambulate, needs cues/reminders for ADLs such as using soap/shampoo when showering etc .   - Encouraged brother Leonard to consider assisting patient with medication administration due to patient's cognitive impairment  - supportive care

## 2024-12-10 NOTE — CONSULT NOTE ADULT - PROBLEM SELECTOR RECOMMENDATION 5
Case reviewed with primary team  Thank you for allowing us to participate in your patient's care. Please page [phone] for any questions/concerns.

## 2024-12-10 NOTE — CONSULT NOTE ADULT - PROBLEM SELECTOR RECOMMENDATION 9
- Patient malignant melanoma s/p ipi/nivo x1 on 11/26/24  Per Oncology note : "- 10/08/24: PET/CT showed intensely FDG avid left lower lobe lung nodule, intense FDG activity corresponding with thickening of the right nasal wall, intensely FDG avid bilateral cervical chain and submandibular lymph nodes. Focal FDG activity in the left hilar region. Also seen was intense FDG activity corresponding with a segment of sigmoid colon which is of unknown significance. Malignancy cannot definitively be excluded. Colonoscopic evaluation may be useful if clinically indicated.There is nonspecific thickening along the inferior right orbit. MRI correlation may be useful if clinically indicated."   - Follows with Dr. Odom   - Oncology recommendations appreciated

## 2024-12-11 ENCOUNTER — NON-APPOINTMENT (OUTPATIENT)
Age: 84
End: 2024-12-11

## 2024-12-11 PROBLEM — L12.0 BULLOUS PEMPHIGOID: Status: ACTIVE | Noted: 2024-12-11

## 2024-12-11 RX ORDER — CLOBETASOL PROPIONATE 0.5 MG/G
0.05 OINTMENT TOPICAL
Qty: 1 | Refills: 3 | Status: ACTIVE | COMMUNITY
Start: 2024-12-11 | End: 1900-01-01

## 2024-12-11 RX ORDER — MUPIROCIN 20 MG/G
2 OINTMENT TOPICAL
Qty: 1 | Refills: 2 | Status: ACTIVE | COMMUNITY
Start: 2024-12-11 | End: 1900-01-01

## 2024-12-11 RX ORDER — TRIAMCINOLONE ACETONIDE 1 MG/G
0.1 OINTMENT TOPICAL
Qty: 1 | Refills: 0 | Status: ACTIVE | COMMUNITY
Start: 2024-12-11 | End: 1900-01-01

## 2024-12-12 ENCOUNTER — EMERGENCY (EMERGENCY)
Facility: HOSPITAL | Age: 84
LOS: 1 days | Discharge: HOME | End: 2024-12-12
Attending: STUDENT IN AN ORGANIZED HEALTH CARE EDUCATION/TRAINING PROGRAM
Payer: MEDICARE

## 2024-12-12 VITALS
TEMPERATURE: 98 F | RESPIRATION RATE: 18 BRPM | WEIGHT: 130.07 LBS | DIASTOLIC BLOOD PRESSURE: 50 MMHG | HEIGHT: 60 IN | HEART RATE: 58 BPM | OXYGEN SATURATION: 100 % | SYSTOLIC BLOOD PRESSURE: 151 MMHG

## 2024-12-12 PROCEDURE — 99282 EMERGENCY DEPT VISIT SF MDM: CPT

## 2024-12-12 PROCEDURE — 99283 EMERGENCY DEPT VISIT LOW MDM: CPT

## 2024-12-12 NOTE — ED PROVIDER NOTE - PHYSICAL EXAMINATION
Gen: well appearing, in moderate distress scratching/rubbing her skin  Head: normocephalic, atraumatic  EENT: EOMI, moist mucous membranes. (+)ulcerations in b/l buccal mucosa  Lung: no increased work of breathing, clear to auscultation bilaterally, no wheezing, rales, rhonchi, speaking in full sentences  CV: regular rate, regular rhythm  Abd: soft, non-tender, non-distended  MSK: No edema, no visible deformities, full range of motion in all 4 extremities  Neuro: Awake, alert, clear speech.  Skin: (+)  Diffuse pruritic erythematous macules and papules with intermittent ruptured blisters/scabbing most prominent on the abdomen, b/l upper extremities, and proximal lower extremities.

## 2024-12-12 NOTE — ED ADULT TRIAGE NOTE - CHIEF COMPLAINT QUOTE
pt from [facility] pt has hx dementia A&Ox2- oriented to name and place, pt c/o throat pain and difficulty swallowing and whole body rash, resp even and unlabored,  as per EMS pt pressed her life alert bottom and was stating her was having an allergic reaction EMS gave 50mg IM Benadryl and when pt family came to nursing home reported pt has hx skin CA and an autoimmune disease that causes her to have a skin rash at baseline

## 2024-12-12 NOTE — ED ADULT NURSE NOTE - OBJECTIVE STATEMENT
Pt A&Ox4, NAD. Pt presents to the ED with complaints of rash to body. Hx of dementia, poor historian. Daughter at bedside. Rash is not new, pt already on meds for  rash but pt does not remember anything. Breathing even and unlabored.

## 2024-12-12 NOTE — ED PROVIDER NOTE - NSFOLLOWUPINSTRUCTIONS_ED_ALL_ED_FT
- Continue your prescribed treatments with Dr. Quintana  - If Regina tolerates, she may try Benadryl 25mg in the daytime for itchiness. Keep her sores covered to prevent itching. You may also try ice packs to help with the itchiness.    Good luck, we hope you feel better!

## 2024-12-12 NOTE — ED ADULT NURSE NOTE - NSFALLRISKINTERV_ED_ALL_ED
Assistance OOB with selected safe patient handling equipment if applicable/Assistance with ambulation/Communicate fall risk and risk factors to all staff, patient, and family/Monitor gait and stability/Monitor for mental status changes and reorient to person, place, and time, as needed/Provide visual cue: yellow wristband, yellow gown, etc/Reinforce activity limits and safety measures with patient and family/Toileting schedule using arm’s reach rule for commode and bathroom/Use of alarms - bed, stretcher, chair and/or video monitoring/Call bell, personal items and telephone in reach/Instruct patient to call for assistance before getting out of bed/chair/stretcher/Non-slip footwear applied when patient is off stretcher/[place] to call system/Physically safe environment - no spills, clutter or unnecessary equipment/Purposeful Proactive Rounding/Room/bathroom lighting operational, light cord in reach

## 2024-12-12 NOTE — ED PROVIDER NOTE - OBJECTIVE STATEMENT
83 year old female with PMHx hypothyroid, dementia, melanoma immunotherapy with nivolumab and Ipilimumab administered on November 26, 2024 subsequently developed rash, ultimately determined to be most likely bullous pemphigoid, currently under treatment of Dr. Jose Enrique Quintana (dermatology), treated with Triamciniclone ointment, Clobetasol ointment, and mupirocin, along with daily prednisone 10mg and bendaryl, BIBA for this rash. Per daughter at bedside, patient lives at [facility], daughter visits twice a day, and while daughter was out picking up patient's ointments, patient pushed her life alert button and EMS treated patient with Benadryl 50mg IM for presumed allergic reaction. Per daughter patient's rash has actually been improving and they are in the process of obtaining insurance approval for injection therapy for her rash. States patient has dementia and forgets that she has already been evaluated for this rash and that it is not new. [facility] patient should not have been brought to the ED however because EMS gave Benadryl they had to bring her to the ED. No other acute complaints.

## 2024-12-12 NOTE — ED PROVIDER NOTE - PATIENT PORTAL LINK FT
You can access the FollowMyHealth Patient Portal offered by [facility] by registering at the following website: [email or web address]. By joining [employer]’s FollowMyHealth portal, you will also be able to view your health information using other applications (apps) compatible with our system.

## 2024-12-12 NOTE — ED PROVIDER NOTE - ATTENDING CONTRIBUTION TO CARE
pt with known bullous pemphigoid currently being treated for it.  Pt has dementia and forgot and pushed her life alert. pt given steroids and benadryl by ems. family at bedside now.    physical - rrr. ctab. abd- soft, nt. no edema. diffuse erythematous rash with bullae.    plan - family states patient already being treated. no acute changes. will dc back to [facility].

## 2024-12-12 NOTE — ED PROVIDER NOTE - CLINICAL SUMMARY MEDICAL DECISION MAKING FREE TEXT BOX
83 year old female with likely bullous pemphigoid diagnosed by derm and already in process of treatment by Dr. Mariano BLEVINS after patient pressed her life alert for itchiness, received Benadryl 50mg IM for presumed allergic reaction. Daughter states patient has dementia and forgets she is undergoing treatment and constantly thinks her rash is new. Patient has appropriate planning and follow up for her skin condition. She appears very itchy, otherwise well appearing, nontoxic. Advised daughter to keep patient's skin covered, try ice packs, and benadryl in day time for itchiness if patient tolerates. Stable for dc.

## 2024-12-13 ENCOUNTER — APPOINTMENT (OUTPATIENT)
Age: 84
End: 2024-12-13

## 2024-12-17 ENCOUNTER — APPOINTMENT (OUTPATIENT)
Dept: DERMATOLOGY | Facility: CLINIC | Age: 84
End: 2024-12-17
Payer: MEDICARE

## 2024-12-17 ENCOUNTER — APPOINTMENT (OUTPATIENT)
Age: 84
End: 2024-12-17

## 2024-12-17 VITALS — BODY MASS INDEX: 24.19 KG/M2 | HEIGHT: 59 IN | WEIGHT: 120 LBS

## 2024-12-17 DIAGNOSIS — L12.0 BULLOUS PEMPHIGOID: ICD-10-CM

## 2024-12-17 DIAGNOSIS — Z71.89 OTHER SPECIFIED COUNSELING: ICD-10-CM

## 2024-12-17 DIAGNOSIS — C30.0 MALIGNANT NEOPLASM OF NASAL CAVITY: ICD-10-CM

## 2024-12-17 PROCEDURE — G2211 COMPLEX E/M VISIT ADD ON: CPT

## 2024-12-17 PROCEDURE — 99215 OFFICE O/P EST HI 40 MIN: CPT

## 2024-12-17 RX ORDER — PREDNISONE 20 MG/1
20 TABLET ORAL
Qty: 50 | Refills: 0 | Status: ACTIVE | COMMUNITY
Start: 2024-12-17 | End: 1900-01-01

## 2024-12-17 RX ORDER — DEXAMETHASONE 0.5 MG/5ML
0.5 SOLUTION ORAL
Qty: 1 | Refills: 3 | Status: ACTIVE | COMMUNITY
Start: 2024-12-17 | End: 1900-01-01

## 2024-12-17 RX ORDER — CLOBETASOL PROPIONATE 0.5 MG/ML
0.05 SOLUTION TOPICAL
Qty: 1 | Refills: 3 | Status: ACTIVE | COMMUNITY
Start: 2024-12-17 | End: 1900-01-01

## 2024-12-23 ENCOUNTER — NON-APPOINTMENT (OUTPATIENT)
Age: 84
End: 2024-12-23

## 2024-12-23 RX ORDER — DUPILUMAB 300 MG/2ML
300 INJECTION, SOLUTION SUBCUTANEOUS
Qty: 2 | Refills: 5 | Status: ACTIVE | COMMUNITY
Start: 2024-12-17

## 2024-12-24 ENCOUNTER — NON-APPOINTMENT (OUTPATIENT)
Age: 84
End: 2024-12-24

## 2024-12-30 ENCOUNTER — APPOINTMENT (OUTPATIENT)
Dept: DERMATOLOGY | Facility: CLINIC | Age: 84
End: 2024-12-30
Payer: MEDICARE

## 2024-12-30 VITALS — HEIGHT: 59 IN | WEIGHT: 120 LBS | BODY MASS INDEX: 24.19 KG/M2

## 2024-12-30 PROCEDURE — 99215 OFFICE O/P EST HI 40 MIN: CPT

## 2024-12-30 PROCEDURE — G2211 COMPLEX E/M VISIT ADD ON: CPT

## 2024-12-30 RX ORDER — TRIAMCINOLONE ACETONIDE 1 MG/G
0.1 OINTMENT TOPICAL
Qty: 1 | Refills: 0 | Status: ACTIVE | COMMUNITY
Start: 2024-12-30 | End: 1900-01-01

## 2024-12-30 RX ORDER — PREDNISONE 20 MG/1
20 TABLET ORAL
Qty: 40 | Refills: 1 | Status: ACTIVE | COMMUNITY
Start: 2024-12-30 | End: 1900-01-01

## 2024-12-30 RX ORDER — CLOBETASOL PROPIONATE 0.5 MG/G
0.05 GEL TOPICAL
Qty: 1 | Refills: 1 | Status: ACTIVE | COMMUNITY
Start: 2024-12-30 | End: 1900-01-01

## 2025-01-02 ENCOUNTER — OUTPATIENT (OUTPATIENT)
Dept: OUTPATIENT SERVICES | Facility: HOSPITAL | Age: 85
LOS: 1 days | Discharge: HOME | End: 2025-01-02

## 2025-01-02 DIAGNOSIS — C43.9 MALIGNANT MELANOMA OF SKIN, UNSPECIFIED: ICD-10-CM

## 2025-01-06 ENCOUNTER — APPOINTMENT (OUTPATIENT)
Dept: HEMATOLOGY ONCOLOGY | Facility: CLINIC | Age: 85
End: 2025-01-06
Payer: MEDICARE

## 2025-01-06 ENCOUNTER — NON-APPOINTMENT (OUTPATIENT)
Age: 85
End: 2025-01-06

## 2025-01-06 ENCOUNTER — RESULT REVIEW (OUTPATIENT)
Age: 85
End: 2025-01-06

## 2025-01-06 ENCOUNTER — OUTPATIENT (OUTPATIENT)
Dept: OUTPATIENT SERVICES | Facility: HOSPITAL | Age: 85
LOS: 1 days | End: 2025-01-06
Payer: MEDICARE

## 2025-01-06 VITALS
OXYGEN SATURATION: 99 % | HEART RATE: 80 BPM | BODY MASS INDEX: 21.71 KG/M2 | WEIGHT: 107.69 LBS | HEIGHT: 59 IN | TEMPERATURE: 97.4 F | SYSTOLIC BLOOD PRESSURE: 152 MMHG | DIASTOLIC BLOOD PRESSURE: 73 MMHG

## 2025-01-06 DIAGNOSIS — C30.0 MALIGNANT NEOPLASM OF NASAL CAVITY: ICD-10-CM

## 2025-01-06 DIAGNOSIS — C43.9 MALIGNANT MELANOMA OF SKIN, UNSPECIFIED: ICD-10-CM

## 2025-01-06 LAB
BASOPHILS # BLD AUTO: 0 K/UL — SIGNIFICANT CHANGE UP (ref 0–0.2)
BASOPHILS NFR BLD AUTO: 0.2 % — SIGNIFICANT CHANGE UP (ref 0–2)
BLD GP AB SCN SERPL QL: SIGNIFICANT CHANGE UP
EOSINOPHIL # BLD AUTO: 0 K/UL — SIGNIFICANT CHANGE UP (ref 0–0.5)
EOSINOPHIL NFR BLD AUTO: 0.1 % — SIGNIFICANT CHANGE UP (ref 0–6)
HCT VFR BLD CALC: 36.2 % — SIGNIFICANT CHANGE UP (ref 34.5–45)
HGB BLD-MCNC: 10.2 G/DL — LOW (ref 11.5–15.5)
LYMPHOCYTES # BLD AUTO: 13.9 % — SIGNIFICANT CHANGE UP (ref 13–44)
LYMPHOCYTES # BLD AUTO: 2.3 K/UL — SIGNIFICANT CHANGE UP (ref 1–3.3)
MCHC RBC-ENTMCNC: 22.7 PG — LOW (ref 27–34)
MCHC RBC-ENTMCNC: 28.1 G/DL — LOW (ref 32–36)
MCV RBC AUTO: 80.8 FL — SIGNIFICANT CHANGE UP (ref 80–100)
MONOCYTES # BLD AUTO: 0.3 K/UL — SIGNIFICANT CHANGE UP (ref 0–0.9)
MONOCYTES NFR BLD AUTO: 2 % — SIGNIFICANT CHANGE UP (ref 2–14)
NEUTROPHILS # BLD AUTO: 13.6 K/UL — HIGH (ref 1.8–7.4)
NEUTROPHILS NFR BLD AUTO: 83.8 % — HIGH (ref 43–77)
PLATELET # BLD AUTO: 335 K/UL — SIGNIFICANT CHANGE UP (ref 150–400)
RBC # BLD: 4.48 M/UL — SIGNIFICANT CHANGE UP (ref 3.8–5.2)
RBC # FLD: 19.7 % — HIGH (ref 10.3–14.5)
WBC # BLD: 16.3 K/UL — HIGH (ref 3.8–10.5)
WBC # FLD AUTO: 16.3 K/UL — HIGH (ref 3.8–10.5)

## 2025-01-06 PROCEDURE — 86900 BLOOD TYPING SEROLOGIC ABO: CPT

## 2025-01-06 PROCEDURE — 99214 OFFICE O/P EST MOD 30 MIN: CPT

## 2025-01-06 PROCEDURE — 36415 COLL VENOUS BLD VENIPUNCTURE: CPT

## 2025-01-06 PROCEDURE — G2211 COMPLEX E/M VISIT ADD ON: CPT

## 2025-01-06 PROCEDURE — 86850 RBC ANTIBODY SCREEN: CPT

## 2025-01-06 PROCEDURE — 86901 BLOOD TYPING SEROLOGIC RH(D): CPT

## 2025-01-07 ENCOUNTER — APPOINTMENT (OUTPATIENT)
Age: 85
End: 2025-01-07

## 2025-01-13 ENCOUNTER — RESULT REVIEW (OUTPATIENT)
Age: 85
End: 2025-01-13

## 2025-01-13 ENCOUNTER — APPOINTMENT (OUTPATIENT)
Age: 85
End: 2025-01-13

## 2025-01-14 LAB
ALBUMIN SERPL ELPH-MCNC: 3.9 G/DL — SIGNIFICANT CHANGE UP (ref 3.3–5)
ALP SERPL-CCNC: 96 U/L — SIGNIFICANT CHANGE UP (ref 40–120)
ALT FLD-CCNC: 19 U/L — SIGNIFICANT CHANGE UP (ref 10–45)
ANION GAP SERPL CALC-SCNC: 14 MMOL/L — SIGNIFICANT CHANGE UP (ref 5–17)
AST SERPL-CCNC: 23 U/L — SIGNIFICANT CHANGE UP (ref 10–40)
BILIRUB SERPL-MCNC: 0.2 MG/DL — SIGNIFICANT CHANGE UP (ref 0.2–1.2)
BUN SERPL-MCNC: 18 MG/DL — SIGNIFICANT CHANGE UP (ref 7–23)
CALCIUM SERPL-MCNC: 9.4 MG/DL — SIGNIFICANT CHANGE UP (ref 8.4–10.5)
CHLORIDE SERPL-SCNC: 101 MMOL/L — SIGNIFICANT CHANGE UP (ref 96–108)
CO2 SERPL-SCNC: 26 MMOL/L — SIGNIFICANT CHANGE UP (ref 22–31)
CREAT SERPL-MCNC: 0.79 MG/DL — SIGNIFICANT CHANGE UP (ref 0.5–1.3)
EGFR: 73 ML/MIN/1.73M2 — SIGNIFICANT CHANGE UP
EGFR: 73 ML/MIN/1.73M2 — SIGNIFICANT CHANGE UP
GLUCOSE SERPL-MCNC: 73 MG/DL — SIGNIFICANT CHANGE UP (ref 70–99)
POTASSIUM SERPL-MCNC: 4.6 MMOL/L — SIGNIFICANT CHANGE UP (ref 3.5–5.3)
POTASSIUM SERPL-SCNC: 4.6 MMOL/L — SIGNIFICANT CHANGE UP (ref 3.5–5.3)
PROT SERPL-MCNC: 6.9 G/DL — SIGNIFICANT CHANGE UP (ref 6–8.3)
SODIUM SERPL-SCNC: 141 MMOL/L — SIGNIFICANT CHANGE UP (ref 135–145)
TSH SERPL-MCNC: 0.92 UIU/ML — SIGNIFICANT CHANGE UP (ref 0.27–4.2)

## 2025-01-15 DIAGNOSIS — Z51.11 ENCOUNTER FOR ANTINEOPLASTIC CHEMOTHERAPY: ICD-10-CM

## 2025-01-21 ENCOUNTER — APPOINTMENT (OUTPATIENT)
Dept: DERMATOLOGY | Facility: CLINIC | Age: 85
End: 2025-01-21
Payer: MEDICARE

## 2025-01-21 DIAGNOSIS — L12.0 BULLOUS PEMPHIGOID: ICD-10-CM

## 2025-01-21 DIAGNOSIS — L85.3 XEROSIS CUTIS: ICD-10-CM

## 2025-01-21 PROCEDURE — 99214 OFFICE O/P EST MOD 30 MIN: CPT

## 2025-01-21 PROCEDURE — G2211 COMPLEX E/M VISIT ADD ON: CPT

## 2025-01-21 RX ORDER — PREDNISONE 5 MG/1
5 TABLET ORAL
Qty: 28 | Refills: 0 | Status: ACTIVE | COMMUNITY
Start: 2025-01-21 | End: 1900-01-01

## 2025-01-21 RX ORDER — TRIAMCINOLONE ACETONIDE 1 MG/G
0.1 PASTE DENTAL 3 TIMES DAILY
Qty: 1 | Refills: 1 | Status: ACTIVE | COMMUNITY
Start: 2025-01-21 | End: 1900-01-01

## 2025-02-03 ENCOUNTER — APPOINTMENT (OUTPATIENT)
Age: 85
End: 2025-02-03

## 2025-02-03 ENCOUNTER — RESULT REVIEW (OUTPATIENT)
Age: 85
End: 2025-02-03

## 2025-02-03 ENCOUNTER — APPOINTMENT (OUTPATIENT)
Dept: HEMATOLOGY ONCOLOGY | Facility: CLINIC | Age: 85
End: 2025-02-03
Payer: MEDICARE

## 2025-02-03 DIAGNOSIS — C30.0 MALIGNANT NEOPLASM OF NASAL CAVITY: ICD-10-CM

## 2025-02-03 DIAGNOSIS — Z29.89 ENCOUNTER. FOR OTHER SPECIFIED PROPHYLACTIC MEASURES: ICD-10-CM

## 2025-02-03 LAB
HCT VFR BLD CALC: 34 % — LOW (ref 34.5–45)
HGB BLD-MCNC: 10 G/DL — LOW (ref 11.5–15.5)
MCHC RBC-ENTMCNC: 23.4 PG — LOW (ref 27–34)
MCHC RBC-ENTMCNC: 29.5 G/DL — LOW (ref 32–36)
MCV RBC AUTO: 79.3 FL — LOW (ref 80–100)
PLATELET # BLD AUTO: 418 K/UL — HIGH (ref 150–400)
RBC # BLD: 4.29 M/UL — SIGNIFICANT CHANGE UP (ref 3.8–5.2)
RBC # FLD: 19.8 % — HIGH (ref 10.3–14.5)
WBC # BLD: 11.4 K/UL — HIGH (ref 3.8–10.5)
WBC # FLD AUTO: 11.4 K/UL — HIGH (ref 3.8–10.5)

## 2025-02-03 PROCEDURE — 99214 OFFICE O/P EST MOD 30 MIN: CPT

## 2025-02-04 LAB
ALBUMIN SERPL ELPH-MCNC: 3.3 G/DL — SIGNIFICANT CHANGE UP (ref 3.3–5)
ALP SERPL-CCNC: 104 U/L — SIGNIFICANT CHANGE UP (ref 40–120)
ALT FLD-CCNC: 20 U/L — SIGNIFICANT CHANGE UP (ref 10–45)
ANION GAP SERPL CALC-SCNC: 10 MMOL/L — SIGNIFICANT CHANGE UP (ref 5–17)
AST SERPL-CCNC: 34 U/L — SIGNIFICANT CHANGE UP (ref 10–40)
BILIRUB SERPL-MCNC: 0.2 MG/DL — SIGNIFICANT CHANGE UP (ref 0.2–1.2)
BUN SERPL-MCNC: 16 MG/DL — SIGNIFICANT CHANGE UP (ref 7–23)
CALCIUM SERPL-MCNC: 8.5 MG/DL — SIGNIFICANT CHANGE UP (ref 8.4–10.5)
CHLORIDE SERPL-SCNC: 101 MMOL/L — SIGNIFICANT CHANGE UP (ref 96–108)
CO2 SERPL-SCNC: 24 MMOL/L — SIGNIFICANT CHANGE UP (ref 22–31)
CREAT SERPL-MCNC: 0.65 MG/DL — SIGNIFICANT CHANGE UP (ref 0.5–1.3)
EGFR: 86 ML/MIN/1.73M2 — SIGNIFICANT CHANGE UP
EGFR: 86 ML/MIN/1.73M2 — SIGNIFICANT CHANGE UP
GLUCOSE SERPL-MCNC: 91 MG/DL — SIGNIFICANT CHANGE UP (ref 70–99)
POTASSIUM SERPL-MCNC: 5.5 MMOL/L — HIGH (ref 3.5–5.3)
POTASSIUM SERPL-SCNC: 5.5 MMOL/L — HIGH (ref 3.5–5.3)
PROT SERPL-MCNC: 6.3 G/DL — SIGNIFICANT CHANGE UP (ref 6–8.3)
SODIUM SERPL-SCNC: 135 MMOL/L — SIGNIFICANT CHANGE UP (ref 135–145)
TSH SERPL-MCNC: 2.28 UIU/ML — SIGNIFICANT CHANGE UP (ref 0.27–4.2)

## 2025-02-11 ENCOUNTER — APPOINTMENT (OUTPATIENT)
Dept: DERMATOLOGY | Facility: CLINIC | Age: 85
End: 2025-02-11
Payer: MEDICARE

## 2025-02-11 DIAGNOSIS — L12.0 BULLOUS PEMPHIGOID: ICD-10-CM

## 2025-02-11 DIAGNOSIS — L85.3 XEROSIS CUTIS: ICD-10-CM

## 2025-02-11 DIAGNOSIS — L82.1 OTHER SEBORRHEIC KERATOSIS: ICD-10-CM

## 2025-02-11 PROCEDURE — 99214 OFFICE O/P EST MOD 30 MIN: CPT

## 2025-02-11 PROCEDURE — G2211 COMPLEX E/M VISIT ADD ON: CPT

## 2025-02-11 RX ORDER — GABAPENTIN 100 MG/1
100 CAPSULE ORAL
Qty: 1 | Refills: 0 | Status: ACTIVE | COMMUNITY
Start: 2025-02-11 | End: 1900-01-01

## 2025-02-11 RX ORDER — AMMONIUM LACTATE 12 %
12 CREAM (GRAM) TOPICAL
Qty: 1 | Refills: 11 | Status: ACTIVE | COMMUNITY
Start: 2025-02-11 | End: 1900-01-01

## 2025-02-18 ENCOUNTER — APPOINTMENT (OUTPATIENT)
Age: 85
End: 2025-02-18

## 2025-02-19 ENCOUNTER — NON-APPOINTMENT (OUTPATIENT)
Age: 85
End: 2025-02-19

## 2025-02-19 RX ORDER — ONDANSETRON 8 MG/1
8 TABLET, ORALLY DISINTEGRATING ORAL EVERY 8 HOURS
Qty: 90 | Refills: 1 | Status: ACTIVE | COMMUNITY
Start: 2025-02-19 | End: 1900-01-01

## 2025-02-24 ENCOUNTER — APPOINTMENT (OUTPATIENT)
Dept: HEMATOLOGY ONCOLOGY | Facility: CLINIC | Age: 85
End: 2025-02-24
Payer: MEDICARE

## 2025-02-24 ENCOUNTER — RESULT REVIEW (OUTPATIENT)
Age: 85
End: 2025-02-24

## 2025-02-24 ENCOUNTER — APPOINTMENT (OUTPATIENT)
Age: 85
End: 2025-02-24

## 2025-02-24 VITALS
DIASTOLIC BLOOD PRESSURE: 72 MMHG | WEIGHT: 98.54 LBS | HEIGHT: 59 IN | SYSTOLIC BLOOD PRESSURE: 110 MMHG | HEART RATE: 86 BPM | BODY MASS INDEX: 19.87 KG/M2 | OXYGEN SATURATION: 100 %

## 2025-02-24 DIAGNOSIS — C30.0 MALIGNANT NEOPLASM OF NASAL CAVITY: ICD-10-CM

## 2025-02-24 LAB
BASOPHILS # BLD AUTO: 0.05 K/UL — SIGNIFICANT CHANGE UP (ref 0–0.2)
BASOPHILS NFR BLD AUTO: 0.4 % — SIGNIFICANT CHANGE UP (ref 0–2)
EOSINOPHIL # BLD AUTO: 0.04 K/UL — SIGNIFICANT CHANGE UP (ref 0–0.5)
EOSINOPHIL NFR BLD AUTO: 0.3 % — SIGNIFICANT CHANGE UP (ref 0–6)
HCT VFR BLD CALC: 36.5 % — SIGNIFICANT CHANGE UP (ref 34.5–45)
HGB BLD-MCNC: 11.3 G/DL — LOW (ref 11.5–15.5)
IMM GRANULOCYTES # BLD AUTO: 0.09 K/UL — HIGH (ref 0–0.07)
IMM GRANULOCYTES NFR BLD AUTO: 0.6 % — SIGNIFICANT CHANGE UP (ref 0–0.9)
LYMPHOCYTES # BLD AUTO: 3.79 K/UL — HIGH (ref 1–3.3)
LYMPHOCYTES NFR BLD AUTO: 27.2 % — SIGNIFICANT CHANGE UP (ref 13–44)
MCHC RBC-ENTMCNC: 23.5 PG — LOW (ref 27–34)
MCHC RBC-ENTMCNC: 31 G/DL — LOW (ref 32–36)
MCV RBC AUTO: 75.9 FL — LOW (ref 80–100)
MONOCYTES # BLD AUTO: 0.82 K/UL — SIGNIFICANT CHANGE UP (ref 0–0.9)
MONOCYTES NFR BLD AUTO: 5.9 % — SIGNIFICANT CHANGE UP (ref 2–14)
NEUTROPHILS # BLD AUTO: 9.13 K/UL — HIGH (ref 1.8–7.4)
NEUTROPHILS NFR BLD AUTO: 65.6 % — SIGNIFICANT CHANGE UP (ref 43–77)
NRBC # BLD AUTO: 0 K/UL — SIGNIFICANT CHANGE UP (ref 0–0)
NRBC # FLD: 0 K/UL — SIGNIFICANT CHANGE UP (ref 0–0)
NRBC BLD AUTO-RTO: 0 /100 WBCS — SIGNIFICANT CHANGE UP (ref 0–0)
PLATELET # BLD AUTO: 364 K/UL — SIGNIFICANT CHANGE UP (ref 150–400)
PMV BLD: 8.7 FL — SIGNIFICANT CHANGE UP (ref 7–13)
RBC # BLD: 4.81 M/UL — SIGNIFICANT CHANGE UP (ref 3.8–5.2)
RBC # FLD: 19 % — HIGH (ref 10.3–14.5)
WBC # BLD: 13.92 K/UL — HIGH (ref 3.8–10.5)
WBC # FLD AUTO: 13.92 K/UL — HIGH (ref 3.8–10.5)

## 2025-02-24 PROCEDURE — G2211 COMPLEX E/M VISIT ADD ON: CPT

## 2025-02-24 PROCEDURE — 99214 OFFICE O/P EST MOD 30 MIN: CPT

## 2025-02-25 ENCOUNTER — EMERGENCY (EMERGENCY)
Facility: HOSPITAL | Age: 85
LOS: 1 days | Discharge: HOME | End: 2025-02-25
Attending: STUDENT IN AN ORGANIZED HEALTH CARE EDUCATION/TRAINING PROGRAM
Payer: MEDICARE

## 2025-02-25 ENCOUNTER — NON-APPOINTMENT (OUTPATIENT)
Age: 85
End: 2025-02-25

## 2025-02-25 VITALS
SYSTOLIC BLOOD PRESSURE: 112 MMHG | DIASTOLIC BLOOD PRESSURE: 69 MMHG | OXYGEN SATURATION: 98 % | RESPIRATION RATE: 18 BRPM | HEART RATE: 78 BPM | HEIGHT: 59 IN | TEMPERATURE: 98 F

## 2025-02-25 DIAGNOSIS — E87.6 HYPOKALEMIA: ICD-10-CM

## 2025-02-25 DIAGNOSIS — Z29.89 ENCOUNTER. FOR OTHER SPECIFIED PROPHYLACTIC MEASURES: ICD-10-CM

## 2025-02-25 DIAGNOSIS — R79.89 OTHER SPECIFIED ABNORMAL FINDINGS OF BLOOD CHEMISTRY: ICD-10-CM

## 2025-02-25 LAB
ALBUMIN SERPL ELPH-MCNC: 2.2 G/DL — LOW (ref 3.3–5.2)
ALP SERPL-CCNC: 154 U/L — HIGH (ref 40–120)
ALT FLD-CCNC: 24 U/L — SIGNIFICANT CHANGE UP
ANION GAP SERPL CALC-SCNC: 16 MMOL/L — SIGNIFICANT CHANGE UP (ref 5–17)
APPEARANCE UR: CLEAR — SIGNIFICANT CHANGE UP
AST SERPL-CCNC: 49 U/L — HIGH
BASOPHILS # BLD AUTO: 0.05 K/UL — SIGNIFICANT CHANGE UP (ref 0–0.2)
BASOPHILS NFR BLD AUTO: 0.4 % — SIGNIFICANT CHANGE UP (ref 0–2)
BILIRUB SERPL-MCNC: 0.2 MG/DL — LOW (ref 0.4–2)
BILIRUB UR-MCNC: NEGATIVE — SIGNIFICANT CHANGE UP
BUN SERPL-MCNC: 39.9 MG/DL — HIGH (ref 8–20)
CALCIUM SERPL-MCNC: 8.1 MG/DL — LOW (ref 8.4–10.5)
CHLORIDE SERPL-SCNC: 97 MMOL/L — SIGNIFICANT CHANGE UP (ref 96–108)
CO2 SERPL-SCNC: 16 MMOL/L — LOW (ref 22–29)
COLOR SPEC: YELLOW — SIGNIFICANT CHANGE UP
CREAT SERPL-MCNC: 1.3 MG/DL — SIGNIFICANT CHANGE UP (ref 0.5–1.3)
DIFF PNL FLD: NEGATIVE — SIGNIFICANT CHANGE UP
EGFR: 40 ML/MIN/1.73M2 — LOW
EOSINOPHIL # BLD AUTO: 0.02 K/UL — SIGNIFICANT CHANGE UP (ref 0–0.5)
EOSINOPHIL NFR BLD AUTO: 0.1 % — SIGNIFICANT CHANGE UP (ref 0–6)
FLUAV AG NPH QL: SIGNIFICANT CHANGE UP
FLUBV AG NPH QL: SIGNIFICANT CHANGE UP
GLUCOSE SERPL-MCNC: 94 MG/DL — SIGNIFICANT CHANGE UP (ref 70–99)
GLUCOSE UR QL: NEGATIVE MG/DL — SIGNIFICANT CHANGE UP
HCT VFR BLD CALC: 35.2 % — SIGNIFICANT CHANGE UP (ref 34.5–45)
HGB BLD-MCNC: 11 G/DL — LOW (ref 11.5–15.5)
IMM GRANULOCYTES # BLD AUTO: 0.07 K/UL — SIGNIFICANT CHANGE UP (ref 0–0.07)
IMM GRANULOCYTES NFR BLD AUTO: 0.5 % — SIGNIFICANT CHANGE UP (ref 0–0.9)
KETONES UR-MCNC: NEGATIVE MG/DL — SIGNIFICANT CHANGE UP
LEUKOCYTE ESTERASE UR-ACNC: NEGATIVE — SIGNIFICANT CHANGE UP
LYMPHOCYTES # BLD AUTO: 3.68 K/UL — HIGH (ref 1–3.3)
LYMPHOCYTES NFR BLD AUTO: 26.7 % — SIGNIFICANT CHANGE UP (ref 13–44)
MCHC RBC-ENTMCNC: 24 PG — LOW (ref 27–34)
MCHC RBC-ENTMCNC: 31.3 G/DL — LOW (ref 32–36)
MCV RBC AUTO: 76.9 FL — LOW (ref 80–100)
MONOCYTES # BLD AUTO: 0.65 K/UL — SIGNIFICANT CHANGE UP (ref 0–0.9)
MONOCYTES NFR BLD AUTO: 4.7 % — SIGNIFICANT CHANGE UP (ref 2–14)
NEUTROPHILS # BLD AUTO: 9.32 K/UL — HIGH (ref 1.8–7.4)
NEUTROPHILS NFR BLD AUTO: 67.6 % — SIGNIFICANT CHANGE UP (ref 43–77)
NITRITE UR-MCNC: NEGATIVE — SIGNIFICANT CHANGE UP
NRBC # BLD AUTO: 0 K/UL — SIGNIFICANT CHANGE UP (ref 0–0)
NRBC # FLD: 0 K/UL — SIGNIFICANT CHANGE UP (ref 0–0)
NRBC BLD AUTO-RTO: 0 /100 WBCS — SIGNIFICANT CHANGE UP (ref 0–0)
PH UR: 6 — SIGNIFICANT CHANGE UP (ref 5–8)
PLATELET # BLD AUTO: 192 K/UL — SIGNIFICANT CHANGE UP (ref 150–400)
PMV BLD: 9.6 FL — SIGNIFICANT CHANGE UP (ref 7–13)
POTASSIUM SERPL-MCNC: 4.4 MMOL/L — SIGNIFICANT CHANGE UP (ref 3.5–5.3)
POTASSIUM SERPL-SCNC: 4.4 MMOL/L — SIGNIFICANT CHANGE UP (ref 3.5–5.3)
PROT SERPL-MCNC: 5.6 G/DL — LOW (ref 6.6–8.7)
PROT UR-MCNC: 30 MG/DL
RBC # BLD: 4.58 M/UL — SIGNIFICANT CHANGE UP (ref 3.8–5.2)
RBC # FLD: 19.4 % — HIGH (ref 10.3–14.5)
RSV RNA NPH QL NAA+NON-PROBE: SIGNIFICANT CHANGE UP
SARS-COV-2 RNA SPEC QL NAA+PROBE: SIGNIFICANT CHANGE UP
SODIUM SERPL-SCNC: 129 MMOL/L — LOW (ref 135–145)
SP GR SPEC: 1.02 — SIGNIFICANT CHANGE UP (ref 1–1.03)
UROBILINOGEN FLD QL: 0.2 MG/DL — SIGNIFICANT CHANGE UP (ref 0.2–1)
WBC # BLD: 13.79 K/UL — HIGH (ref 3.8–10.5)
WBC # FLD AUTO: 13.79 K/UL — HIGH (ref 3.8–10.5)

## 2025-02-25 PROCEDURE — 0241U: CPT

## 2025-02-25 PROCEDURE — 85025 COMPLETE CBC W/AUTO DIFF WBC: CPT

## 2025-02-25 PROCEDURE — 87637 SARSCOV2&INF A&B&RSV AMP PRB: CPT

## 2025-02-25 PROCEDURE — 71045 X-RAY EXAM CHEST 1 VIEW: CPT

## 2025-02-25 PROCEDURE — 87086 URINE CULTURE/COLONY COUNT: CPT

## 2025-02-25 PROCEDURE — 81001 URINALYSIS AUTO W/SCOPE: CPT

## 2025-02-25 PROCEDURE — 99284 EMERGENCY DEPT VISIT MOD MDM: CPT

## 2025-02-25 PROCEDURE — 99283 EMERGENCY DEPT VISIT LOW MDM: CPT | Mod: 25

## 2025-02-25 PROCEDURE — 71045 X-RAY EXAM CHEST 1 VIEW: CPT | Mod: 26

## 2025-02-25 PROCEDURE — 80053 COMPREHEN METABOLIC PANEL: CPT

## 2025-02-25 PROCEDURE — 36415 COLL VENOUS BLD VENIPUNCTURE: CPT

## 2025-02-25 RX ORDER — POTASSIUM CHLORIDE 1500 MG/1
20 TABLET, FILM COATED, EXTENDED RELEASE ORAL DAILY
Qty: 3 | Refills: 0 | Status: ACTIVE | COMMUNITY
Start: 2025-02-25 | End: 1900-01-01

## 2025-02-25 RX ADMIN — Medication 1000 MILLILITER(S): at 20:58

## 2025-02-25 NOTE — ED ADULT TRIAGE NOTE - CHIEF COMPLAINT QUOTE
Pt BIBA from [facility], EMS c/o generalized weakness, pt with Alzheimer's, at baseline mental status, denies complaints, being treated for melanoma

## 2025-02-26 ENCOUNTER — APPOINTMENT (OUTPATIENT)
Dept: NUCLEAR MEDICINE | Facility: CLINIC | Age: 85
End: 2025-02-26

## 2025-02-26 VITALS — OXYGEN SATURATION: 96 % | TEMPERATURE: 97 F | DIASTOLIC BLOOD PRESSURE: 72 MMHG | SYSTOLIC BLOOD PRESSURE: 115 MMHG

## 2025-02-26 LAB
ALBUMIN SERPL ELPH-MCNC: 3 G/DL
ALP BLD-CCNC: 165 U/L
ALT SERPL-CCNC: 25 U/L
ANION GAP SERPL CALC-SCNC: 16 MMOL/L
AST SERPL-CCNC: 30 U/L
BILIRUB SERPL-MCNC: 0.2 MG/DL
BUN SERPL-MCNC: 39 MG/DL
CALCIUM SERPL-MCNC: 8.4 MG/DL
CHLORIDE SERPL-SCNC: 98 MMOL/L
CO2 SERPL-SCNC: 19 MMOL/L
CORTISOL: 24.1 UG/DL
CREAT SERPL-MCNC: 1.65 MG/DL
EGFR: 30 ML/MIN/1.73M2
EPI CELLS # UR: PRESENT
MAGNESIUM SERPL-MCNC: 2.1 MG/DL
POTASSIUM SERPL-SCNC: 3.1 MMOL/L
PROT SERPL-MCNC: 5.7 G/DL
RBC CASTS # UR COMP ASSIST: 0 /HPF — SIGNIFICANT CHANGE UP (ref 0–4)
SODIUM SERPL-SCNC: 134 MMOL/L
TSH SERPL-ACNC: 11.5 UIU/ML
WBC UR QL: 5 /HPF — SIGNIFICANT CHANGE UP (ref 0–5)

## 2025-02-26 NOTE — ED ADULT NURSE NOTE - CHIEF COMPLAINT QUOTE
Pt BIBA from [facility], EMS c/o generalized weakness, pt with Alzheimer's, at baseline mental status, denies complaints, being treated for melanoma

## 2025-02-26 NOTE — ED PROVIDER NOTE - IV ALTEPLASE INCLUSION HIDDEN
Pain injection instructions:     Steroids take about a week to relieve pain.  Initially you may get pain relief from the local anesthetic but this may wear off before the steroid works.    No driving for 24 hrs   Activity as tolerated- gradually increase activities.  Dont lift over 10 lbs for 24 hrs  No heat at injection sites x 2 days  Use ice for mild swelling and for comfort.  May shower today. No baths for two days.      Resume Aspirin, Plavix, or Coumadin the day after the procedure unless otherwise instructed.   If diabetic,monitor your glucose carefully as steroids can increase glucose level    Seek immediate medical help for:   Severe increase in your usual pain or appearance of new pain.  Prolonged or increasing weakness or numbness in the legs or arms.  Fever above 101 ,Drainage,redness,active bleeding, or increased swelling at the injection site.  Headache, shortness of breath, chest pain, or breathing problems.        
show

## 2025-02-26 NOTE — ED ADULT NURSE NOTE - NSFALLHARMRISKINTERV_ED_ALL_ED

## 2025-02-26 NOTE — ED PROVIDER NOTE - OBJECTIVE STATEMENT
Pt is an 85 yo F here for weakness.  Pt sent in from nursing facility for increasing weakness. Pt states that she just generally weak.  no n/v. no diarrhea. no cp. no sob. no cough. no fever/chills.

## 2025-02-26 NOTE — ED ADULT NURSE NOTE - OBJECTIVE STATEMENT
Patient presents to ED BIBA c/o generalized weakness.  Patient has history of alzheimers at baseline.  During assessment patient has no complaints.  Denies fever/chills, n/v/d, c/p, sob. No further complaints at this time.

## 2025-02-26 NOTE — ED ADULT NURSE NOTE - NSFALLCONCLUSION_ED_ALL_ED
Symptoms include bilateral ears blocked since Oct 2022 with viral illness  Previous treatment includes two oral antibiotics, 5 day prednisone, and Mucinex  Reviewed risks hearing change due to serous fluid  Right TM with slight retraction, suspected serous fluid, white residue noted central TM  No visible perforation or otorrhea on the right    Left eac with honey crusted debris noted along the TM, retraction of the TM, and suspected serous fluid  No visible perforation or otorrhea  Audiogram today indicating bilateral mild SNHL, with No CHL  Right tymps type A  left tymp A/B   Good word discrimination      Reviewed options for otitis media in adult including resume nasal steroids, repeat oral steroids, decongestants, vs in office pe tube  Briefly discussed actual procedure of pe tube in office setting  After discussion agreed to:  Fluticasone nasal spray one spray each nostril twice per day    Medrol dose pack for 6 days starting tomorrow    Then after complete that dose pack start Corcidin decongestant daily until next visit    Ear drops to left ear twice per day, prescription to pharmacy    Follow up in 4 to 6 weeks for possible nasal endoscopy and pe tube, evaluation ears with microscope Fall with Harm Risk

## 2025-02-26 NOTE — ED PROVIDER NOTE - PATIENT PORTAL LINK FT
You can access the FollowMyHealth Patient Portal offered by [facility] by registering at the following website: [email or web address]. By joining [employer]’s FollowMyHealth portal, you will also be able to view your health information using other applications (apps) compatible with our system.

## 2025-02-26 NOTE — ED ADULT NURSE NOTE - NS ED NURSE LEVEL OF CONSCIOUSNESS AFFECT
ACUTE REHAB TEAM CONFERENCE SUMMARY   [facility]    NAME: Jose M Bucio  : 1949 ADMIT DATE: 3/15/2021    Rehab Admitting Dx:Critical illness myopathy [G72.81]  Critical illness myopathy [G72.81]  Patient Comorbid Conditions: Active Hospital Problems    Diagnosis Date Noted    Generalized weakness [R53.1]     Status post coronary artery bypass graft [Z95.1]     Pneumonia due to COVID-19 virus [U07.1, J12.82]     Moderate malnutrition (HCC) [E44.0]     Critical illness myopathy [G72.81] 03/15/2021    Recurrent right pleural effusion [J90] 2021    Chronic kidney disease, stage 3b [N18.32] 2016    Hospital-acquired pneumonia [J18.9, Y95] 2012     Date: 3/24/2021    CASE MANAGEMENT  Current issues/needs regarding patient and family discharge status: ***    PHYSICAL THERAPY (Updated in QI)  Short term goals  Time Frame for Short term goals: 10 days STG=LTG  Short term goal 1: pt will perform all bed mobility with mod I  Short term goal 2: pt will perform sit tostand and pivot transfers with mod I, car transfers with CGA for LEs  Short term goal 3: pt will ambulate on level and unlevel surfaces with 2ww with mod I , level surfaces 200' with supervision  Short term goal 4: Pt will ascend/descend 1 step with 2ww with supervision  Short term goal 5: pt will retrieve light item from floor with reacher and 2ww with mod I          Impairments/deficits, barriers:     Body structures, Functions, Activity limitations: Decreased functional mobility , Decreased cognition, Decreased high-level IADLs, Decreased endurance, Decreased ADL status, Decreased ROM, Decreased sensation, Decreased coordination, Decreased strength, Decreased balance, Increased pain     Prognosis: Good  Decision Making: High Complexity  Clinical Presentation: unpredicatable characteristics  Equipment needed at discharge:***      PT IRF-HEENA scores since initial assessment  Bed Mobility:   Sit to Lying  Assistance Needed: Independent  Comment: Indep without bed features or cueing  CARE Score: 6  Discharge Goal: Independent    Roll Left and Right  Assistance Needed: Independent  Comment: Indep without bed features  CARE Score: 6  Discharge Goal: Independent    Lying to Sitting on Side of Bed  Assistance Needed: Supervision or touching assistance  Comment: Supervision with min cues to roll on side before getting up, req extra time and effort to get into sitting position  CARE Score: 4  Discharge Goal: Independent    Transfers:    Sit to Stand  Assistance Needed: Independent  Comment: Mod I with RW, demonstrates safety with sternal precautions  CARE Score: 6  Discharge Goal: Independent    Chair/Bed-to-Chair Transfer  Assistance Needed: Supervision or touching assistance  Comment: Supervision with RW  CARE Score: 4  Discharge Goal: Independent         Car Transfer  Assistance Needed: Supervision or touching assistance  Comment: Supervision for balance with approach with RW, able to lift LEs in/out of car without A  CARE Score: 4  Discharge Goal: Supervision or touching assistance    Ambulation:    Walking Ability  Does the Patient Walk?: Yes     Walk 10 Feet  Assistance Needed: Independent  Comment: Mod I with RW, demonstrates safety  CARE Score: 6  Discharge Goal: Independent     Walk 50 Feet with Two Turns  Assistance Needed: Supervision or touching assistance  Comment: Supervision with RW for balance  CARE Score: 4  Discharge Goal: Supervision or touching assistance     Walk 150 Feet  Assistance Needed: Supervision or touching assistance  Comment: Supervision for balance with RW  CARE Score: 4  Discharge Goal: Supervision or touching assistance     Walking 10 Feet on Uneven Surfaces  Assistance Needed: Supervision or touching assistance  Comment: Mod I using RW over carpet, demonstrates safety with transition over carpet edge.   CARE Score: 4  Discharge Goal: Independent     1 Step (Curb)  Assistance Needed: Supervision or touching assistance  Comment: SB-Supervision for balance with RW  CARE Score: 4  Discharge Goal: Supervision or touching assistance     4 Steps  Assistance Needed: Supervision or touching assistance  Comment: SB-Supervision using B rails in non-recip pattern  Reason if not Attempted: Not applicable  CARE Score: 4  Discharge Goal: Not Applicable     12 Steps  Assistance Needed: Supervision or touching assistance  Comment: SBA using B rails in recip pattern, demonstrates light UE support on rails  Reason if not Attempted: Not applicable  CARE Score: 4  Discharge Goal: Not Applicable    Gait Deviations: []None []Slow leno  [] Increased NETTA  [] Staggers []Deviated Path  [] Decreased step length  [] Decreased step height  []Decreased arm swing  [] Shuffles  [] Decreased head and trunk rotation  []other:        Wheelchair:  w/c Ability: Wheelchair Ability  Uses a Wheelchair and/or Scooter?: No         Wheel 150 Feet  Assistance Needed: Supervision or touching assistance  Comment: SBA propelling wC bkwd with focus on quad strenthening 158' using B LEs only, req extra time to complete distance,  CARE Score: 4              Balance:        Object: Picking Up Object  Assistance Needed: Independent  Comment:  [place] [facility]  Reason if not Attempted: Not attempted due to medical condition or safety concerns  CARE Score: 6  Discharge Goal: Independent    Fall Risk: []  Yes  []  No    OCCUPATIONAL THERAPY  (Updated in QI)  Short term goals  Time Frame for Short term goals: STGs=LTGs :   Long term goals  Time Frame for Long term goals : ~10 days or until d/c  Long term goal 1: Pt will complete grooming tasks Ind  Long term goal 2: Pt will complete total body bathing c S  Long term goal 3: Pt will complete UB dressing Ind  Long term goal 4: Pt will complete LB dressing mod I using AE PRN  Long term goal 5: Pt will doff/don footwear mod I using AE PRN  Long term goals 6: Pt will complete toileting mod I  Long term goal 7: Pt will complete functional transfers (bed, chair, toilet) c DME PRN and mod I; shower transfer c S  Long term goal 8: Pt will perform therex/therax to facilitate increased strength/endurance/ax tolerance (c emphasis on dynamic standing balance/tolerance >8 mins) c SBA  Long term goal 9: Pt will complete simple homemaking tasks c DME PRN and S :                                       OT IRF-HEENA scores and goals since initial assessment:    ADLs:    Eating: Eating  Assistance Needed: Independent  Comment: x  CARE Score: 6  Discharge Goal: Independent       Oral Hygiene: Oral Hygiene  Assistance Needed: Independent  Comment: seated at sink  CARE Score: 6  Discharge Goal: Independent    UB/LB Bathing: Shower/Bathe Self  Assistance Needed: Independent  Comment: mod I to bath UB/LB; Pt able to perform lateral lean seated to bath posterior perineal area  CARE Score: 6  Discharge Goal: Supervision or touching assistance    UB Dressing: Upper Body Dressing  Assistance Needed: Independent  Comment: Pt able to retrieve clothing; mod I to don/doff pull over shirt  CARE Score: 6  Discharge Goal: Independent         LB Dressing: Lower Body Dressing  Assistance Needed: Independent  Comment: mod I to doff/don pants, brief  CARE Score: 6  Discharge Goal: Independent    Donning and [place] Footwear: Putting On/Taking Off Footwear  Assistance Needed: Independent  Comment: mod I to don socks, shoes  CARE Score: 6  Discharge Goal: Independent      Toileting:  Toileting Hygiene  Assistance Needed: Supervision or touching assistance  Comment: Pt able to manage pants c CGA to steady; to complete toilet hygiene c min A to check for throughness  Reason if not Attempted: Not attempted due to medical condition or safety concerns  CARE Score: 4  Discharge Goal: Independent      Toilet Transfers:   *** Toilet Transfer  Assistance Needed: Independent  Comment: mod I using sternal precautions  CARE Score: 6  Discharge Goal: Independent      Impairments/deficits, barriers:  ***  Assessment  Performance deficits / Impairments: Decreased functional mobility , Decreased ADL status, Decreased strength, Decreased cognition, Decreased safe awareness, Decreased endurance, Decreased sensation, Decreased balance, Decreased high-level IADLs, Decreased posture  Decision Making: High Complexity  REQUIRES OT FOLLOW UP: Yes  Equipment needed at discharge:***      COGNITIVE FUNCTION/SPEECH THERAPY (AS INDICATED)  LTG                                                  Ongoing impairments or deficits or barriers: ***  Areas where progress has been made:***  Strategies that improve performance:***    Nursing Current Medical Status:   [] Is continent of bowel and bladder     [] Is incontinent of bowel and bladder    [] Has had an adequate number of bowel movements   [] Urinates with no urinary retention >300ml in bladder   [] Targeting bladder protocol with zaragoza removal   [] Maintaining O2 SATs at 92% or greater   [] Has pain managed while on ARU         [] Has had no skin breakdown while on ARU   [] Has improved skin integrity via wound measurements   [] Has no signs/symptoms of infection at the wound site   [] Pressure wounds Stage/Location:    [] Arrived on unit with pressure wound  [] Has been free from injury during hospitalization   [] Has experienced a fall during hospitalization  [] Ongoing education with patient/family with understanding demonstrated for:  [] Receives IV Fluids  [] Other:        NUTRITION  Weight: 212 lb 1.6 oz (96.2 kg) / Body mass index is 30.43 kg/m². Current diet: Dietary Nutrition Supplements: Frozen Oral Supplement  Dietary Nutrition Supplements: Low Calorie High Protein Supplement  DIET CARB CONTROL; Carb Control: 4 carb choices (60 gms)/meal; Low Sodium (2 GM);  Daily Fluid Restriction: 1500 ml  Dietary Nutrition Supplements: Wound Healing Oral Supplement  Intake: ***      Medical improvements/barriers: ***        Team goals for next treatment period/Intervention for current barriers:   [] Pt will increase activity tolerance for daily tasks. [] Pt will improve bed mobility with reduced assist.  [] Pt will improve safety in fx tasks with reduced cues/assist  [] Pt will improve transfers with reduced assist  [] Pt will improve toileting with reduced assist  [] Pt will improve ADL's with use of adaptive equipment with reduced assist  [] Pt will improve pain mgmt for maximum participation in tx program  [] Pt will improve communication to get basic needs met on unit  [] Pt will improve swallowing for safe diet advancement with use of strategies  []  Plan for discharge to home. Patient Strengths: {Patient Strengths:[id number]}    Justification for Continued Stay  Based on my medical assessment of the patient and review of information from the interdisciplinary team as part of this weekly team conference, the patient continues to meet the following criteria for IRF level of care:   The patient requires active and ongoing intervention of multiple therapy disciplines   The patient requires and intensive rehabilitation therapy program   The patient requires continued physician supervision by a rehabilitation physician   The patient requires 24 hours rehab nursing care   The patient requires an intensive and coordinated interdisciplinary team approach to the delivery of rehabilitative care.      Assessment/Plan   []  The patient is making good progression towards their long term goals and is actively participating in and has a reasonable expectation to continue to benefit from the intensive rehabilitation therapy program   []  The estimated discharge date has been changed from initial team conference due to:   []  The estimated discharge destination has been changed from initial team conference due to:         Ongoing tx following discharge: [][facility]     []OUTPATIENT     [] No Further Treatment     [] Family/Caregiver Training  [] Transitional Living Arrangement   [] Home Assessment (date  )     [] Family Conference   []  Therapeutic Pass       []  Other: (specify)    Estimated Discharge Date: ***    Estimated Discharge Destination: []home alone   []home alone with assist prn  []Continuous supervision []Return home with spouse/family   [] Assisted living  []SNF     Team members participating in today's conference. [x] Yuni Patel, Medical Director  [x] Courtney Andersen,   [x] Rebeca Berger, Nurse Mgr     []  Kalen Mireles, PT   [] Nael Gonzalez, OT   []  Pearl Mendoza, LORI  [] Keegan Singer, MARIANO      [x] Michael Pretty, BRITTA     []  Dayan Rosas RD     [] Sebastian Desouza Mgr   [][address] Mgr   [] Jesse Pereira RN    [] Shayy Good, RN  [] Javi Lopez, RN    [] Chrissy Rivas RN    [] Theo Salazar RN      []     I have led this Team Conference and agree with the plan, Damaris Gardiner MD, 3/24/2021, 9:20 AM  Goals have been updated to reflect recent status.     Team conference note transcribed this date by: *** Appropriate

## 2025-02-26 NOTE — ED PROVIDER NOTE - CLINICAL SUMMARY MEDICAL DECISION MAKING FREE TEXT BOX
labs and XR reviewed. XR neg. mild dehydration on labs. Pt with generalized weakness. Pt reassured. will d/c back to [facility].

## 2025-02-26 NOTE — ED ADULT NURSE NOTE - NS ED NOTE ABUSE RESPONSE YN
Yes [Dear  ___] : Dear  [unfilled], [Courtesy Letter:] : I had the pleasure of seeing your patient, [unfilled], in my office today. [Please see my note below.] : Please see my note below. [Sincerely,] : Sincerely, [FreeTextEntry3] : Rinku Dudley MD\par  of Urology\par [employer] [facility] [employer]\par \par [facility]\par Offices:\par [address] [facility]\par [address] [address]\par [address] [facility], [address]\par \par TEL: [phone]\par FAX: [phone]

## 2025-02-27 LAB
CULTURE RESULTS: SIGNIFICANT CHANGE UP
SPECIMEN SOURCE: SIGNIFICANT CHANGE UP

## 2025-02-28 ENCOUNTER — INPATIENT (INPATIENT)
Facility: HOSPITAL | Age: 85
LOS: 6 days | Discharge: NURSING, REHAB OR LONG-TERM CARE | DRG: 641 | End: 2025-03-07
Attending: HOSPITALIST | Admitting: HOSPITALIST
Payer: MEDICARE

## 2025-02-28 VITALS
WEIGHT: 125 LBS | HEIGHT: 59 IN | TEMPERATURE: 98 F | SYSTOLIC BLOOD PRESSURE: 106 MMHG | RESPIRATION RATE: 16 BRPM | DIASTOLIC BLOOD PRESSURE: 69 MMHG | OXYGEN SATURATION: 98 % | HEART RATE: 84 BPM

## 2025-02-28 DIAGNOSIS — Z88.0 ALLERGY STATUS TO PENICILLIN: ICD-10-CM

## 2025-02-28 DIAGNOSIS — E86.0 DEHYDRATION: ICD-10-CM

## 2025-02-28 DIAGNOSIS — R53.1 WEAKNESS: ICD-10-CM

## 2025-02-28 DIAGNOSIS — Z91.041 RADIOGRAPHIC DYE ALLERGY STATUS: ICD-10-CM

## 2025-02-28 LAB
ALBUMIN SERPL ELPH-MCNC: 2.5 G/DL — LOW (ref 3.3–5.2)
ALP SERPL-CCNC: 178 U/L — HIGH (ref 40–120)
ALT FLD-CCNC: 24 U/L — SIGNIFICANT CHANGE UP
ANION GAP SERPL CALC-SCNC: 14 MMOL/L — SIGNIFICANT CHANGE UP (ref 5–17)
AST SERPL-CCNC: 28 U/L — SIGNIFICANT CHANGE UP
BASOPHILS # BLD AUTO: 0.06 K/UL — SIGNIFICANT CHANGE UP (ref 0–0.2)
BASOPHILS # BLD MANUAL: 0 K/UL — SIGNIFICANT CHANGE UP (ref 0–0.2)
BASOPHILS NFR BLD AUTO: 0.3 % — SIGNIFICANT CHANGE UP (ref 0–2)
BASOPHILS NFR BLD MANUAL: 0 % — SIGNIFICANT CHANGE UP (ref 0–2)
BILIRUB SERPL-MCNC: 0.3 MG/DL — LOW (ref 0.4–2)
BUN SERPL-MCNC: 31.4 MG/DL — HIGH (ref 8–20)
CALCIUM SERPL-MCNC: 8.3 MG/DL — LOW (ref 8.4–10.5)
CHLORIDE SERPL-SCNC: 99 MMOL/L — SIGNIFICANT CHANGE UP (ref 96–108)
CO2 SERPL-SCNC: 21 MMOL/L — LOW (ref 22–29)
CREAT SERPL-MCNC: 0.95 MG/DL — SIGNIFICANT CHANGE UP (ref 0.5–1.3)
EGFR: 59 ML/MIN/1.73M2 — LOW
EGFR: 59 ML/MIN/1.73M2 — LOW
EOSINOPHIL # BLD AUTO: 0.05 K/UL — SIGNIFICANT CHANGE UP (ref 0–0.5)
EOSINOPHIL # BLD MANUAL: 0 K/UL — SIGNIFICANT CHANGE UP (ref 0–0.5)
EOSINOPHIL NFR BLD AUTO: 0.3 % — SIGNIFICANT CHANGE UP (ref 0–6)
EOSINOPHIL NFR BLD MANUAL: 0 % — SIGNIFICANT CHANGE UP (ref 0–6)
GLUCOSE SERPL-MCNC: 86 MG/DL — SIGNIFICANT CHANGE UP (ref 70–99)
HCT VFR BLD CALC: 38.1 % — SIGNIFICANT CHANGE UP (ref 34.5–45)
HGB BLD-MCNC: 11.6 G/DL — SIGNIFICANT CHANGE UP (ref 11.5–15.5)
IMM GRANULOCYTES # BLD AUTO: 0.09 K/UL — HIGH (ref 0–0.07)
IMM GRANULOCYTES NFR BLD AUTO: 0.5 % — SIGNIFICANT CHANGE UP (ref 0–0.9)
LYMPHOCYTES # BLD AUTO: 3.94 K/UL — HIGH (ref 1–3.3)
LYMPHOCYTES # BLD MANUAL: 1.9 K/UL — SIGNIFICANT CHANGE UP (ref 1–3.3)
LYMPHOCYTES NFR BLD AUTO: 21.4 % — SIGNIFICANT CHANGE UP (ref 13–44)
LYMPHOCYTES NFR BLD MANUAL: 10.3 % — LOW (ref 13–44)
MCHC RBC-ENTMCNC: 23.1 PG — LOW (ref 27–34)
MCHC RBC-ENTMCNC: 30.4 G/DL — LOW (ref 32–36)
MCV RBC AUTO: 75.9 FL — LOW (ref 80–100)
MONOCYTES # BLD AUTO: 0.97 K/UL — HIGH (ref 0–0.9)
MONOCYTES # BLD MANUAL: 1.11 K/UL — HIGH (ref 0–0.9)
MONOCYTES NFR BLD AUTO: 5.3 % — SIGNIFICANT CHANGE UP (ref 2–14)
MONOCYTES NFR BLD MANUAL: 6 % — SIGNIFICANT CHANGE UP (ref 2–14)
NEUTROPHILS # BLD AUTO: 13.32 K/UL — HIGH (ref 1.8–7.4)
NEUTROPHILS # BLD MANUAL: 15.43 K/UL — HIGH (ref 1.8–7.4)
NEUTROPHILS NFR BLD AUTO: 72.2 % — SIGNIFICANT CHANGE UP (ref 43–77)
NEUTROPHILS NFR BLD MANUAL: 83.7 % — HIGH (ref 43–77)
NRBC # BLD AUTO: 0 K/UL — SIGNIFICANT CHANGE UP (ref 0–0)
NRBC # FLD: 0 K/UL — SIGNIFICANT CHANGE UP (ref 0–0)
NRBC BLD AUTO-RTO: 0 /100 WBCS — SIGNIFICANT CHANGE UP (ref 0–0)
OVALOCYTES BLD QL SMEAR: ABNORMAL
PLAT MORPH BLD: NORMAL — SIGNIFICANT CHANGE UP
PLATELET # BLD AUTO: 417 K/UL — HIGH (ref 150–400)
PMV BLD: 9.3 FL — SIGNIFICANT CHANGE UP (ref 7–13)
POIKILOCYTOSIS BLD QL AUTO: ABNORMAL
POLYCHROMASIA BLD QL SMEAR: SLIGHT — SIGNIFICANT CHANGE UP
POTASSIUM SERPL-MCNC: 2.6 MMOL/L — CRITICAL LOW (ref 3.5–5.3)
POTASSIUM SERPL-SCNC: 2.6 MMOL/L — CRITICAL LOW (ref 3.5–5.3)
PROT SERPL-MCNC: 5.6 G/DL — LOW (ref 6.6–8.7)
RBC # BLD: 5.02 M/UL — SIGNIFICANT CHANGE UP (ref 3.8–5.2)
RBC # FLD: 18.9 % — HIGH (ref 10.3–14.5)
RBC BLD AUTO: ABNORMAL
SCHISTOCYTES BLD QL AUTO: SLIGHT — SIGNIFICANT CHANGE UP
SMUDGE CELLS # BLD: PRESENT
SODIUM SERPL-SCNC: 134 MMOL/L — LOW (ref 135–145)
TSH SERPL-MCNC: 12.26 UIU/ML — HIGH (ref 0.27–4.2)
WBC # BLD: 18.43 K/UL — HIGH (ref 3.8–10.5)
WBC # FLD AUTO: 18.43 K/UL — HIGH (ref 3.8–10.5)

## 2025-02-28 PROCEDURE — 99223 1ST HOSP IP/OBS HIGH 75: CPT

## 2025-02-28 PROCEDURE — 71045 X-RAY EXAM CHEST 1 VIEW: CPT | Mod: 26

## 2025-02-28 RX ORDER — AMMONIUM LACTATE 12 %
1 LOTION (ML) TOPICAL
Refills: 0 | Status: DISCONTINUED | OUTPATIENT
Start: 2025-02-28 | End: 2025-03-07

## 2025-02-28 RX ORDER — LEVOTHYROXINE SODIUM 300 MCG
50 TABLET ORAL DAILY
Refills: 0 | Status: DISCONTINUED | OUTPATIENT
Start: 2025-02-28 | End: 2025-03-03

## 2025-02-28 RX ORDER — AMLODIPINE BESYLATE 10 MG/1
5 TABLET ORAL DAILY
Refills: 0 | Status: DISCONTINUED | OUTPATIENT
Start: 2025-02-28 | End: 2025-03-07

## 2025-02-28 RX ORDER — SODIUM CHLORIDE 9 G/1000ML
1000 INJECTION, SOLUTION INTRAVENOUS
Refills: 0 | Status: DISCONTINUED | OUTPATIENT
Start: 2025-02-28 | End: 2025-03-04

## 2025-02-28 RX ORDER — MEMANTINE HYDROCHLORIDE 21 MG/1
10 CAPSULE, EXTENDED RELEASE ORAL
Refills: 0 | Status: DISCONTINUED | OUTPATIENT
Start: 2025-02-28 | End: 2025-03-07

## 2025-02-28 RX ORDER — SERTRALINE 100 MG/1
50 TABLET, FILM COATED ORAL DAILY
Refills: 0 | Status: DISCONTINUED | OUTPATIENT
Start: 2025-02-28 | End: 2025-03-07

## 2025-02-28 RX ORDER — ATORVASTATIN CALCIUM 80 MG/1
10 TABLET, FILM COATED ORAL AT BEDTIME
Refills: 0 | Status: DISCONTINUED | OUTPATIENT
Start: 2025-02-28 | End: 2025-03-07

## 2025-02-28 RX ADMIN — Medication 100 MILLIEQUIVALENT(S): at 18:28

## 2025-02-28 RX ADMIN — Medication 1 APPLICATION(S): at 19:52

## 2025-02-28 RX ADMIN — SODIUM CHLORIDE 75 MILLILITER(S): 9 INJECTION, SOLUTION INTRAVENOUS at 21:56

## 2025-02-28 RX ADMIN — Medication 100 MILLIEQUIVALENT(S): at 22:17

## 2025-02-28 RX ADMIN — Medication 40 MILLIEQUIVALENT(S): at 18:28

## 2025-02-28 RX ADMIN — MEMANTINE HYDROCHLORIDE 10 MILLIGRAM(S): 21 CAPSULE, EXTENDED RELEASE ORAL at 18:51

## 2025-02-28 RX ADMIN — ATORVASTATIN CALCIUM 10 MILLIGRAM(S): 80 TABLET, FILM COATED ORAL at 21:56

## 2025-02-28 RX ADMIN — Medication 100 MILLIEQUIVALENT(S): at 20:24

## 2025-02-28 RX ADMIN — Medication 50 MICROGRAM(S): at 18:50

## 2025-02-28 RX ADMIN — AMLODIPINE BESYLATE 5 MILLIGRAM(S): 10 TABLET ORAL at 18:50

## 2025-02-28 RX ADMIN — SERTRALINE 50 MILLIGRAM(S): 100 TABLET, FILM COATED ORAL at 18:51

## 2025-02-28 RX ADMIN — Medication 1000 MILLILITER(S): at 15:53

## 2025-02-28 NOTE — ED CDU PROVIDER INITIAL DAY NOTE - OBJECTIVE STATEMENT
85yo F PMHx of early dementia, HTN, HLD, Thyroid cancer, seeing Dermatology Dr. Mcnair for bullous pemphigoid, competed prednisone taper, on Dupixent every 2 weeks, metastatic melanoma started on Ipi/ Nivo on 11/26/25 x 3 cycles followed by Dr. Odom presents to ED sent in from facility for evaluation of generalized weakness, diarrhea. Pt was seen in ED 2/25 for generalized weakness, discharged after labs, negative flu/covid/rsv swab, negative CXR and UA. Pt now returning for persistent weakness and 2-3 episodes of diarrhea per day. ED labs reviewed, WBC 18.4k, similarly elevated 12/2024. Hypokalemia K 2.6. TSH 12.26, increased from 2.28 on 2/3. Urine culture from 2/25 visit negative, <10k cfu. Pt evaluated by heme/onc Dr. Luana Cannon, recommending overnight monitoring, IV fluids and potassium replacement. Denies fever, vomiting, abdominal pain, chest pain, sob.

## 2025-02-28 NOTE — ED CDU PROVIDER INITIAL DAY NOTE - PHYSICAL EXAMINATION
Gen: No acute distress, non toxic. Very well appearing  HENT: NCAT, Mucous membranes moist  Eyes: pink conjunctivae, EOMI, PERRL  CV: RRR, nl s1/s2.  Resp: CTAB, normal rate and effort  GI: Abdomen soft, NT, ND. No rebound, no guarding  Neuro: A&O x 2 to person and place, sensorimotor intact without deficits   MSK: Full ROM ext x 4  Skin: No rashes. intact and perfused.

## 2025-02-28 NOTE — ED PROVIDER NOTE - ATTENDING CONTRIBUTION TO CARE
Pt sent in from [facility] for weakness. PT was seen two days ago for the same.  brother states that she was groggy when she woke up this morning and he was concerned.  Pt denies any complaints. Pt has had three episodes of diarrhea daily for the past several days. no abd pain.    physical - rrr. ctab. abd - soft, nt. no rebound. no guarding. no edema. no rash.    plan - labs and imaigng reviewed.  will put in obs for hypokalemia. repletion ordered.

## 2025-02-28 NOTE — CONSULT NOTE ADULT - SUBJECTIVE AND OBJECTIVE BOX
REASON FOR CONSULTATION:     HPI:  84 yr old female with PMH of early dementia, HTN, HLD, Thyroid cancer, seeing Dermatology Dr. Mcnair for bullous pemphigoid, competed prednisone taper. Dupixent every 2 weeks metastatic melanoma started on Ipi/ Nivo on 11/26/25 x 3 cycles who was recently in the ED on 11/25/25   Now Brought back in to the er with weakness         REVIEW OF SYSTEMS:  Constitutional, Eyes, ENT, Cardiovascular, Respiratory, Gastrointestinal, Genitourinary, Musculoskeletal, Integumentary, Neurological, Psychiatric, Endocrine, Heme/Lymph, and Allergic/Immunologic review of systems are otherwise negative except as noted in the HPI.    PAST MEDICAL & SURGICAL HISTORY:  Hypothyroid      Dementia      Melanoma      Hypertension      No significant past surgical history          FAMILY HISTORY:      SOCIAL HISTORY:    Allergies    Omnipaque 350 (Anaphylaxis)  penicillin (Unknown)    Intolerances        MEDICATIONS  (STANDING):  sodium chloride 0.9% Bolus 1000 milliLiter(s) IV Bolus once    MEDICATIONS  (PRN):      Vital Signs Last 24 Hrs  T(C): 36.4 (28 Feb 2025 13:55), Max: 36.4 (28 Feb 2025 13:55)  T(F): 97.6 (28 Feb 2025 13:55), Max: 97.6 (28 Feb 2025 13:55)  HR: 84 (28 Feb 2025 13:55) (84 - 84)  BP: 106/69 (28 Feb 2025 13:55) (106/69 - 106/69)  BP(mean): --  RR: 16 (28 Feb 2025 13:55) (16 - 16)  SpO2: 98% (28 Feb 2025 13:55) (98% - 98%)    Parameters below as of 28 Feb 2025 13:55  Patient On (Oxygen Delivery Method): room air        PHYSICAL EXAM:          LABS:                  RADIOLOGY & ADDITIONAL STUDIES:    PATHOLOGY:     REASON FOR CONSULTATION:     HPI:  84 yr old female with PMH of early dementia, HTN, HLD, Thyroid cancer, seeing Dermatology Dr. Mcnair for bullous pemphigoid, competed prednisone taper. Dupixent every 2 weeks metastatic melanoma started on Ipi/ Nivo on 11/26/25 x 3 cycles who was recently in the ED on 11/25/25   Now Brought back in to the er with weakness         REVIEW OF SYSTEMS:  Constitutional, Eyes, ENT, Cardiovascular, Respiratory, Gastrointestinal, Genitourinary, Musculoskeletal, Integumentary, Neurological, Psychiatric, Endocrine, Heme/Lymph, and Allergic/Immunologic review of systems are otherwise negative except as noted in the HPI.    PAST MEDICAL & SURGICAL HISTORY:  Hypothyroid      Dementia      Melanoma      Hypertension      No significant past surgical history          FAMILY HISTORY:      SOCIAL HISTORY:    Allergies    Omnipaque 350 (Anaphylaxis)  penicillin (Unknown)    Intolerances        MEDICATIONS  (STANDING):  sodium chloride 0.9% Bolus 1000 milliLiter(s) IV Bolus once    MEDICATIONS  (PRN):      Vital Signs Last 24 Hrs  T(C): 36.4 (28 Feb 2025 13:55), Max: 36.4 (28 Feb 2025 13:55)  T(F): 97.6 (28 Feb 2025 13:55), Max: 97.6 (28 Feb 2025 13:55)  HR: 84 (28 Feb 2025 13:55) (84 - 84)  BP: 106/69 (28 Feb 2025 13:55) (106/69 - 106/69)  BP(mean): --  RR: 16 (28 Feb 2025 13:55) (16 - 16)  SpO2: 98% (28 Feb 2025 13:55) (98% - 98%)    Parameters below as of 28 Feb 2025 13:55  Patient On (Oxygen Delivery Method): room air        PHYSICAL EXAM:    Awake alert   [id number]  RS B/l BS  aBd SOft non tender  ext; no pedal edema      LABS:                  RADIOLOGY & ADDITIONAL STUDIES:    PATHOLOGY:

## 2025-02-28 NOTE — ED CDU PROVIDER INITIAL DAY NOTE - ATTENDING APP SHARED VISIT CONTRIBUTION OF CARE
Pt evaluated by heme/onc Dr. Luana Cannon, recommending overnight monitoring, IV fluids and potassium replacement. Denies fever, vomiting, abdominal pain, chest pain, sob.  pe awake alert heent ncat neck supple cor s1s2 lung clear abd soft nontender neuro nonfocal dx Pt evaluated by heme/onc Dr. Luana Cannon, recommending overnight monitoring, IV fluids and potassium replacement. Denies fever, vomiting, abdominal pain, chest pain, sob.  pe awake alert heent ncat neck supple cor s1s2 lung clear abd soft nontender neuro nonfocal dx weakness

## 2025-02-28 NOTE — CONSULT NOTE ADULT - ASSESSMENT
84 yr old female with PMH of early dementia, HTN, HLD, Thyroid cancer, seeing Dermatology Dr. Mcnair for bullous pemphigoid, competed prednisone taper. Dupixent every 2 weeks metastatic melanoma started on Ipi/ Nivo on 11/26/25 x 3 cycles who was recently in the ED on 11/25/25   Now Brought back in to the er with weakness    # Melanoma     - s/p 3 cycles of ipi / Nivo poorly tolerating   - Plan per oncologist DR Odom to forgo 4th cycle and proceed with nivo maintainance  - Labs with cbc cmp TSh/ t4   - Plan for outpatient pet scan on 3/4/25    INprogress 84 yr old female with PMH of early dementia, HTN, HLD, Thyroid cancer, seeing Dermatology Dr. Mcnair for bullous pemphigoid, competed prednisone taper. Dupixent every 2 weeks metastatic melanoma started on Ipi/ Nivo on 11/26/25 x 3 cycles who was recently in the ED on 11/25/25   Now Brought back in to the er with weakness    # Melanoma   # Poor functional status/ Poor PO intake     - s/p 3 cycles of ipi / Nivo   - Plan per oncologist DR Odom to forgo 4th cycle and proceed with nivo maintainance  - Labs with cbc cmp TSh/ t4   - IV hydration   -Diarrhea 2-3 x daily if persists consider CT Abd pelvis  - Plan for outpatient pet scan on 3/4/25  - Patient to be monitored in the hospital for a day or so given 2nd visit to the hospital in a few days

## 2025-02-28 NOTE — ED ADULT NURSE NOTE - NSFALLHARMRISKINTERV_ED_ALL_ED

## 2025-02-28 NOTE — ED PROVIDER NOTE - CLINICAL SUMMARY MEDICAL DECISION MAKING FREE TEXT BOX
83 y/o F PMHx of Dementia, HTN, Melanoma presents for weakness. Pt with no other complaints, denies SOB, CP, nausea, vomiting. Physical exam unremarkable, likely continued progressive of neurological decline. Will consult Hem/Onc for indications for admission, to obtain basic labs.

## 2025-02-28 NOTE — ED CDU PROVIDER INITIAL DAY NOTE - CLINICAL SUMMARY MEDICAL DECISION MAKING FREE TEXT BOX
83yo F PMHx of early dementia, HTN, HLD, Thyroid cancer, seeing Dermatology Dr. Mcnair for bullous pemphigoid, competed prednisone taper, on Dupixent every 2 weeks, metastatic melanoma started on Ipi/ Nivo on 11/26/25 x 3 cycles followed by Dr. Odom presents to ED sent in from facility for evaluation of generalized weakness, diarrhea. Pt was seen in ED 2/25 for generalized weakness, discharged after labs, negative flu/covid/rsv swab, negative CXR and UA. Pt now returning for persistent weakness and 2-3 episodes of diarrhea per day. ED labs reviewed, WBC 18.4k, similarly elevated 12/2024. Hypokalemia K 2.6. TSH 12.26, increased from 2.28 on 2/3. Urine culture from 2/25 visit negative, <10k cfu. Pt very well appearing, vitals WNL, afebrile in ED. Pt evaluated by heme/onc Dr. Luana Cannon, recommending overnight monitoring, IV fluids and potassium replacement. Obs for K replacement with K rider x 3, PO potassium. Hydrate with LR @ 75cc/hr. Check GI pcr and c diff if pt able to provide stool sample. Rpt AM labs. UCx negative from 2/25. No cough, SOB or URI symptoms but will rpt CXR due to leukocytosis. Will obtain CT abd/pelvis if diarrhea is persistent.

## 2025-02-28 NOTE — ED PROVIDER NOTE - OBJECTIVE STATEMENT
85 y/o F PMHx of Dementia, HTN, Melanoma presents for weakness. Per brother, has had progressive decline of ADLs over the last few weeks. Was seen two days ago with similar complaint. Per Brother, reached out to Dr. Odom about continued symptoms, states was told to present to the ED for admission for continued Melanoma work up as pt has been too weak to obtain PET scan and Infusion. Pt with no other complaints, denies SOB, CP, nausea, vomiting. 83 y/o F PMHx of Dementia, HTN, HLD, thyroid CA, melanoma presents  for weakness. Per brother, has had progressive decline of ADLs over the last few weeks. Was seen two days ago with similar complaint. Per Brother, reached out to Dr. Odom about continued symptoms, states was told to present to the ED for admission for continued Melanoma work up as pt has been too weak to obtain PET scan and Infusion. Pt with no other complaints, denies SOB, CP, nausea, vomiting.

## 2025-02-28 NOTE — ED ADULT TRIAGE NOTE - CHIEF COMPLAINT QUOTE
general weakness, decreased po intake. sent in by family for eval. ems reports family is requesting help with her care. a and o to self and place (baseline). dementia hx.

## 2025-02-28 NOTE — ED ADULT NURSE NOTE - OBJECTIVE STATEMENT
Pt presents to the ED c/o generalized weakness and poor appetite. Pt is AOx2, PMH of dementia. Breathing is even bilaterally and unlabored. Pt denies chest pain, SOB, N/V/D, blurred vision, headache, or any other complaints. Pt pending blood work and hematology consult.

## 2025-03-01 LAB
ALBUMIN SERPL ELPH-MCNC: 2.4 G/DL — LOW (ref 3.3–5.2)
ALP SERPL-CCNC: 173 U/L — HIGH (ref 40–120)
ALT FLD-CCNC: 24 U/L — SIGNIFICANT CHANGE UP
ANION GAP SERPL CALC-SCNC: 14 MMOL/L — SIGNIFICANT CHANGE UP (ref 5–17)
AST SERPL-CCNC: 30 U/L — SIGNIFICANT CHANGE UP
BILIRUB SERPL-MCNC: 0.3 MG/DL — LOW (ref 0.4–2)
BUN SERPL-MCNC: 30.3 MG/DL — HIGH (ref 8–20)
CALCIUM SERPL-MCNC: 7.5 MG/DL — LOW (ref 8.4–10.5)
CHLORIDE SERPL-SCNC: 104 MMOL/L — SIGNIFICANT CHANGE UP (ref 96–108)
CO2 SERPL-SCNC: 17 MMOL/L — LOW (ref 22–29)
CREAT SERPL-MCNC: 0.83 MG/DL — SIGNIFICANT CHANGE UP (ref 0.5–1.3)
EGFR: 69 ML/MIN/1.73M2 — SIGNIFICANT CHANGE UP
EGFR: 69 ML/MIN/1.73M2 — SIGNIFICANT CHANGE UP
GLUCOSE SERPL-MCNC: 104 MG/DL — HIGH (ref 70–99)
HCT VFR BLD CALC: 35.7 % — SIGNIFICANT CHANGE UP (ref 34.5–45)
HGB BLD-MCNC: 11 G/DL — LOW (ref 11.5–15.5)
MAGNESIUM SERPL-MCNC: 2 MG/DL — SIGNIFICANT CHANGE UP (ref 1.6–2.6)
MCHC RBC-ENTMCNC: 23.8 PG — LOW (ref 27–34)
MCHC RBC-ENTMCNC: 30.8 G/DL — LOW (ref 32–36)
MCV RBC AUTO: 77.1 FL — LOW (ref 80–100)
NRBC # BLD AUTO: 0.02 K/UL — HIGH (ref 0–0)
NRBC # FLD: 0.02 K/UL — HIGH (ref 0–0)
NRBC BLD AUTO-RTO: 0 /100 WBCS — SIGNIFICANT CHANGE UP (ref 0–0)
PLATELET # BLD AUTO: 425 K/UL — HIGH (ref 150–400)
PMV BLD: 8.6 FL — SIGNIFICANT CHANGE UP (ref 7–13)
POTASSIUM SERPL-MCNC: 4.2 MMOL/L — SIGNIFICANT CHANGE UP (ref 3.5–5.3)
POTASSIUM SERPL-SCNC: 4.2 MMOL/L — SIGNIFICANT CHANGE UP (ref 3.5–5.3)
PROT SERPL-MCNC: 5.3 G/DL — LOW (ref 6.6–8.7)
RBC # BLD: 4.63 M/UL — SIGNIFICANT CHANGE UP (ref 3.8–5.2)
RBC # FLD: 19.7 % — HIGH (ref 10.3–14.5)
SODIUM SERPL-SCNC: 135 MMOL/L — SIGNIFICANT CHANGE UP (ref 135–145)
T4 FREE SERPL-MCNC: 0.7 NG/DL — LOW (ref 0.9–1.7)
WBC # BLD: 16.59 K/UL — HIGH (ref 3.8–10.5)
WBC # FLD AUTO: 16.59 K/UL — HIGH (ref 3.8–10.5)

## 2025-03-01 PROCEDURE — 99233 SBSQ HOSP IP/OBS HIGH 50: CPT

## 2025-03-01 RX ADMIN — MEMANTINE HYDROCHLORIDE 10 MILLIGRAM(S): 21 CAPSULE, EXTENDED RELEASE ORAL at 06:13

## 2025-03-01 RX ADMIN — ATORVASTATIN CALCIUM 10 MILLIGRAM(S): 80 TABLET, FILM COATED ORAL at 21:08

## 2025-03-01 RX ADMIN — Medication 1 APPLICATION(S): at 05:34

## 2025-03-01 RX ADMIN — Medication 1 APPLICATION(S): at 21:09

## 2025-03-01 RX ADMIN — Medication 1 APPLICATION(S): at 21:10

## 2025-03-01 RX ADMIN — AMLODIPINE BESYLATE 5 MILLIGRAM(S): 10 TABLET ORAL at 05:34

## 2025-03-01 RX ADMIN — Medication 1 APPLICATION(S): at 05:35

## 2025-03-01 RX ADMIN — Medication 50 MICROGRAM(S): at 05:34

## 2025-03-01 RX ADMIN — SERTRALINE 50 MILLIGRAM(S): 100 TABLET, FILM COATED ORAL at 15:09

## 2025-03-01 RX ADMIN — MEMANTINE HYDROCHLORIDE 10 MILLIGRAM(S): 21 CAPSULE, EXTENDED RELEASE ORAL at 21:07

## 2025-03-01 NOTE — PHYSICAL THERAPY INITIAL EVALUATION ADULT - ADDITIONAL COMMENTS
unable to obtain accurate information due to cognition. per chart review from 12/2024, pt resides at [facility]. ADLs and independent ambulation

## 2025-03-01 NOTE — PHYSICAL THERAPY INITIAL EVALUATION ADULT - IMPAIRMENTS FOUND, PT EVAL
-- DO NOT REPLY / DO NOT REPLY ALL --  -- Message is from the [facility]--    COVID-19 Universal Screening: N/A - Not about scheduling    General Patient Message      Reason for Call: Scott Alcantar's wife would like to have you as her pcp but your panel is closed. Please call    Caller Information       Type Contact Phone    08/07/2020 10:39 AM Phone (Incoming) Lena Alcantar (Self) [phone] (M)          Alternative phone number:     Turnaround time given to caller:   \"This message will be sent to [state Provider's name]. The clinical team will fulfill your request as soon as they review your message.\"     cognitive impairment/gait, locomotion, and balance/gross motor/muscle strength/poor safety awareness

## 2025-03-01 NOTE — CHART NOTE - NSCHARTNOTEFT_GEN_A_CORE
ESTRELLA made aware by PT that recommended d/c plan for pt is [facility]. ESTRELLA contacted pt's brother, Von ([phone]) for d/c planning as pt has dementia diagnosis. As per Von, pt currently resides in the [facility]. Von reports he is in the middle of transitioning pt to the "assisted living section" of the [facility] as he feels that pt needs more care. As per Von, he would like for pt to go to [facility] short term while he arranges her transition to assisted living. Von reports he would like for pt's [facility] referral to be sent out to facilities close to the towns of [place] and [place]. SW aware that pt is pending c diff rule out. ESTRELLA will request MARK from [facility]. Pt has [employer]. Pt will need auth. ESTRELLA will follow up to circulate pt's [facility] referral.

## 2025-03-01 NOTE — PROVIDER CONTACT NOTE (OTHER) - ASSESSMENT
PT eval orders received. Chart reviewed, contents noted. Please see note for full details. Pt tolerated evaluation well, able to ambulate to restroom and void. Pt reports pain 0/10 pre and post session. Pt declines need for pain intervention at this time. Pt left seated in bed after session with all lines intact, CBWR. RN made aware of pt status and participation in PT. PT will continue to follow.     pt verbally educated on PT POC, safe mobility, LE exercises. Reinforcement indicated.    DC rec: [facility]

## 2025-03-01 NOTE — ED CDU PROVIDER SUBSEQUENT DAY NOTE - PROGRESS NOTE DETAILS
as per patient's brother, she lives alone - was found "weak" and lying in bed x 16 hours.  Case discussed with case management

## 2025-03-01 NOTE — PROVIDER CONTACT NOTE (OTHER) - DATE AND TIME:
Patient has been approved for discharge with remote patient monitoring by the Provider.  Out-patient (OP) coordinator informed of patients discharge from the emergency department.  OP coordinator will call the patient at home and has notified the patients provider that the patient is enrolled in remote monitoring.  Patient was informed the OP coordinator will call the patient and was given the OP coordinators phone number. Education was delivered by teach back method and patient was able to demonstrate how to use the scale, blood pressure cuff, pulse oximeter, and connecting the device to the remote tablet to upload readings.  Addressed all questions from the patient.   Tablet numbers [id number]   01-Mar-2025 14:40

## 2025-03-02 ENCOUNTER — OUTPATIENT (OUTPATIENT)
Dept: OUTPATIENT SERVICES | Facility: HOSPITAL | Age: 85
LOS: 1 days | Discharge: HOME | End: 2025-03-02

## 2025-03-02 DIAGNOSIS — C43.9 MALIGNANT MELANOMA OF SKIN, UNSPECIFIED: ICD-10-CM

## 2025-03-02 PROCEDURE — 72125 CT NECK SPINE W/O DYE: CPT | Mod: 26

## 2025-03-02 PROCEDURE — 74176 CT ABD & PELVIS W/O CONTRAST: CPT | Mod: 26

## 2025-03-02 PROCEDURE — 99233 SBSQ HOSP IP/OBS HIGH 50: CPT

## 2025-03-02 PROCEDURE — 70450 CT HEAD/BRAIN W/O DYE: CPT | Mod: 26

## 2025-03-02 RX ADMIN — AMLODIPINE BESYLATE 5 MILLIGRAM(S): 10 TABLET ORAL at 05:50

## 2025-03-02 RX ADMIN — ATORVASTATIN CALCIUM 10 MILLIGRAM(S): 80 TABLET, FILM COATED ORAL at 21:18

## 2025-03-02 RX ADMIN — Medication 1 APPLICATION(S): at 17:43

## 2025-03-02 RX ADMIN — Medication 1 APPLICATION(S): at 05:51

## 2025-03-02 RX ADMIN — MEMANTINE HYDROCHLORIDE 10 MILLIGRAM(S): 21 CAPSULE, EXTENDED RELEASE ORAL at 05:50

## 2025-03-02 RX ADMIN — Medication 50 MICROGRAM(S): at 05:50

## 2025-03-02 RX ADMIN — SERTRALINE 50 MILLIGRAM(S): 100 TABLET, FILM COATED ORAL at 11:28

## 2025-03-02 RX ADMIN — MEMANTINE HYDROCHLORIDE 10 MILLIGRAM(S): 21 CAPSULE, EXTENDED RELEASE ORAL at 17:43

## 2025-03-02 NOTE — ED CDU PROVIDER SUBSEQUENT DAY NOTE - PROGRESS NOTE DETAILS
seen the ot at the bed side - denies any abd pain - eating meal . +BM yesterday no diarrhea . less likely need isolation, will obtain the ct  abd and pelvis . and will obtain stool sample whenever she can advised from MIRTHA Tapia, pt had unwitnessed fall, found on floor next to bed. immediately went to bedside to assess pt, alert with confusion appears to be baseline as per my evaluation with her at beginning of shift around 8pm. bedside head to tie assessment + for hematoma to right occipital head otherwise without acute findings. priority head/neck called. c-spine secured, pt wheeled to CT. MD nation made aware. charge RN he - natalia morataya

## 2025-03-02 NOTE — ED ADULT NURSE REASSESSMENT NOTE - NSFALLHARMRISKINTERV_ED_ALL_ED
Assistance OOB with selected safe patient handling equipment if applicable/Assistance with ambulation/Communicate risk of Fall with Harm to all staff, patient, and family/Encourage patient to sit up slowly, dangle for a short time, stand at bedside before walking/Monitor gait and stability/Monitor for mental status changes and reorient to person, place, and time, as needed/Move patient closer to nursing station/within visual sight of ED staff/Orthostatic vital signs/Provide patient with walking aids/Provide visual cue: red socks, yellow wristband, yellow gown, etc/Reinforce activity limits and safety measures with patient and family/Toileting schedule using arm’s reach rule for commode and bathroom/Use of alarms - bed, stretcher, chair and/or video monitoring/Bed in lowest position, wheels locked, appropriate side rails in place/Call bell, personal items and telephone in reach/Instruct patient to call for assistance before getting out of bed/chair/stretcher/Non-slip footwear applied when patient is off stretcher/[place] to call system/Physically safe environment - no spills, clutter or unnecessary equipment/Purposeful Proactive Rounding/Room/bathroom lighting operational, light cord in reach

## 2025-03-03 DIAGNOSIS — E87.6 HYPOKALEMIA: ICD-10-CM

## 2025-03-03 LAB
ALBUMIN SERPL ELPH-MCNC: 2 G/DL — LOW (ref 3.3–5.2)
ALP SERPL-CCNC: 151 U/L — HIGH (ref 40–120)
ALT FLD-CCNC: 23 U/L — SIGNIFICANT CHANGE UP
ANION GAP SERPL CALC-SCNC: 11 MMOL/L — SIGNIFICANT CHANGE UP (ref 5–17)
AST SERPL-CCNC: 31 U/L — SIGNIFICANT CHANGE UP
BASOPHILS # BLD AUTO: 0.04 K/UL — SIGNIFICANT CHANGE UP (ref 0–0.2)
BASOPHILS NFR BLD AUTO: 0.4 % — SIGNIFICANT CHANGE UP (ref 0–2)
BILIRUB SERPL-MCNC: 0.3 MG/DL — LOW (ref 0.4–2)
BUN SERPL-MCNC: 29.6 MG/DL — HIGH (ref 8–20)
CALCIUM SERPL-MCNC: 7.4 MG/DL — LOW (ref 8.4–10.5)
CHLORIDE SERPL-SCNC: 105 MMOL/L — SIGNIFICANT CHANGE UP (ref 96–108)
CO2 SERPL-SCNC: 19 MMOL/L — LOW (ref 22–29)
CREAT SERPL-MCNC: 0.75 MG/DL — SIGNIFICANT CHANGE UP (ref 0.5–1.3)
EGFR: 78 ML/MIN/1.73M2 — SIGNIFICANT CHANGE UP
EGFR: 78 ML/MIN/1.73M2 — SIGNIFICANT CHANGE UP
EOSINOPHIL # BLD AUTO: 0.08 K/UL — SIGNIFICANT CHANGE UP (ref 0–0.5)
EOSINOPHIL NFR BLD AUTO: 0.8 % — SIGNIFICANT CHANGE UP (ref 0–6)
GLUCOSE SERPL-MCNC: 78 MG/DL — SIGNIFICANT CHANGE UP (ref 70–99)
HCT VFR BLD CALC: 30.7 % — LOW (ref 34.5–45)
HGB BLD-MCNC: 9.7 G/DL — LOW (ref 11.5–15.5)
IMM GRANULOCYTES # BLD AUTO: 0.08 K/UL — HIGH (ref 0–0.07)
IMM GRANULOCYTES NFR BLD AUTO: 0.8 % — SIGNIFICANT CHANGE UP (ref 0–0.9)
LYMPHOCYTES # BLD AUTO: 3.71 K/UL — HIGH (ref 1–3.3)
LYMPHOCYTES NFR BLD AUTO: 35 % — SIGNIFICANT CHANGE UP (ref 13–44)
MCHC RBC-ENTMCNC: 23.8 PG — LOW (ref 27–34)
MCHC RBC-ENTMCNC: 31.6 G/DL — LOW (ref 32–36)
MCV RBC AUTO: 75.4 FL — LOW (ref 80–100)
MONOCYTES # BLD AUTO: 0.54 K/UL — SIGNIFICANT CHANGE UP (ref 0–0.9)
MONOCYTES NFR BLD AUTO: 5.1 % — SIGNIFICANT CHANGE UP (ref 2–14)
NEUTROPHILS # BLD AUTO: 6.15 K/UL — SIGNIFICANT CHANGE UP (ref 1.8–7.4)
NEUTROPHILS NFR BLD AUTO: 57.9 % — SIGNIFICANT CHANGE UP (ref 43–77)
NRBC # BLD AUTO: 0 K/UL — SIGNIFICANT CHANGE UP (ref 0–0)
NRBC # FLD: 0 K/UL — SIGNIFICANT CHANGE UP (ref 0–0)
NRBC BLD AUTO-RTO: 0 /100 WBCS — SIGNIFICANT CHANGE UP (ref 0–0)
PLATELET # BLD AUTO: 325 K/UL — SIGNIFICANT CHANGE UP (ref 150–400)
PMV BLD: 8.6 FL — SIGNIFICANT CHANGE UP (ref 7–13)
POTASSIUM SERPL-MCNC: 3.5 MMOL/L — SIGNIFICANT CHANGE UP (ref 3.5–5.3)
POTASSIUM SERPL-SCNC: 3.5 MMOL/L — SIGNIFICANT CHANGE UP (ref 3.5–5.3)
PROT SERPL-MCNC: 4.4 G/DL — LOW (ref 6.6–8.7)
RBC # BLD: 4.07 M/UL — SIGNIFICANT CHANGE UP (ref 3.8–5.2)
RBC # FLD: 19.1 % — HIGH (ref 10.3–14.5)
SODIUM SERPL-SCNC: 135 MMOL/L — SIGNIFICANT CHANGE UP (ref 135–145)
WBC # BLD: 10.6 K/UL — HIGH (ref 3.8–10.5)
WBC # FLD AUTO: 10.6 K/UL — HIGH (ref 3.8–10.5)

## 2025-03-03 PROCEDURE — 99232 SBSQ HOSP IP/OBS MODERATE 35: CPT

## 2025-03-03 PROCEDURE — 99233 SBSQ HOSP IP/OBS HIGH 50: CPT

## 2025-03-03 PROCEDURE — 99223 1ST HOSP IP/OBS HIGH 75: CPT

## 2025-03-03 PROCEDURE — G0545: CPT

## 2025-03-03 RX ORDER — METRONIDAZOLE 250 MG
500 TABLET ORAL EVERY 12 HOURS
Refills: 0 | Status: DISCONTINUED | OUTPATIENT
Start: 2025-03-03 | End: 2025-03-07

## 2025-03-03 RX ORDER — CIPROFLOXACIN HCL 250 MG
500 TABLET ORAL EVERY 12 HOURS
Refills: 0 | Status: DISCONTINUED | OUTPATIENT
Start: 2025-03-03 | End: 2025-03-07

## 2025-03-03 RX ORDER — ENOXAPARIN SODIUM 100 MG/ML
40 INJECTION SUBCUTANEOUS EVERY 24 HOURS
Refills: 0 | Status: DISCONTINUED | OUTPATIENT
Start: 2025-03-03 | End: 2025-03-07

## 2025-03-03 RX ORDER — MUPIROCIN CALCIUM 20 MG/G
1 CREAM TOPICAL
Refills: 0 | Status: DISCONTINUED | OUTPATIENT
Start: 2025-03-03 | End: 2025-03-07

## 2025-03-03 RX ORDER — LEVOTHYROXINE SODIUM 300 MCG
75 TABLET ORAL DAILY
Refills: 0 | Status: DISCONTINUED | OUTPATIENT
Start: 2025-03-03 | End: 2025-03-07

## 2025-03-03 RX ADMIN — Medication 1 APPLICATION(S): at 17:32

## 2025-03-03 RX ADMIN — ENOXAPARIN SODIUM 40 MILLIGRAM(S): 100 INJECTION SUBCUTANEOUS at 21:29

## 2025-03-03 RX ADMIN — MEMANTINE HYDROCHLORIDE 10 MILLIGRAM(S): 21 CAPSULE, EXTENDED RELEASE ORAL at 06:24

## 2025-03-03 RX ADMIN — ATORVASTATIN CALCIUM 10 MILLIGRAM(S): 80 TABLET, FILM COATED ORAL at 21:29

## 2025-03-03 RX ADMIN — Medication 1 APPLICATION(S): at 06:23

## 2025-03-03 RX ADMIN — Medication 500 MILLIGRAM(S): at 17:29

## 2025-03-03 RX ADMIN — MEMANTINE HYDROCHLORIDE 10 MILLIGRAM(S): 21 CAPSULE, EXTENDED RELEASE ORAL at 17:28

## 2025-03-03 RX ADMIN — SERTRALINE 50 MILLIGRAM(S): 100 TABLET, FILM COATED ORAL at 11:23

## 2025-03-03 RX ADMIN — Medication 500 MILLIGRAM(S): at 17:28

## 2025-03-03 RX ADMIN — SODIUM CHLORIDE 75 MILLILITER(S): 9 INJECTION, SOLUTION INTRAVENOUS at 17:51

## 2025-03-03 RX ADMIN — Medication 1 APPLICATION(S): at 06:30

## 2025-03-03 RX ADMIN — AMLODIPINE BESYLATE 5 MILLIGRAM(S): 10 TABLET ORAL at 06:24

## 2025-03-03 RX ADMIN — Medication 50 MICROGRAM(S): at 06:24

## 2025-03-03 NOTE — ED CDU PROVIDER SUBSEQUENT DAY NOTE - PHYSICAL EXAMINATION
Physical Examination:   Gen: NAD, AO x 1  Head: NCAT  HEENT: EOMI, oral mucosa moist, normal conjunctiva, neck supple  Lung: no respiratory distress  CV:  Normal perfusion  Abd: soft, NT ND  MSK: No edema, no visible deformities  Neuro: No focal neurologic deficits  Skin: No rash   Psych: normal affect
Gen: No acute distress, non toxic. Very well appearing  HENT: NCAT, Mucous membranes moist  Eyes: pink conjunctivae, EOMI, PERRL  CV: RRR, nl s1/s2.  Resp: CTAB, normal rate and effort  GI: Abdomen soft, NT, ND. No rebound, no guarding  Neuro: A&O x 2 to person and place, sensorimotor intact without deficits   MSK: Full ROM ext x 4  Skin: No rashes. intact and perfused.
Gen: No acute distress, non toxic. Very well appearing  HENT: NCAT, Mucous membranes moist  Eyes: pink conjunctivae, EOMI, PERRL  CV: RRR, nl s1/s2.  Resp: CTAB, normal rate and effort  GI: Abdomen soft, NT, ND. No rebound, no guarding  Neuro: A&O x 2 to person and place, sensorimotor intact without deficits   MSK: Full ROM ext x 4  Skin: No rashes. intact and perfused.

## 2025-03-03 NOTE — PATIENT PROFILE ADULT - FALL HARM RISK - RISK INTERVENTIONS

## 2025-03-03 NOTE — ED CDU PROVIDER SUBSEQUENT DAY NOTE - [facility], [facility]

## 2025-03-03 NOTE — CONSULT NOTE ADULT - SUBJECTIVE AND OBJECTIVE BOX
INFECTIOUS DISEASES AND INTERNAL MEDICINE at [place]  =======================================================  Archie Egan MD  Diplomates American Board of Internal Medicine and Infectious Diseases  Telephone [phone]  Fax            [phone]  =======================================================    DEJAN [id number]      HPI: 84 year old female with known medical hx of htn, hypothyroidism ,depression and dementia  and Bullous pemphigoid (on prednisone ), melanoma under treatment by Dr Odom - presented 2/28 for diarrheaAND BD PAIN   CT  SCAN OF ABD PELVIS SHOWED  COLTIS WITH ? Diverticulitis  PPT WITH LEUKOCYTOSIS  ASKED TO EVALUATE FROM ID STANDPOINT      PAST MEDICAL & SURGICAL HISTORY:  Hypothyroid      Dementia      Melanoma      Hypertension      No significant past surgical history          ANTIBIOTICS  ciprofloxacin     Tablet 500 milliGRAM(s) Oral every 12 hours  metroNIDAZOLE    Tablet 500 milliGRAM(s) Oral every 12 hours      Allergies    Omnipaque 350 (Anaphylaxis)  penicillin (Unknown)    Intolerances        SOCIAL HISTORY:     FAMILY HX   FAMILY HISTORY:      Vital Signs Last 24 Hrs  T(C): 36.7 (03 Mar 2025 15:34), Max: 36.8 (02 Mar 2025 22:21)  T(F): 98 (03 Mar 2025 15:34), Max: 98.3 (02 Mar 2025 22:21)  HR: 72 (03 Mar 2025 15:34) (68 - 82)  BP: 118/67 (03 Mar 2025 15:34) (100/50 - 123/72)  BP(mean): --  RR: 18 (03 Mar 2025 15:34) (18 - 18)  SpO2: 98% (03 Mar 2025 15:34) (94% - 98%)    Parameters below as of 03 Mar 2025 15:34  Patient On (Oxygen Delivery Method): room air      Drug Dosing Weight  Height (cm): 149.9 (28 Feb 2025 13:55)  Weight (kg): 56.7 (28 Feb 2025 13:55)  BMI (kg/m2): 25.2 (28 Feb 2025 13:55)  BSA (m2): 1.51 (28 Feb 2025 13:55)      REVIEW OF SYSTEMS:    CONSTITUTIONAL:  As per HPI.    HEENT:  Eyes:  No diplopia or blurred vision. ENT:  No earache, sore throat or runny nose.    CARDIOVASCULAR:  No pressure, squeezing, strangling, tightness, heaviness or aching about the chest, neck, axilla or epigastrium.    RESPIRATORY:  No cough, shortness of breath, PND or orthopnea.    GASTROINTESTINAL:  No nausea, vomiting or diarrhea.    GENITOURINARY:  No dysuria, frequency or urgency.    MUSCULOSKELETAL:  As per HPI.    SKIN:  No change in skin, hair or nails.    NEUROLOGIC:  No paresthesias, fasciculations, seizures or weakness.                  PHYSICAL EXAMINATION:    GENERAL: The patient is a _____in no apparent distress. ___     VITAL SIGNS: T(C): 36.7 (03-03-25 @ 15:34), Max: 36.8 (03-02-25 @ 22:21)  HR: 72 (03-03-25 @ 15:34) (68 - 82)  BP: 118/67 (03-03-25 @ 15:34) (100/50 - 123/72)  RR: 18 (03-03-25 @ 15:34) (18 - 18)  SpO2: 98% (03-03-25 @ 15:34) (94% - 98%)  Wt(kg): --    HEENT: Head is normocephalic and atraumatic.  ANICTERIC  NECK: Supple. No carotid bruits.  No lymphadenopathy or thyromegaly.    LUNGS:COARSE BREATH SOUNDS    HEART: Regular rate and rhythm without murmur.    ABDOMEN: Soft,  MILD DIFFUSE TENDERNESS.  Positive bowel sounds.  No hepatosplenomegaly was noted. NO REBOUND NO GUARDING    EXTREMITIES: NO EDEMA NO ERYTHEMA    NEUROLOGIC: NON FOCAL      SKIN: No ulceration or induration present. NO RASH        BLOOD CULTURES  Culture Results:   <10,000 CFU/mL Normal Urogenital Mary (02-25 @ 23:52)       URINE CX          LABS:                        9.7    10.60 )-----------( 325      ( 03 Mar 2025 07:12 )             30.7     03-03    135  |  105  |  29.6[H]  ----------------------------<  78  3.5   |  19.0[L]  |  0.75    Ca    7.4[L]      03 Mar 2025 07:12    TPro  4.4[L]  /  Alb  2.0[L]  /  TBili  0.3[L]  /  DBili  x   /  AST  31  /  ALT  23  /  AlkPhos  151[H]  03-03      Urinalysis Basic - ( 03 Mar 2025 07:12 )    Color: x / Appearance: x / SG: x / pH: x  Gluc: 78 mg/dL / Ketone: x  / Bili: x / Urobili: x   Blood: x / Protein: x / Nitrite: x   Leuk Esterase: x / RBC: x / WBC x   Sq Epi: x / Non Sq Epi: x / Bacteria: x        RADIOLOGY & ADDITIONAL STUDIES:      ASSESSMENT/PLAN  84 year old female with known medical hx of htn, hypothyroidism ,depression and dementia  and Bullous pemphigoid (on prednisone ), melanoma under treatment by Dr Odom - presented 2/28 for diarrheaAND BD PAIN   CT  SCAN OF ABD PELVIS SHOWED  COLTIS WITH ? Diverticulitis  PPT WITH LEUKOCYTOSIS  PT UNABLE TO GIVE A GOOD HISTORY SOME MILD DEMENTIA  PT NON TOXIC   ABD EXAM BENIGN   UNCLEAR PCN ALLERGY  WILL NEED TO ASK FAMILY ABOUT ALLERGY  CAN CONTINUE CIPRO/FLAGYL FOR NOW  WILL DW/ ONCOLOGY                  NEIDA ALCANTAR MD

## 2025-03-03 NOTE — ED CDU PROVIDER SUBSEQUENT DAY NOTE - CLINICAL SUMMARY MEDICAL DECISION MAKING FREE TEXT BOX
83yo F PMHx of early dementia, HTN, HLD, Thyroid cancer, seeing Dermatology Dr. Mcnair for bullous pemphigoid, competed prednisone taper, on Dupixent every 2 weeks, metastatic melanoma started on Ipi/ Nivo on 11/26/25 x 3 cycles followed by Dr. Odom presents to ED sent in from facility for evaluation of generalized weakness, diarrhea. pt recent seen in ED 2/25 for same complaints, work up was neg at that time. this ED visit, labs significant for leukocytosis (18), hypokalemia (2.6) and hypothyroidism (tsh 12). cxr was rpt and neg. pt was eval by heme/onc who rec obs for symptomatic care. no overnight events. diarrhea and weakness has improved while in obs. anticipating dc today
84 year old female PMHx of early dementia, HTN, HLD, thyroid CA, metastatic melanoma followed by Dr. Odom presents to ED sent in from facility for evaluation of generalized weakness, diarrhea. pt recent seen in ED 2/25 for same complaints, work up was neg at that time. this ED visit, labs significant for leukocytosis (18), hypokalemia (2.6) and hypothyroidism (tsh 12). cxr was rpt and neg. pt was eval by heme/onc who rec obs for symptomatic. Physical therapy consulted as brother expressed increased level of care, recommending [facility].
84 year old female PMHx of early dementia, HTN, HLD, thyroid CA, metastatic melanoma followed by Dr. Odom presents to ED sent in from facility for evaluation of generalized weakness and diarrhea which has now resolved. Pt now pending SAW w/ SW following

## 2025-03-03 NOTE — ED ADULT NURSE REASSESSMENT NOTE - NS ED NURSE REASSESS COMMENT FT1
Assumed care of pt at 0730, pt is AOx2 NAD noted, pt resting comfortably in bed, side rails up and wheels locked. Pt breathing even and unlabored, pt denies any CP, SOB or HA. Pt is awaiting consult from heme/onc POC explained, pt verbalized understanding and has no acute complaints at this time.
Assumed care of pt at 1915 as stated in report from RN IRMA. Charting as noted. Patient A&O x2-3, denies pain/discomfort, denies CP/SOB. Updated on the plan of care. Call bell within reach, bed locked in lowest position.  No signs of acute distress noted, safety maintained.
Patient rec'd  from ER Nurse 1900, alert x's2, RA, POC reviewed.  Patient IV to right AC infiltrated. New IV 18 placed to left wrist. Patient tolerated IV well. Contact precautions for c-diff R/O. monitoring in place. Safety measures maintained.
Patient status post fall. Remain alert  to name ,, at baseline. Vss, patient able to move all extremities and denies pain or discomfort. All safety measures maintained. Call light in reach, stretcher low, close to nursing station in visible site. Will continue to monitor.
Pt A&Ox1 resting comfortably, shows no s/s of distress RR even and unlabored. Pt updated on plan of care
Pt is AOx2 NAD noted, pt resting comfortably in bed, side rails up and wheels locked. Pt breathing even and unlabored, pt denies any CP, SOB or HA.
Pt is AOx2 NAD noted, pt resting comfortably in bed, side rails up and wheels locked. Pt breathing even and unlabored, pt denies any CP, SOB or HA. Pt removed both IVS, educated on importance of intravenous access and new IV placed.
assumed care of pt from night shift RN, pt offers no complaints at this time. spoke with son thu on this date, updated on POC thus far. RR wnl, NAD
report given to CDU RN. son aware of POC at bedside.
Pt is AOx2 NAD noted, pt resting comfortably in bed, side rails up and wheels locked. Pt breathing even and unlabored, pt denies any CP, SOB or HA. Pt maintained on CM displaying NSR and 98% SPO2 on room air.
Assumed care of pt at 07:15 as stated in report from RN OR. Charting as noted. Patient A&O x1, denies pain/discomfort, denies CP/SOB. Updated on the plan of care. Call bell within reach, bed locked in lowest position. IV site flushed w/ NS. No redness, swelling or pain noted to site. No signs of acute distress noted, safety maintained. Pt remains on CM in NSR.
Patient found on floor by staff members. When assessed stretcher screw found on floor next to patient, suspected side rail when down when patient rolled over. Patient assessed, Vss (see flow sheet).  No visible injuries noted. Patient able to move all extremities without difficulty or complaint of pain. Patient remain remains at baseline. Tayla NEW made aware. Priority CT called. Stretcher exchanged,  fall risk with harm intervention in place. Tayla NEW to speak with Family.
Assumed care of patient at 730, alert and oriented x2, resting in bed, denies pain. Pt confused, at baseline per ongoing nurse. Comfort care done. No s/s if distress noted. 1 episode of watery diarrhea noted, Henrry Morris called and made aware. [place] changed per policy. Meal provided. Will continue to monitor.

## 2025-03-03 NOTE — ED CDU PROVIDER DISPOSITION NOTE - CLINICAL COURSE
This is a 84 yr old female with PMH of early dementia, HTN, HLD, Thyroid cancer, seeing Dermatology Dr. Mcnair for bullous pemphigoid, competed prednisone taper. Dupixent every 2 weeks metastatic melanoma started on Ipi/ Nivo on 11/26/25 x 3 cycles who was recently in the ED on 11/25/25   Now Brought back in to the er with weakness.  Patient had WBC 18 now down trending to 10.  Patient has had no diarrhea episodes while in ED.  Case d/w hospitalist Barbie, recommending ID and GI consult.  CT findings of colitis, chronic diverticulitis.  Started on Cipro/Flagyl, due to PCN allergy.  Patient denies any abdominal pain.  Patients case d/w MD Trotter from GI, recommends low fiber diet, outpatient follow up, no acute intervention.

## 2025-03-03 NOTE — ED CDU PROVIDER SUBSEQUENT DAY NOTE - PROGRESS NOTE DETAILS
patient re-assessed knows she is in the hospital, unaware of year, knows her birthday, plan for [facility], CT shows colitis vs chronic diverticulitis, denies abdominal pain, diarrhea, resolved.  Pending SW for placement.  WBC downtrending, finished course of steroids, and had h/o cancer.

## 2025-03-03 NOTE — ED CDU PROVIDER SUBSEQUENT DAY NOTE - NS ED ATTENDING STATEMENT MOD
This was a shared visit with the TIANA. I reviewed and verified the documentation.

## 2025-03-03 NOTE — PROGRESS NOTE ADULT - ASSESSMENT
84 yr old female with PMH of early dementia, HTN, HLD, Thyroid cancer, seeing Dermatology Dr. Mcnair for bullous pemphigoid, competed prednisone taper, on Dupixent every 2 weeks, with recently diagnosed metastatic melanoma started on Ipi/ Nivo on 11/26/25 x 3 cycles who was recently in the ED on 11/25/25   Now Brought back in to the er with weakness    # Melanoma   # Poor functional status/ Poor PO intake     - s/p 3 cycles of ipi / Nivo   - Plan per oncologist DR Odom to forgo 4th cycle and proceed with nivo maintainance  - Labs with cbc cmp TSh/ t4   - IV hydration     IN PROGRESS   84 yr old female with PMH of early dementia, HTN, HLD, Thyroid cancer, seeing Dermatology Dr. Mcnair for bullous pemphigoid, competed prednisone taper, on Dupixent every 2 weeks, with recently diagnosed metastatic melanoma started on Ipi/ Nivo since 11/26/25 x 3 cycles, last on 2/3/25 who was recently in the ED for fatigue/diarrhea  Now Brought back in to the er with weakness    # Metastatic Melanoma   # Poor functional status/ Poor PO intake     s/p IVF  diarrhea appears to have resolved  PT evaluation  Pending outpatient PET CT for restaging & further treatment plan

## 2025-03-03 NOTE — ED CDU PROVIDER SUBSEQUENT DAY NOTE - NSICDXPASTMEDICALHX_GEN_ALL_CORE_FT
PAST MEDICAL HISTORY:  Dementia     Hypertension     Hypothyroid     Melanoma     

## 2025-03-03 NOTE — ED CDU PROVIDER SUBSEQUENT DAY NOTE - ATTENDING APP SHARED VISIT CONTRIBUTION OF CARE
Pt resting comfortably at time of re-assessment. No events overnight. Will continue to monitor and provide symptomatic care
I have personally performed a history and physical examination of the patient and discussed management with the TIANA as well as the patient.  I reviewed the TIANA's note and agree with the documented findings and plan of care.  I have authored and modified critical sections of the Provider Note, including but not limited to HPI, Physical Exam and MDM.    84 year old female PMHx of early dementia, HTN, HLD, thyroid CA, metastatic melanoma followed by Dr. Odom presents to ED sent in from facility for evaluation of generalized weakness, diarrhea. pt recent seen in ED 2/25 for same complaints, work up was neg at that time. this ED visit, labs significant for leukocytosis (18), hypokalemia (2.6) and hypothyroidism (tsh 12). cxr was rpt and neg. pt was eval by heme/onc who rec obs for symptomatic. Physical therapy consulted as brother expressed increased level of care, recommending [facility]. Patient reported to have persistent diarrhea however no BM reported by nurses yesterday. Will obtain CT AP for further evaluation. Unlikely C. Diff given no BM, will send sample if provided. Disposition pending results and recommendations.
84 year old female PMHx of early dementia, HTN, HLD, thyroid CA, metastatic melanoma followed by Dr. Odmo presents to ED sent in from facility for evaluation of generalized weakness and diarrhea which has now resolved. Pt now pending SAW w/ SW following    I, Jose Gutierrez, performed the initial face to face bedside interview with this patient regarding history of present illness, review of symptoms and relevant past medical, social and family history.  I completed an independent physical examination.  I was the initial provider who evaluated this patient. I have signed out the follow up of any pending tests (i.e. labs, radiological studies) to the ACP.  I have communicated the patient’s plan of care and disposition with the ACP.

## 2025-03-03 NOTE — ED CDU PROVIDER DISPOSITION NOTE - ATTENDING CONTRIBUTION TO CARE
This is a 84 yr old female with PMH of early dementia, HTN, HLD, Thyroid cancer, seeing Dermatology Dr. Mcnair for bullous pemphigoid, competed prednisone taper. Dupixent every 2 weeks metastatic melanoma started on Ipi/ Nivo on 11/26/25 x 3 cycles who was recently in the ED on 11/25/25   Now Brought back in to the er with weakness.  Patient had WBC 18 now down trending to 10.  Patient has had no diarrhea episodes while in ED.  Case d/w hospitalist Barbie, recommending ID and GI consult.  CT findings of colitis, chronic diverticulitis.  Started on Cipro/Flagyl, due to PCN allergy.  Patient denies any abdominal pain.  Patients case d/w MD Trotter from GI, recommends low fiber diet, outpatient follow up, no acute intervention.    I, Jose Gutierrez, performed the initial face to face bedside interview with this patient regarding history of present illness, review of symptoms and relevant past medical, social and family history.  I completed an independent physical examination.  I was the initial provider who evaluated this patient. I have signed out the follow up of any pending tests (i.e. labs, radiological studies) to the ACP.  I have communicated the patient’s plan of care and disposition with the ACP.

## 2025-03-03 NOTE — ED CDU PROVIDER SUBSEQUENT DAY NOTE - HISTORY
Pt resting comfortably at time of re-assessment. No events overnight. Pending placement. Will continue to monitor.
Pt remained stable. Pt with unwitnessed fall overnight OOB, CT head/neck without acute findings. attempted to call HCP on file Leonard Ashley, no answer left message
Pt resting comfortably at time of re-assessment. No events overnight. Will continue to monitor and provide symptomatic care

## 2025-03-03 NOTE — PROGRESS NOTE ADULT - SUBJECTIVE AND OBJECTIVE BOX
REASON FOR CONSULTATION:     Interval History:  S/p fall overnight  denies pain  One episode of diarrhea yesterday, none overnight, none today           REVIEW OF SYSTEMS:  Constitutional, Eyes, ENT, Cardiovascular, Respiratory, Gastrointestinal, Genitourinary, Musculoskeletal, Integumentary, Neurological, Psychiatric, Endocrine, Heme/Lymph, and Allergic/Immunologic review of systems are otherwise negative except as noted in the HPI.          MEDICATIONS  (STANDING):  amLODIPine   Tablet 5 milliGRAM(s) Oral daily  ammonium lactate 12% Lotion 1 Application(s) Topical two times a day  atorvastatin 10 milliGRAM(s) Oral at bedtime  ciprofloxacin     Tablet 500 milliGRAM(s) Oral every 12 hours  clobetasol 0.05% Ointment 1 Application(s) Topical two times a day  lactated ringers. 1000 milliLiter(s) (75 mL/Hr) IV Continuous <Continuous>  levothyroxine 50 MICROGram(s) Oral daily  memantine 10 milliGRAM(s) Oral two times a day  metroNIDAZOLE    Tablet 500 milliGRAM(s) Oral every 12 hours  sertraline 50 milliGRAM(s) Oral daily    MEDICATIONS  (PRN):    Vital Signs Last 24 Hrs  T(C): 36.7 (03 Mar 2025 15:34), Max: 36.8 (02 Mar 2025 22:21)  T(F): 98 (03 Mar 2025 15:34), Max: 98.3 (02 Mar 2025 22:21)  HR: 72 (03 Mar 2025 15:34) (68 - 82)  BP: 118/67 (03 Mar 2025 15:34) (100/50 - 123/72)  BP(mean): --  RR: 18 (03 Mar 2025 15:34) (18 - 18)  SpO2: 98% (03 Mar 2025 15:34) (94% - 98%)    Parameters below as of 03 Mar 2025 15:34  Patient On (Oxygen Delivery Method): room air        PHYSICAL EXAM:    Awake alert   [id number]  RS B/l BS  aBd SOft non tender  ext; no pedal edema      LABS:                        9.7    10.60 )-----------( 325      ( 03 Mar 2025 07:12 )             30.7     03-03    135  |  105  |  29.6[H]  ----------------------------<  78  3.5   |  19.0[L]  |  0.75    Ca    7.4[L]      03 Mar 2025 07:12    TPro  4.4[L]  /  Alb  2.0[L]  /  TBili  0.3[L]  /  DBili  x   /  AST  31  /  ALT  23  /  AlkPhos  151[H]  03-03                  RADIOLOGY & ADDITIONAL STUDIES:  < from: CT Abdomen and Pelvis No Cont (03.02.25 @ 18:42) >  IMPRESSION:  Mild descending colitis.  Short segment mural thickening of the proximal to mid sigmoid colon.   Differentials include decompression versus sequelae of chronic   diverticulitis. Underlying lesion cannot be ruled out.    < end of copied text >

## 2025-03-04 ENCOUNTER — APPOINTMENT (OUTPATIENT)
Dept: NUCLEAR MEDICINE | Facility: CLINIC | Age: 85
End: 2025-03-04

## 2025-03-04 LAB
ALBUMIN SERPL ELPH-MCNC: 2.2 G/DL — LOW (ref 3.3–5.2)
ALP SERPL-CCNC: 158 U/L — HIGH (ref 40–120)
ALT FLD-CCNC: 26 U/L — SIGNIFICANT CHANGE UP
ANION GAP SERPL CALC-SCNC: 13 MMOL/L — SIGNIFICANT CHANGE UP (ref 5–17)
AST SERPL-CCNC: 37 U/L — HIGH
BILIRUB SERPL-MCNC: 0.4 MG/DL — SIGNIFICANT CHANGE UP (ref 0.4–2)
BUN SERPL-MCNC: 23.6 MG/DL — HIGH (ref 8–20)
CALCIUM SERPL-MCNC: 7.5 MG/DL — LOW (ref 8.4–10.5)
CHLORIDE SERPL-SCNC: 105 MMOL/L — SIGNIFICANT CHANGE UP (ref 96–108)
CO2 SERPL-SCNC: 19 MMOL/L — LOW (ref 22–29)
CREAT SERPL-MCNC: 0.62 MG/DL — SIGNIFICANT CHANGE UP (ref 0.5–1.3)
EGFR: 87 ML/MIN/1.73M2 — SIGNIFICANT CHANGE UP
EGFR: 87 ML/MIN/1.73M2 — SIGNIFICANT CHANGE UP
GLUCOSE SERPL-MCNC: 73 MG/DL — SIGNIFICANT CHANGE UP (ref 70–99)
HCT VFR BLD CALC: 34.6 % — SIGNIFICANT CHANGE UP (ref 34.5–45)
HGB BLD-MCNC: 10.7 G/DL — LOW (ref 11.5–15.5)
MCHC RBC-ENTMCNC: 23.7 PG — LOW (ref 27–34)
MCHC RBC-ENTMCNC: 30.9 G/DL — LOW (ref 32–36)
MCV RBC AUTO: 76.5 FL — LOW (ref 80–100)
NRBC # BLD AUTO: 0 K/UL — SIGNIFICANT CHANGE UP (ref 0–0)
NRBC # FLD: 0 K/UL — SIGNIFICANT CHANGE UP (ref 0–0)
NRBC BLD AUTO-RTO: 0 /100 WBCS — SIGNIFICANT CHANGE UP (ref 0–0)
PLATELET # BLD AUTO: 367 K/UL — SIGNIFICANT CHANGE UP (ref 150–400)
PMV BLD: 8.5 FL — SIGNIFICANT CHANGE UP (ref 7–13)
POTASSIUM SERPL-MCNC: 3.6 MMOL/L — SIGNIFICANT CHANGE UP (ref 3.5–5.3)
POTASSIUM SERPL-SCNC: 3.6 MMOL/L — SIGNIFICANT CHANGE UP (ref 3.5–5.3)
PROT SERPL-MCNC: 4.7 G/DL — LOW (ref 6.6–8.7)
RBC # BLD: 4.52 M/UL — SIGNIFICANT CHANGE UP (ref 3.8–5.2)
RBC # FLD: 19.3 % — HIGH (ref 10.3–14.5)
SODIUM SERPL-SCNC: 137 MMOL/L — SIGNIFICANT CHANGE UP (ref 135–145)
WBC # BLD: 15.01 K/UL — HIGH (ref 3.8–10.5)
WBC # FLD AUTO: 15.01 K/UL — HIGH (ref 3.8–10.5)

## 2025-03-04 PROCEDURE — 99232 SBSQ HOSP IP/OBS MODERATE 35: CPT

## 2025-03-04 PROCEDURE — 99223 1ST HOSP IP/OBS HIGH 75: CPT

## 2025-03-04 PROCEDURE — G0545: CPT

## 2025-03-04 PROCEDURE — 99233 SBSQ HOSP IP/OBS HIGH 50: CPT

## 2025-03-04 PROCEDURE — 72192 CT PELVIS W/O DYE: CPT | Mod: 26

## 2025-03-04 RX ADMIN — Medication 1 APPLICATION(S): at 16:03

## 2025-03-04 RX ADMIN — MUPIROCIN CALCIUM 1 APPLICATION(S): 20 CREAM TOPICAL at 16:04

## 2025-03-04 RX ADMIN — Medication 500 MILLIGRAM(S): at 16:04

## 2025-03-04 RX ADMIN — SERTRALINE 50 MILLIGRAM(S): 100 TABLET, FILM COATED ORAL at 16:07

## 2025-03-04 RX ADMIN — AMLODIPINE BESYLATE 5 MILLIGRAM(S): 10 TABLET ORAL at 05:42

## 2025-03-04 RX ADMIN — ATORVASTATIN CALCIUM 10 MILLIGRAM(S): 80 TABLET, FILM COATED ORAL at 21:03

## 2025-03-04 RX ADMIN — Medication 75 MICROGRAM(S): at 05:42

## 2025-03-04 RX ADMIN — MEMANTINE HYDROCHLORIDE 10 MILLIGRAM(S): 21 CAPSULE, EXTENDED RELEASE ORAL at 05:42

## 2025-03-04 RX ADMIN — Medication 500 MILLIGRAM(S): at 05:42

## 2025-03-04 RX ADMIN — MEMANTINE HYDROCHLORIDE 10 MILLIGRAM(S): 21 CAPSULE, EXTENDED RELEASE ORAL at 16:04

## 2025-03-04 RX ADMIN — ENOXAPARIN SODIUM 40 MILLIGRAM(S): 100 INJECTION SUBCUTANEOUS at 21:03

## 2025-03-04 RX ADMIN — Medication 1 APPLICATION(S): at 05:41

## 2025-03-04 RX ADMIN — MUPIROCIN CALCIUM 1 APPLICATION(S): 20 CREAM TOPICAL at 05:41

## 2025-03-04 NOTE — H&P ADULT - ASSESSMENT
84 year old female wih known hx of bullous pemphigoid, hypothyroidism, htn mood disorder and dementia and newly diagnosed melanoma under treatment with Dr villa presents with diarrhea- she is a poor historian 0 discussed with ED Oncology and Gi and ID  plan to treat with abx and discharge to facility - and out patient Gi Follow up     colitis - follw C diff   cont abx started in ED = cipro flagyl- DW ID   DW GI agree with above - Dr Trotter office will call and schedle out patient follow up    mood disorder - cont sertraline     Htn cont Norvasc     Pemphigoid- cont out patient tt    melanoma - DW Heme onc  plan for modification of out patient therapy per Dr Odom     Dvt PPX- LOvenox     dispo- 1-2 days - placement PT eval may be difficult with dementia and on going tt for melanoma

## 2025-03-04 NOTE — H&P ADULT - NSHPPHYSICALEXAM_GEN_ALL_CORE
GENERAL: NAD,  elderly pleasantly cnfused  HEAD:  Atraumatic, Normocephalic  EYES: EOMI, , conjunctiva and sclera clear  ENMT: Moist mucous membranes,   NECK: Supple,  Normal thyroid  NERVOUS SYSTEM:  Alert & Oriented X1 Moves B/L upper and lower extremities;   CHEST/LUNG: bilaterally Auscultation; No rales, rhonchi, wheezing,  HEART: Regular rate and rhythm; No murmurs,   ABDOMEN: Soft, Nontender, Nondistended; Bowel sounds present  EXTREMITIES:  2+ Peripheral Pulses, No cyanosis, or edema

## 2025-03-04 NOTE — CONSULT NOTE ADULT - ASSESSMENT
84 year old female wih PMH of newly diagnosed melanoma on Ipi/ Nivo last cycle 2/3/25, bullous pemphigoid on Dupixent, hypothyroidism, dementia presents with diarrhea. Found to have colitis. Palliative consulted for help with GOC and support in the setting of complex medical issues.      #Colitis  - CTAP demonstrated colitis vs chronic diverticulitis    - On empiric Flagyl/Cipro  - ID following   - Outpatient GI follow up  - Rest of care per primary team     #Metastatic melanoma   - New diagnosed, on Ipi/ Nivo, last cycle 2/3/25, follows with Dr. Odom  - Outpatient follow up with modified therapy   - heme onc following    #Debility  - Assist with care  - Turn/reposition  - Safety precautions    #Encounter for palliative care   #Advance care planning  - In progress *** 84 year old female wih PMH of newly diagnosed melanoma on Ipi/ Nivo last cycle 2/3/25, bullous pemphigoid on Dupixent, hypothyroidism, dementia presents with fatigue and diarrhea. Found to have colitis. Palliative consulted for help with GOC and support in the setting of complex medical issues.      #Colitis  - CTAP demonstrated colitis with possible chronic diverticulitis    - On empiric Flagyl/Cipro  - ID following   - Outpatient GI follow up  - Rest of care per primary team     #Metastatic melanoma   - New diagnose in 11/2024, on Ipi/ Nivo, last cycle 2/3/25, follows with Dr. Odom  - Outpatient follow up for restaging  - Heme onc following    #Dementia  - AOx2, confused during interview, unable to recall why she is here and her cancer diagnosis  - Per chart review, able to ambulate independently, needs cues/reminders for ADLs  - On home memantine   - Safety precautions    #Encounter for palliative care   #Advance care planning  - Patient with dementia, AOx2, per chart review, patient is  and does not have children. Has a brother Stan who is in his 90s. Adopted brother Leonard is the HCP, and patient does have a living will.  84 year old female with PMH of newly diagnosed melanoma on Ipi/ Nivo last cycle 2/3/25, bullous pemphigoid on Dupixent, hypothyroidism, dementia presents with fatigue and diarrhea. Found to have colitis. Palliative consulted for help with GOC and support in the setting of complex medical issues.      #Colitis  - CTAP demonstrated colitis with possible chronic diverticulitis    - On empiric Flagyl/Cipro  - ID following   - Outpatient GI follow up  - Rest of care per primary team     #Metastatic melanoma   - New diagnose in 11/2024, on Ipi/ Nivo, last cycle 2/3/25, follows with Dr. Odom  - Outpatient follow up for restaging  - Heme onc following    #Dementia  - AOx2, confused during interview, unable to recall why she is here and her cancer diagnosis  - Per chart review, able to ambulate independently, needs cues/reminders for ADLs  - On home memantine   - Safety precautions    #Encounter for palliative care   #Advance care planning  - Patient with dementia, AOx2, per chart review, patient is  and does not have children. Has a brother Randy who is in his 90s. Adopted brother Leonard is the HCP, and patient does have a living will.

## 2025-03-04 NOTE — CONSULT NOTE ADULT - SUBJECTIVE AND OBJECTIVE BOX
HPI:  84 year old female wih known hx of bullous pemphigoid, dementia and newly diagnosed melanoma under treatment with Dr villa presents with diarrhea- she is a poor historian 0 discussed with ED Oncology and Gi and ID  plan to treat with abx and discharge to facility - and out patient Gi Follow up   patient is unable to give hx - states " i live ere" denied any complaints including diarrhea CP fever chills- doesn't know her melanoma hx either  (04 Mar 2025 08:24)    84 year old female wih PMH of newly diagnosed melanoma on Ipi/ Nivo last cycle 2/3/25, bullous pemphigoid on Dupixent, hypothyroidism, dementia presents with diarrhea. Found to have colitis. Palliative consulted for help with GOC and support in the setting of complex medical issues.      PERTINENT PMH REVIEWED: Yes    PAST MEDICAL & SURGICAL HISTORY:  Hypothyroid  Dementia  Melanoma  Hypertension  No significant past surgical history    SOCIAL HISTORY:  Per chart review                    Substance history: None                    Admitted from:  home                    Zoroastrianism/spirituality: No affiliation                    Cultural concerns: None                      Surrogate/HCP/Guardian: HCP brother Anthony Labadie, Phone#: [phone]    FAMILY HISTORY:  NA    Allergies  Omnipaque 350 (Anaphylaxis)  penicillin (Unknown)  Intolerances    *** In progress  ADVANCE DIRECTIVES/TREATMENT PREFERENCES:  Full code    Baseline ADLs (prior to admission):  Independent/ Dependent      Karnofsky/Palliative Performance Status Version 2:  %  http://npcrc.org/files/news/palliative_performance_scale_ppsv2.pdf    Present Symptoms:     Dyspnea: Mild Moderate Severe  Nausea/Vomiting: Yes No  Anxiety:  Yes No  Depression: Yes No  Fatigue: Yes No  Loss of appetite: Yes No  Constipation:     Pain: Yes No            Character-            Duration-            Effect-            Factors-            Frequency-            Location-            Severity-    Pain AD Score:  [email or web address] (press ctrl + left click to view)    Review of Systems: Reviewed                     Negative:                     Positive:  Unable to obtain due to poor mentation   All others negative    MEDICATIONS  (STANDING):  amLODIPine   Tablet 5 milliGRAM(s) Oral daily  ammonium lactate 12% Lotion 1 Application(s) Topical two times a day  atorvastatin 10 milliGRAM(s) Oral at bedtime  ciprofloxacin     Tablet 500 milliGRAM(s) Oral every 12 hours  clobetasol 0.05% Ointment 1 Application(s) Topical two times a day  enoxaparin Injectable 40 milliGRAM(s) SubCutaneous every 24 hours  levothyroxine 75 MICROGram(s) Oral daily  memantine 10 milliGRAM(s) Oral two times a day  metroNIDAZOLE    Tablet 500 milliGRAM(s) Oral every 12 hours  mupirocin 2% Ointment 1 Application(s) Topical two times a day  sertraline 50 milliGRAM(s) Oral daily    MEDICATIONS  (PRN):      PHYSICAL EXAM:    Vital Signs Last 24 Hrs  T(C): 36.5 (04 Mar 2025 11:01), Max: 36.7 (03 Mar 2025 15:34)  T(F): 97.7 (04 Mar 2025 11:01), Max: 98 (03 Mar 2025 15:34)  HR: 81 (04 Mar 2025 11:01) (72 - 82)  BP: 105/68 (04 Mar 2025 11:01) (105/68 - 120/55)  BP(mean): 77 (04 Mar 2025 07:27) (77 - 87)  RR: 18 (04 Mar 2025 11:01) (18 - 18)  SpO2: 98% (04 Mar 2025 11:01) (94% - 100%)    Parameters below as of 04 Mar 2025 11:01  Patient On (Oxygen Delivery Method): room air        General: alert  oriented x ____ lethargic agitated delirious                  cachexia  nonverbal  coma    HEENT: normal  dry mouth  ET tube/trach    Lungs: comfortable tachypnea/labored breathing  excessive secretions    CV: normal  tachycardia    GI: normal  distended  tender  no BS               PEG/NG/OG tube  constipation  last BM:     : normal  incontinent  oliguria/anuria  lara    MSK: normal  weakness  edema             ambulatory  bedbound/wheelchair bound    Neuro: no focal deficits cognitive impairment dysphagia dysarthria hemiplegia     Skin: normal  pressure ulcers- Stage_____  no rash    LABS:                        10.7   15.01 )-----------( 367      ( 04 Mar 2025 04:16 )             34.6     03-04    137  |  105  |  23.6[H]  ----------------------------<  73  3.6   |  19.0[L]  |  0.62    Ca    7.5[L]      04 Mar 2025 04:16    TPro  4.7[L]  /  Alb  2.2[L]  /  TBili  0.4  /  DBili  x   /  AST  37[H]  /  ALT  26  /  AlkPhos  158[H]  03-04      Urinalysis Basic - ( 04 Mar 2025 04:16 )    Color: x / Appearance: x / SG: x / pH: x  Gluc: 73 mg/dL / Ketone: x  / Bili: x / Urobili: x   Blood: x / Protein: x / Nitrite: x   Leuk Esterase: x / RBC: x / WBC x   Sq Epi: x / Non Sq Epi: x / Bacteria: x      I&O's Summary      RADIOLOGY & ADDITIONAL STUDIES:  < from: CT Pelvis Bony Only No Cont (03.04.25 @ 01:01) >    ACC: [id number] EXAM:  CT PELVIS BONY ONLY   ORDERED BY: CHANDNI VELASCO     PROCEDURE DATE:  03/04/2025          INTERPRETATION:  CLINICAL INFORMATION: Status post fall.    TECHNIQUE: CT of the pelvis was performed in bone and soft tissue windows   with coronal and sagittal reformats. No intravenous contrast was   administered.    COMPARISON: CT of the abdomen and pelvis from 3/2/2025.    FINDINGS:    Bone: No fracture or dislocation. Bone island in the superior right   acetabulum. Degenerative changes of the visualized lower lumbar spine,   sacroiliac joints and pubic symphysis.    Soft tissues: Scattered colonic diverticulosis without diverticulitis.   Atrophic uterus. No adnexal masses. Distended urinary bladder.   Redemonstrated mild subcutaneous inflammatory change in the pelvis.    IMPRESSION:  No fracture or dislocation of the pelvis or either hip.    --- End of Report ---            EYAD LEES MD; Attending Radiologist  This document has been electronically signed. Mar  4 2025  1:19AM    < end of copied text >  < from: CT Head No Cont (03.02.25 @ 23:06) >    ACC: [id number] EXAM:  CT CERVICAL SPINE   ORDERED BY: SHAHBAZ GONZALES     ACC: [id number] EXAM:  CT BRAIN   ORDERED BY: SHAHBAZ GONZALES     PROCEDURE DATE:  03/02/2025          INTERPRETATION:  CLINICAL INFORMATION: Trauma. Status post fall.    COMPARISON: Brain MRI 11/19/2024    CONTRAST:  IV Contrast: NONE  .  TECHNIQUE: A noncontrast head CT and a noncontrast cervical spine CT were   performed. The head CT was performed with contiguous 5 mm transaxial   images from the skull base to vertex. Images were reviewed in brain,   stroke, subdural and bone windows.  The cervical spine CT was performed with transaxial thin section images   from the occiput to the T3 level.  Coronal and sagittal reformatted   images were created from the transaxial source data.  Images were   reviewed in bone and soft tissue windows.    FINDINGS:  CT head:  INTRA-AXIAL: No acute hemorrhage, vasogenic edema or signs for acute   territorial infarct. Stable patchy areas of low-attenuation in the   bihemispheric white matter, corresponding to T2/flair hyperintensities on   the brain MRI, nonspecific.  EXTRA-AXIAL: No mass or collection. Basal cisterns are clear.  VENTRICLES: Stable size and configuration demonstrating no cerebral   volume loss. No midline shift.  VISUALIZED PARANASAL SINUSES: Visualized maxillary sinus and posterior   ethmoid air cells are opacified.  MASTOID AIR CELLS: Clear.  CALVARIUM: Intact.  SOFT TISSUES: Unremarkable.  ORBITS: Bilateral cataract surgery.  MISCELLANEOUS: None.    CT cervical spine:  OSSEOUS STRUCTURES: No acute fracture. Vertebral body heights are   maintained. No suspicious osteoblastic or lytic lesion.  ALIGNMENT: Mild straightening of the normal cervical lordosis with slight   anterior listhesis of C3 on C4.  INVERTEBRAL DISCS: Multilevel intervertebral disc space narrowing. No   advanced endplate erosive changes or large disc herniation. Mild disc   osteophytic ridging from C3/C4 through C5/C6. Multilevel facet   arthropathy.  SPINAL CANAL: Congenitally normal. No evidence for epidural hematoma.   Mild to moderate canal and foraminal stenosis C4/C5 and C5/C6.  EXTRASPINAL TISSUES: No prevertebral edema.  MISCELLANEOUS: Mild centrilobular emphysema seen in the upper lobes.    IMPRESSION:  Head CT: No acute hemorrhage, extra-axial collection, vasogenic edema or   calvarial fracture. Mild chronic microvascular changes.    Cervical spine CT: No acute cervical spine fracture or traumatic   malalignment. Degenerative changes most notable C4/C5 and C5/C6.    --- End of Report ---            ROWDY BLAKE MD; Attending Radiologist  This document has been electronically signed. Mar  3 2025 12:33AM    < end of copied text >  < from: CT Abdomen and Pelvis No Cont (03.02.25 @ 18:42) >    ACC: [id number] EXAM:  CT ABDOMEN AND PELVIS   ORDERED BY: BERENICE MEDINA     PROCEDURE DATE:  03/02/2025          INTERPRETATION:  CLINICAL INFORMATION: Diarrhea. History of thyroid   cancer and melanoma.    COMPARISON: CT: Sonography 3/31/2021. PET/CT 10/8/2024.    CONTRAST/COMPLICATIONS:  IV Contrast: NONE  Oral Contrast: NONE  .    PROCEDURE:  CT of the Abdomen and Pelvis was performed.  Sagittal and coronal reformats were performed.    FINDINGS:  LOWER CHEST: Coronary artery and thoracic aortic calcifications.  Stable pulmonary nodules. The largest one in the left lower lobe   measuring 1.0 cm (3/5).    LIVER: Steatosis. Few subcentimeter hypodense foci, too small to   characterize.  BILE DUCTS: Mild left hepatic lobe intrahepatic biliary dilatation.  GALLBLADDER: Distended with sludge.  SPLEEN: Within normal limits.  PANCREAS: Within normal limits.  ADRENALS: Within normal limits.  KIDNEYS/URETERS: Within normal limits.    BLADDER: Within normal limits.  REPRODUCTIVE ORGANS: Uterus and adnexa within normal limits.    BOWEL: No bowel obstruction. Colonic diverticulosis. Short segment mural   thickening of the proximal to mid sigmoid colon. Long segment mild mural   thickening of the descending colon with trace pericholecystic fat   stranding.  PERITONEUM/RETROPERITONEUM: Within normal limits.  VESSELS: Atherosclerotic changes.  LYMPH NODES: No lymphadenopathy.  ABDOMINAL WALL: Within normal limits.  BONES: Degenerative changes. Grade 1 anterolisthesis of L5 on S1. Mild   compression deformity of L1.    IMPRESSION:  Mild descending colitis.  Short segment mural thickening of the proximal to mid sigmoid colon.   Differentials include decompression versus sequelae of chronic    d. Underlying lesion cannot be ruled out.        --- End of Report ---            OTILIO DUKES MD; Attending Radiologist  This document has been electronically signed. Mar  2 2025  7:26PM    < end of copied text >    RECENT CULTURES:  02-25 @ 23:52 Clean Catch Clean Catch (Midstream)     <10,000 CFU/mL Normal Urogenital Mary   HPI:  84 year old female wih known hx of bullous pemphigoid, dementia and newly diagnosed melanoma under treatment with Dr villa presents with diarrhea- she is a poor historian 0 discussed with ED Oncology and Gi and ID  plan to treat with abx and discharge to facility - and out patient Gi Follow up   patient is unable to give hx - states " i live ere" denied any complaints including diarrhea CP fever chills- doesn't know her melanoma hx either  (04 Mar 2025 08:24)    84 year old female wih PMH of newly diagnosed melanoma on Ipi/ Nivo last cycle 2/3/25, bullous pemphigoid on Dupixent, hypothyroidism, dementia presents with diarrhea. Found to have colitis. Palliative consulted for help with GOC and support in the setting of complex medical issues.      PERTINENT PMH REVIEWED: Yes    PAST MEDICAL & SURGICAL HISTORY:  Hypothyroid  Dementia  Melanoma  Hypertension  No significant past surgical history    SOCIAL HISTORY:  Per chart review                    Substance history: None                    Admitted from:  Independent living facility                    Orthodoxy/spirituality: No affiliation                    Cultural concerns: None                      Surrogate/HCP/Guardian: HCP brother Anthony Labadie, Phone#: [phone]    FAMILY HISTORY:  NA    Allergies  Omnipaque 350 (Anaphylaxis)  penicillin (Unknown)  Intolerances    ADVANCE DIRECTIVES/TREATMENT PREFERENCES:  Full code    Baseline ADLs (prior to admission):  Independent     Karnofsky/Palliative Performance Status Version 2:  40%  http://npcrc.org/files/news/palliative_performance_scale_ppsv2.pdf    Present Symptoms:   Dyspnea: No  Nausea/Vomiting: No  Anxiety:  No  Depression: No  Fatigue: No  Loss of appetite: No  Constipation: No  Pain: No            Character-            Duration-            Effect-            Factors-            Frequency-            Location-            Severity-    Pain AD Score:  [email or web address]/painad.pdf (press ctrl + left click to view)    Review of Systems: Reviewed  All others negative    MEDICATIONS  (STANDING):  amLODIPine   Tablet 5 milliGRAM(s) Oral daily  ammonium lactate 12% Lotion 1 Application(s) Topical two times a day  atorvastatin 10 milliGRAM(s) Oral at bedtime  ciprofloxacin     Tablet 500 milliGRAM(s) Oral every 12 hours  clobetasol 0.05% Ointment 1 Application(s) Topical two times a day  enoxaparin Injectable 40 milliGRAM(s) SubCutaneous every 24 hours  levothyroxine 75 MICROGram(s) Oral daily  memantine 10 milliGRAM(s) Oral two times a day  metroNIDAZOLE    Tablet 500 milliGRAM(s) Oral every 12 hours  mupirocin 2% Ointment 1 Application(s) Topical two times a day  sertraline 50 milliGRAM(s) Oral daily    MEDICATIONS  (PRN):      PHYSICAL EXAM:    Vital Signs Last 24 Hrs  T(C): 36.5 (04 Mar 2025 11:01), Max: 36.7 (03 Mar 2025 15:34)  T(F): 97.7 (04 Mar 2025 11:01), Max: 98 (03 Mar 2025 15:34)  HR: 81 (04 Mar 2025 11:01) (72 - 82)  BP: 105/68 (04 Mar 2025 11:01) (105/68 - 120/55)  BP(mean): 77 (04 Mar 2025 07:27) (77 - 87)  RR: 18 (04 Mar 2025 11:01) (18 - 18)  SpO2: 98% (04 Mar 2025 11:01) (94% - 100%)    Parameters below as of 04 Mar 2025 11:01  Patient On (Oxygen Delivery Method): room air        General: alert  oriented x2, confused     HEENT: Dry mouth    Lungs: comfortable nonlabored breathing    CV: normal      GI: normal  nondistended  nontender     : normal     MSK: weakness      Neuro: no focal deficits, mild cognitive impairment, unable to tell why she is at the hospital      Skin: normal     LABS:                        10.7   15.01 )-----------( 367      ( 04 Mar 2025 04:16 )             34.6     03-04    137  |  105  |  23.6[H]  ----------------------------<  73  3.6   |  19.0[L]  |  0.62    Ca    7.5[L]      04 Mar 2025 04:16    TPro  4.7[L]  /  Alb  2.2[L]  /  TBili  0.4  /  DBili  x   /  AST  37[H]  /  ALT  26  /  AlkPhos  158[H]  03-04      Urinalysis Basic - ( 04 Mar 2025 04:16 )    Color: x / Appearance: x / SG: x / pH: x  Gluc: 73 mg/dL / Ketone: x  / Bili: x / Urobili: x   Blood: x / Protein: x / Nitrite: x   Leuk Esterase: x / RBC: x / WBC x   Sq Epi: x / Non Sq Epi: x / Bacteria: x      I&O's Summary      RADIOLOGY & ADDITIONAL STUDIES:  < from: CT Pelvis Bony Only No Cont (03.04.25 @ 01:01) >    ACC: [id number] EXAM:  CT PELVIS BONY ONLY   ORDERED BY: CHANDNI VELASCO     PROCEDURE DATE:  03/04/2025          INTERPRETATION:  CLINICAL INFORMATION: Status post fall.    TECHNIQUE: CT of the pelvis was performed in bone and soft tissue windows   with coronal and sagittal reformats. No intravenous contrast was   administered.    COMPARISON: CT of the abdomen and pelvis from 3/2/2025.    FINDINGS:    Bone: No fracture or dislocation. Bone island in the superior right   acetabulum. Degenerative changes of the visualized lower lumbar spine,   sacroiliac joints and pubic symphysis.    Soft tissues: Scattered colonic diverticulosis without diverticulitis.   Atrophic uterus. No adnexal masses. Distended urinary bladder.   Redemonstrated mild subcutaneous inflammatory change in the pelvis.    IMPRESSION:  No fracture or dislocation of the pelvis or either hip.    --- End of Report ---            EYAD LEES MD; Attending Radiologist  This document has been electronically signed. Mar  4 2025  1:19AM    < end of copied text >  < from: CT Head No Cont (03.02.25 @ 23:06) >    ACC: [id number] EXAM:  CT CERVICAL SPINE   ORDERED BY: SHAHBAZ GONZALES     ACC: [id number] EXAM:  CT BRAIN   ORDERED BY: SHAHBAZ GONZALES     PROCEDURE DATE:  03/02/2025          INTERPRETATION:  CLINICAL INFORMATION: Trauma. Status post fall.    COMPARISON: Brain MRI 11/19/2024    CONTRAST:  IV Contrast: NONE  .  TECHNIQUE: A noncontrast head CT and a noncontrast cervical spine CT were   performed. The head CT was performed with contiguous 5 mm transaxial   images from the skull base to vertex. Images were reviewed in brain,   stroke, subdural and bone windows.  The cervical spine CT was performed with transaxial thin section images   from the occiput to the T3 level.  Coronal and sagittal reformatted   images were created from the transaxial source data.  Images were   reviewed in bone and soft tissue windows.    FINDINGS:  CT head:  INTRA-AXIAL: No acute hemorrhage, vasogenic edema or signs for acute   territorial infarct. Stable patchy areas of low-attenuation in the   bihemispheric white matter, corresponding to T2/flair hyperintensities on   the brain MRI, nonspecific.  EXTRA-AXIAL: No mass or collection. Basal cisterns are clear.  VENTRICLES: Stable size and configuration demonstrating no cerebral   volume loss. No midline shift.  VISUALIZED PARANASAL SINUSES: Visualized maxillary sinus and posterior   ethmoid air cells are opacified.  MASTOID AIR CELLS: Clear.  CALVARIUM: Intact.  SOFT TISSUES: Unremarkable.  ORBITS: Bilateral cataract surgery.  MISCELLANEOUS: None.    CT cervical spine:  OSSEOUS STRUCTURES: No acute fracture. Vertebral body heights are   maintained. No suspicious osteoblastic or lytic lesion.  ALIGNMENT: Mild straightening of the normal cervical lordosis with slight   anterior listhesis of C3 on C4.  INVERTEBRAL DISCS: Multilevel intervertebral disc space narrowing. No   advanced endplate erosive changes or large disc herniation. Mild disc   osteophytic ridging from C3/C4 through C5/C6. Multilevel facet   arthropathy.  SPINAL CANAL: Congenitally normal. No evidence for epidural hematoma.   Mild to moderate canal and foraminal stenosis C4/C5 and C5/C6.  EXTRASPINAL TISSUES: No prevertebral edema.  MISCELLANEOUS: Mild centrilobular emphysema seen in the upper lobes.    IMPRESSION:  Head CT: No acute hemorrhage, extra-axial collection, vasogenic edema or   calvarial fracture. Mild chronic microvascular changes.    Cervical spine CT: No acute cervical spine fracture or traumatic   malalignment. Degenerative changes most notable C4/C5 and C5/C6.    --- End of Report ---            ROWDY BLAKE MD; Attending Radiologist  This document has been electronically signed. Mar  3 2025 12:33AM    < end of copied text >  < from: CT Abdomen and Pelvis No Cont (03.02.25 @ 18:42) >    ACC: [id number] EXAM:  CT ABDOMEN AND PELVIS   ORDERED BY: BERENICE MEDINA     PROCEDURE DATE:  03/02/2025          INTERPRETATION:  CLINICAL INFORMATION: Diarrhea. History of thyroid   cancer and melanoma.    COMPARISON: CT: Sonography 3/31/2021. PET/CT 10/8/2024.    CONTRAST/COMPLICATIONS:  IV Contrast: NONE  Oral Contrast: NONE  .    PROCEDURE:  CT of the Abdomen and Pelvis was performed.  Sagittal and coronal reformats were performed.    FINDINGS:  LOWER CHEST: Coronary artery and thoracic aortic calcifications.  Stable pulmonary nodules. The largest one in the left lower lobe   measuring 1.0 cm (3/5).    LIVER: Steatosis. Few subcentimeter hypodense foci, too small to   characterize.  BILE DUCTS: Mild left hepatic lobe intrahepatic biliary dilatation.  GALLBLADDER: Distended with sludge.  SPLEEN: Within normal limits.  PANCREAS: Within normal limits.  ADRENALS: Within normal limits.  KIDNEYS/URETERS: Within normal limits.    BLADDER: Within normal limits.  REPRODUCTIVE ORGANS: Uterus and adnexa within normal limits.    BOWEL: No bowel obstruction. Colonic diverticulosis. Short segment mural   thickening of the proximal to mid sigmoid colon. Long segment mild mural   thickening of the descending colon with trace pericholecystic fat   stranding.  PERITONEUM/RETROPERITONEUM: Within normal limits.  VESSELS: Atherosclerotic changes.  LYMPH NODES: No lymphadenopathy.  ABDOMINAL WALL: Within normal limits.  BONES: Degenerative changes. Grade 1 anterolisthesis of L5 on S1. Mild   compression deformity of L1.    IMPRESSION:  Mild descending colitis.  Short segment mural thickening of the proximal to mid sigmoid colon.   Differentials include decompression versus sequelae of chronic    d. Underlying lesion cannot be ruled out.        --- End of Report ---            OTILIO DUKES MD; Attending Radiologist  This document has been electronically signed. Mar  2 2025  7:26PM    < end of copied text >    RECENT CULTURES:  02-25 @ 23:52 Clean Catch Clean Catch (Midstream)     <10,000 CFU/mL Normal Urogenital Mary   HPI:  84 year old female wih known hx of bullous pemphigoid, dementia and newly diagnosed melanoma under treatment with Dr villa presents with diarrhea- she is a poor historian 0 discussed with ED Oncology and Gi and ID  plan to treat with abx and discharge to facility - and out patient Gi Follow up   patient is unable to give hx - states " i live ere" denied any complaints including diarrhea CP fever chills- doesn't know her melanoma hx either  (04 Mar 2025 08:24)    84 year old female wih PMH of newly diagnosed melanoma on Ipi/ Nivo last cycle 2/3/25, bullous pemphigoid on Dupixent, hypothyroidism, dementia presents with diarrhea. Found to have colitis. Palliative consulted for help with GOC and support in the setting of complex medical issues.      PERTINENT PMH REVIEWED: Yes    PAST MEDICAL & SURGICAL HISTORY:  Hypothyroid  Dementia  Melanoma  Hypertension  No significant past surgical history    SOCIAL HISTORY:  Per chart review                    Substance history: None                    Admitted from:  Independent living facility                    Cheondoism/spirituality: No affiliation                    Cultural concerns: None                      Surrogate/HCP/Guardian: HCP brother Anthony Labadie, Phone#: [phone]    FAMILY HISTORY:  NA    Allergies  Omnipaque 350 (Anaphylaxis)  penicillin (Unknown)  Intolerances    ADVANCE DIRECTIVES/TREATMENT PREFERENCES:  Full code    Baseline ADLs (prior to admission):  Independent     Karnofsky/Palliative Performance Status Version 2:  40%  http://npcrc.org/files/news/palliative_performance_scale_ppsv2.pdf    Present Symptoms:   Dyspnea: No  Nausea/Vomiting: No  Anxiety:  No  Depression: No  Fatigue: No  Loss of appetite: No  Constipation: No  Pain: No            Character-            Duration-            Effect-            Factors-            Frequency-            Location-            Severity-    Pain AD Score:  [email or web address]/cog/[email or web address] (press ctrl + left click to view)    Review of Systems: Reviewed  All others negative    MEDICATIONS  (STANDING):  amLODIPine   Tablet 5 milliGRAM(s) Oral daily  ammonium lactate 12% Lotion 1 Application(s) Topical two times a day  atorvastatin 10 milliGRAM(s) Oral at bedtime  ciprofloxacin     Tablet 500 milliGRAM(s) Oral every 12 hours  clobetasol 0.05% Ointment 1 Application(s) Topical two times a day  enoxaparin Injectable 40 milliGRAM(s) SubCutaneous every 24 hours  levothyroxine 75 MICROGram(s) Oral daily  memantine 10 milliGRAM(s) Oral two times a day  metroNIDAZOLE    Tablet 500 milliGRAM(s) Oral every 12 hours  mupirocin 2% Ointment 1 Application(s) Topical two times a day  sertraline 50 milliGRAM(s) Oral daily    MEDICATIONS  (PRN):      PHYSICAL EXAM:    Vital Signs Last 24 Hrs  T(C): 36.5 (04 Mar 2025 11:01), Max: 36.7 (03 Mar 2025 15:34)  T(F): 97.7 (04 Mar 2025 11:01), Max: 98 (03 Mar 2025 15:34)  HR: 81 (04 Mar 2025 11:01) (72 - 82)  BP: 105/68 (04 Mar 2025 11:01) (105/68 - 120/55)  BP(mean): 77 (04 Mar 2025 07:27) (77 - 87)  RR: 18 (04 Mar 2025 11:01) (18 - 18)  SpO2: 98% (04 Mar 2025 11:01) (94% - 100%)    Parameters below as of 04 Mar 2025 11:01  Patient On (Oxygen Delivery Method): room air        General: alert  oriented x2, confused     HEENT: Dry mouth    Lungs: comfortable nonlabored breathing    CV: normal      GI: normal  nondistended  nontender     : normal     MSK: weakness      Neuro: no focal deficits, mild cognitive impairment, unable to tell why she is at the hospital      Skin: normal     LABS:                        10.7   15.01 )-----------( 367      ( 04 Mar 2025 04:16 )             34.6     03-04    137  |  105  |  23.6[H]  ----------------------------<  73  3.6   |  19.0[L]  |  0.62    Ca    7.5[L]      04 Mar 2025 04:16    TPro  4.7[L]  /  Alb  2.2[L]  /  TBili  0.4  /  DBili  x   /  AST  37[H]  /  ALT  26  /  AlkPhos  158[H]  03-04      Urinalysis Basic - ( 04 Mar 2025 04:16 )    Color: x / Appearance: x / SG: x / pH: x  Gluc: 73 mg/dL / Ketone: x  / Bili: x / Urobili: x   Blood: x / Protein: x / Nitrite: x   Leuk Esterase: x / RBC: x / WBC x   Sq Epi: x / Non Sq Epi: x / Bacteria: x      I&O's Summary      RADIOLOGY & ADDITIONAL STUDIES:  < from: CT Pelvis Bony Only No Cont (03.04.25 @ 01:01) >    ACC: [id number] EXAM:  CT PELVIS BONY ONLY   ORDERED BY: CHANDNI VELASCO     PROCEDURE DATE:  03/04/2025          INTERPRETATION:  CLINICAL INFORMATION: Status post fall.    TECHNIQUE: CT of the pelvis was performed in bone and soft tissue windows   with coronal and sagittal reformats. No intravenous contrast was   administered.    COMPARISON: CT of the abdomen and pelvis from 3/2/2025.    FINDINGS:    Bone: No fracture or dislocation. Bone island in the superior right   acetabulum. Degenerative changes of the visualized lower lumbar spine,   sacroiliac joints and pubic symphysis.    Soft tissues: Scattered colonic diverticulosis without diverticulitis.   Atrophic uterus. No adnexal masses. Distended urinary bladder.   Redemonstrated mild subcutaneous inflammatory change in the pelvis.    IMPRESSION:  No fracture or dislocation of the pelvis or either hip.    --- End of Report ---            EYAD LEES MD; Attending Radiologist  This document has been electronically signed. Mar  4 2025  1:19AM    < end of copied text >  < from: CT Head No Cont (03.02.25 @ 23:06) >    ACC: [id number] EXAM:  CT CERVICAL SPINE   ORDERED BY: SHAHBAZ GONZALES     ACC: [id number] EXAM:  CT BRAIN   ORDERED BY: SHAHBAZ GONZALES     PROCEDURE DATE:  03/02/2025          INTERPRETATION:  CLINICAL INFORMATION: Trauma. Status post fall.    COMPARISON: Brain MRI 11/19/2024    CONTRAST:  IV Contrast: NONE  .  TECHNIQUE: A noncontrast head CT and a noncontrast cervical spine CT were   performed. The head CT was performed with contiguous 5 mm transaxial   images from the skull base to vertex. Images were reviewed in brain,   stroke, subdural and bone windows.  The cervical spine CT was performed with transaxial thin section images   from the occiput to the T3 level.  Coronal and sagittal reformatted   images were created from the transaxial source data.  Images were   reviewed in bone and soft tissue windows.    FINDINGS:  CT head:  INTRA-AXIAL: No acute hemorrhage, vasogenic edema or signs for acute   territorial infarct. Stable patchy areas of low-attenuation in the   bihemispheric white matter, corresponding to T2/flair hyperintensities on   the brain MRI, nonspecific.  EXTRA-AXIAL: No mass or collection. Basal cisterns are clear.  VENTRICLES: Stable size and configuration demonstrating no cerebral   volume loss. No midline shift.  VISUALIZED PARANASAL SINUSES: Visualized maxillary sinus and posterior   ethmoid air cells are opacified.  MASTOID AIR CELLS: Clear.  CALVARIUM: Intact.  SOFT TISSUES: Unremarkable.  ORBITS: Bilateral cataract surgery.  MISCELLANEOUS: None.    CT cervical spine:  OSSEOUS STRUCTURES: No acute fracture. Vertebral body heights are   maintained. No suspicious osteoblastic or lytic lesion.  ALIGNMENT: Mild straightening of the normal cervical lordosis with slight   anterior listhesis of C3 on C4.  INVERTEBRAL DISCS: Multilevel intervertebral disc space narrowing. No   advanced endplate erosive changes or large disc herniation. Mild disc   osteophytic ridging from C3/C4 through C5/C6. Multilevel facet   arthropathy.  SPINAL CANAL: Congenitally normal. No evidence for epidural hematoma.   Mild to moderate canal and foraminal stenosis C4/C5 and C5/C6.  EXTRASPINAL TISSUES: No prevertebral edema.  MISCELLANEOUS: Mild centrilobular emphysema seen in the upper lobes.    IMPRESSION:  Head CT: No acute hemorrhage, extra-axial collection, vasogenic edema or   calvarial fracture. Mild chronic microvascular changes.    Cervical spine CT: No acute cervical spine fracture or traumatic   malalignment. Degenerative changes most notable C4/C5 and C5/C6.    --- End of Report ---            ROWDY BLAKE MD; Attending Radiologist  This document has been electronically signed. Mar  3 2025 12:33AM    < end of copied text >  < from: CT Abdomen and Pelvis No Cont (03.02.25 @ 18:42) >    ACC: [id number] EXAM:  CT ABDOMEN AND PELVIS   ORDERED BY: BERENICE MEDINA     PROCEDURE DATE:  03/02/2025          INTERPRETATION:  CLINICAL INFORMATION: Diarrhea. History of thyroid   cancer and melanoma.    COMPARISON: CT: Sonography 3/31/2021. PET/CT 10/8/2024.    CONTRAST/COMPLICATIONS:  IV Contrast: NONE  Oral Contrast: NONE  .    PROCEDURE:  CT of the Abdomen and Pelvis was performed.  Sagittal and coronal reformats were performed.    FINDINGS:  LOWER CHEST: Coronary artery and thoracic aortic calcifications.  Stable pulmonary nodules. The largest one in the left lower lobe   measuring 1.0 cm (3/5).    LIVER: Steatosis. Few subcentimeter hypodense foci, too small to   characterize.  BILE DUCTS: Mild left hepatic lobe intrahepatic biliary dilatation.  GALLBLADDER: Distended with sludge.  SPLEEN: Within normal limits.  PANCREAS: Within normal limits.  ADRENALS: Within normal limits.  KIDNEYS/URETERS: Within normal limits.    BLADDER: Within normal limits.  REPRODUCTIVE ORGANS: Uterus and adnexa within normal limits.    BOWEL: No bowel obstruction. Colonic diverticulosis. Short segment mural   thickening of the proximal to mid sigmoid colon. Long segment mild mural   thickening of the descending colon with trace pericholecystic fat   stranding.  PERITONEUM/RETROPERITONEUM: Within normal limits.  VESSELS: Atherosclerotic changes.  LYMPH NODES: No lymphadenopathy.  ABDOMINAL WALL: Within normal limits.  BONES: Degenerative changes. Grade 1 anterolisthesis of L5 on S1. Mild   compression deformity of L1.    IMPRESSION:  Mild descending colitis.  Short segment mural thickening of the proximal to mid sigmoid colon.   Differentials include decompression versus sequelae of chronic    d. Underlying lesion cannot be ruled out.      --- End of Report ---      OTILIO DUKES MD; Attending Radiologist  This document has been electronically signed. Mar  2 2025  7:26PM    < end of copied text >    RECENT CULTURES:  02-25 @ 23:52 Clean Catch Clean Catch (Midstream)     <10,000 CFU/mL Normal Urogenital Mary

## 2025-03-04 NOTE — PROGRESS NOTE ADULT - ASSESSMENT
84 year old female with known medical hx of htn, hypothyroidism ,depression and dementia  and Bullous pemphigoid (on prednisone ), melanoma under treatment by Dr Odom - presented 2/28 for diarrheaAND BD PAIN   CT  SCAN OF ABD PELVIS SHOWED  COLTIS WITH ? Diverticulitis   PT WITH LEUKOCYTOSIS  PT UNABLE TO GIVE A GOOD HISTORY SOME MILD DEMENTIA  PT NON TOXIC   ABD EXAM WITH SOME TENDERNESS  NO REBOUND OR GUARDING   UNCLEAR PCN ALLERGY  WILL NEED TO ASK FAMILY ABOUT ALLERGY  CAN CONTINUE CIPRO/FLAGYL FOR NOW  WILL DW/ ONCOLOGY

## 2025-03-04 NOTE — CONSULT NOTE ADULT - CONSULT REQUESTED BY NAME
DR DUARTE
Tonya
We are seeing this patient after being identified by the emergency department  as a chronic illness patient who is high risk for deterioration and/or readmission.

## 2025-03-04 NOTE — PROGRESS NOTE ADULT - SUBJECTIVE AND OBJECTIVE BOX
INFECTIOUS DISEASES AND INTERNAL MEDICINE at [place]  =======================================================  Archie Egan MD  Diplomates American Board of Internal Medicine and Infectious Diseases  Telephone [phone]  Fax            [phone]  =======================================================    DEJAN CONTRERAS [id number]    Follow up: colitis diverticulitis      Allergies:  Omnipaque 350 (Anaphylaxis)  penicillin (Unknown)      Medications:  amLODIPine   Tablet 5 milliGRAM(s) Oral daily  ammonium lactate 12% Lotion 1 Application(s) Topical two times a day  atorvastatin 10 milliGRAM(s) Oral at bedtime  ciprofloxacin     Tablet 500 milliGRAM(s) Oral every 12 hours  clobetasol 0.05% Ointment 1 Application(s) Topical two times a day  enoxaparin Injectable 40 milliGRAM(s) SubCutaneous every 24 hours  lactated ringers. 1000 milliLiter(s) IV Continuous <Continuous>  levothyroxine 75 MICROGram(s) Oral daily  memantine 10 milliGRAM(s) Oral two times a day  metroNIDAZOLE    Tablet 500 milliGRAM(s) Oral every 12 hours  mupirocin 2% Ointment 1 Application(s) Topical two times a day  sertraline 50 milliGRAM(s) Oral daily    SOCIAL       FAMILY   FAMILY HISTORY:    REVIEW OF SYSTEMS:  CONSTITUTIONAL:  No Fever or chills  HEENT:   No diplopia or blurred vision.  No earache, sore throat or runny nose.  CARDIOVASCULAR:  No pressure, squeezing, strangling, tightness, heaviness or aching about the chest, neck, axilla or epigastrium.  RESPIRATORY:  No cough, shortness of breath, PND or orthopnea.  GASTROINTESTINAL:  No nausea, vomiting or diarrhea.  GENITOURINARY:  No dysuria, frequency or urgency. No Blood in urine  MUSCULOSKELETAL:   moves all joints  SKIN:  No change in skin, hair or nails.  NEUROLOGIC:  No paresthesias, fasciculations, seizures or weakness.  PSYCHIATRIC:  No disorder of thought or mood.  ENDOCRINE:  No heat or cold intolerance, polyuria or polydipsia.  HEMATOLOGICAL:  No easy bruising or bleeding.            Physical Exam:  ICU Vital Signs Last 24 Hrs  T(C): 36.5 (04 Mar 2025 11:01), Max: 36.7 (03 Mar 2025 15:34)  T(F): 97.7 (04 Mar 2025 11:01), Max: 98 (03 Mar 2025 15:34)  HR: 81 (04 Mar 2025 11:01) (72 - 82)  BP: 105/68 (04 Mar 2025 11:01) (105/68 - 120/55)  BP(mean): 77 (04 Mar 2025 07:27) (77 - 87)  ABP: --  ABP(mean): --  RR: 18 (04 Mar 2025 11:01) (18 - 18)  SpO2: 98% (04 Mar 2025 11:01) (94% - 100%)    O2 Parameters below as of 04 Mar 2025 11:01  Patient On (Oxygen Delivery Method): room air          GEN: NAD,   HEENT: normocephalic and atraumatic. EOMI. ALEIDA.    NECK: Supple. No carotid bruits.  No lymphadenopathy or thyromegaly.  LUNGS: Clear to auscultation.  HEART: Regular rate and rhythm without murmur.  ABDOMEN: Soft, midl diffuse tenderness  Positive bowel sounds.    : No CVA tenderness  EXTREMITIES: Without any cyanosis, clubbing, rash, lesions or edema.  MSK: no joint swelling  NEUROLOGIC: Cranial nerves II through XII are grossly intact.  PSYCHIATRIC: Appropriate affect .  SKIN: No ulceration or induration present.        Labs:  Vitals:  ============  T(F): 97.7 (04 Mar 2025 11:01), Max: 98 (03 Mar 2025 15:34)  HR: 81 (04 Mar 2025 11:01)  BP: 105/68 (04 Mar 2025 11:01)  RR: 18 (04 Mar 2025 11:01)  SpO2: 98% (04 Mar 2025 11:01) (94% - 100%)  temp max in last 48H T(F): , Max: 98.3 (03-02-25 @ 22:21)    =======================================================  Current Antibiotics:  ciprofloxacin     Tablet 500 milliGRAM(s) Oral every 12 hours  metroNIDAZOLE    Tablet 500 milliGRAM(s) Oral every 12 hours    Other medications:  amLODIPine   Tablet 5 milliGRAM(s) Oral daily  ammonium lactate 12% Lotion 1 Application(s) Topical two times a day  atorvastatin 10 milliGRAM(s) Oral at bedtime  clobetasol 0.05% Ointment 1 Application(s) Topical two times a day  enoxaparin Injectable 40 milliGRAM(s) SubCutaneous every 24 hours  lactated ringers. 1000 milliLiter(s) IV Continuous <Continuous>  levothyroxine 75 MICROGram(s) Oral daily  memantine 10 milliGRAM(s) Oral two times a day  mupirocin 2% Ointment 1 Application(s) Topical two times a day  sertraline 50 milliGRAM(s) Oral daily      =======================================================  Labs:                        10.7   15.01 )-----------( 367      ( 04 Mar 2025 04:16 )             34.6     03-04    137  |  105  |  23.6[H]  ----------------------------<  73  3.6   |  19.0[L]  |  0.62    Ca    7.5[L]      04 Mar 2025 04:16    TPro  4.7[L]  /  Alb  2.2[L]  /  TBili  0.4  /  DBili  x   /  AST  37[H]  /  ALT  26  /  AlkPhos  158[H]  03-04      Culture - Urine (collected 02-25-25 @ 23:52)  Source: Clean Catch Clean Catch (Midstream)  Final Report (02-27-25 @ 10:57):    <10,000 CFU/mL Normal Urogenital Mary    Culture - Wound Aerobic (collected 12-05-24 @ 09:56)  Source: Skin/Wound  Final Report (12-08-24 @ 07:51):    Rare Staphylococcus aureus  Organism: Staphylococcus aureus (12-08-24 @ 07:51)  Organism: Staphylococcus aureus (12-08-24 @ 07:51)    Sensitivities:      Method Type: ALISA      -  Clindamycin: R <=0.25 This isolate is presumed to be clindamycin resistant based on detection of inducible resistance. Clindamycin may still be effective in some patients.      -  Erythromycin: R >4      -  Gentamicin: S <=4 Should not be used as monotherapy      -  Oxacillin: S <=0.25 Oxacillin predicts susceptibility for dicloxacillin, methicillin, and nafcillin      -  Penicillin: R >2      -  Rifampin: S <=1 Should not be used as monotherapy      -  Tetracycline: S <=4      -  Trimethoprim/Sulfamethoxazole: S <=0.5/9.5      -  Vancomycin: S 1      Creatinine: 0.62 mg/dL (03-04-25 @ 04:16)  Creatinine: 0.75 mg/dL (03-03-25 @ 07:12)  Creatinine: 0.83 mg/dL (03-01-25 @ 09:11)  Creatinine: 0.95 mg/dL (02-28-25 @ 15:51)            WBC Count: 15.01 K/uL (03-04-25 @ 04:16)  WBC Count: 10.60 K/uL (03-03-25 @ 07:12)  WBC Count: 16.59 K/uL (03-01-25 @ 09:11)  WBC Count: 18.43 K/uL (02-28-25 @ 15:51)    SARS-CoV-2 Result: NotDetec (02-25-25 @ 21:11)      Alkaline Phosphatase: 158 U/L (03-04-25 @ 04:16)  Alkaline Phosphatase: 151 U/L (03-03-25 @ 07:12)  Alkaline Phosphatase: 173 U/L (03-01-25 @ 09:11)  Alkaline Phosphatase: 178 U/L (02-28-25 @ 15:51)  Alanine Aminotransferase (ALT/SGPT): 26 U/L (03-04-25 @ 04:16)  Alanine Aminotransferase (ALT/SGPT): 23 U/L (03-03-25 @ 07:12)  Alanine Aminotransferase (ALT/SGPT): 24 U/L (03-01-25 @ 09:11)  Alanine Aminotransferase (ALT/SGPT): 24 U/L (02-28-25 @ 15:51)  Aspartate Aminotransferase (AST/SGOT): 37 U/L (03-04-25 @ 04:16)  Aspartate Aminotransferase (AST/SGOT): 31 U/L (03-03-25 @ 07:12)  Aspartate Aminotransferase (AST/SGOT): 30 U/L (03-01-25 @ 09:11)  Aspartate Aminotransferase (AST/SGOT): 28 U/L (02-28-25 @ 15:51)  Bilirubin Total: 0.4 mg/dL (03-04-25 @ 04:16)  Bilirubin Total: 0.3 mg/dL (03-03-25 @ 07:12)  Bilirubin Total: 0.3 mg/dL (03-01-25 @ 09:11)  Bilirubin Total: 0.3 mg/dL (02-28-25 @ 15:51)

## 2025-03-04 NOTE — CONSULT NOTE ADULT - ATTENDING COMMENTS
84F with metastatic melanoma on active treatment, bullous pemphigoid, mild-moderate dementia, admitted with colitis.   - spoke with Dr. Odom, patient has been responsive to active treatments, unfortunately now with some declining functionality, and potentially may improve and be able to receive more treatment.   - HCP is her adopted brother Leonard  - Will touch base with Leonard to prepare for possibility that she may deteriorate or worsen at rehab, and if she is not a candidate for further candidate, she would be a candidate for hospice.  - Will also ask adopted Brother Leonard and HCP, wishes regarding advanced directives.

## 2025-03-04 NOTE — H&P ADULT - HISTORY OF PRESENT ILLNESS
84 year old female wih known hx of bullous pemphigoid, dementia and newly diagnosed melanoma under treatment with Dr villa presents with diarrhea- she is a poor historian 0 discussed with ED Oncology and Gi and ID  plan to treat with abx and discharge to facility - and out patient Gi Follow up   patient is unable to give hx - states " i live ere" denied any complaints including diarrhea CP fever chills- doesn't know her melanoma hx either

## 2025-03-04 NOTE — PROGRESS NOTE ADULT - ASSESSMENT
84 yr old female with PMH of early dementia, HTN, HLD, Thyroid cancer, seeing Dermatology Dr. Mcnair for bullous pemphigoid, competed prednisone taper, on Dupixent every 2 weeks, with recently diagnosed metastatic melanoma started on Ipi/ Nivo since 11/26/25 x 3 cycles, last on 2/3/25 who was recently in the ED for fatigue/diarrhea  Now Brought back in to the er with weakness    # Metastatic Melanoma   # Poor functional status/ Poor PO intake     s/p IVF  diarrhea appears to have resolved  PT evaluation  Pending outpatient PET CT for restaging & further treatment plan      IN PROGRESS 84 yr old female with PMH of early dementia, HTN, HLD, Thyroid cancer, seeing Dermatology Dr. Mcnair for bullous pemphigoid, competed prednisone taper, on Dupixent every 2 weeks, with recently diagnosed metastatic melanoma started on Ipi/ Nivo since 11/26/25 x 3 cycles, last on 2/3/25 who was recently in the ED for fatigue/diarrhea  Now Brought back in to the er with weakness    # Metastatic Melanoma   # Poor functional status/ Poor PO intake       Diarrhea appears to have resolved  Pending outpatient PET CT for restaging & further treatment plan pending her functional recovery  Pending discharge to [facility]

## 2025-03-04 NOTE — PROGRESS NOTE ADULT - SUBJECTIVE AND OBJECTIVE BOX
Cris Morales M.D.    Patient is a 84y old  Female who presents with a chief complaint of colitis (04 Mar 2025 08:24)      SUBJECTIVE / OVERNIGHT EVENTS: no event overnight. Feels well. Denies any further diarrhea.     Patient denies chest pain, SOB, abd pain, N/V, fever, chills, dysuria or any other complaints. All remainder ROS negative.     MEDICATIONS  (STANDING):  amLODIPine   Tablet 5 milliGRAM(s) Oral daily  ammonium lactate 12% Lotion 1 Application(s) Topical two times a day  atorvastatin 10 milliGRAM(s) Oral at bedtime  ciprofloxacin     Tablet 500 milliGRAM(s) Oral every 12 hours  clobetasol 0.05% Ointment 1 Application(s) Topical two times a day  enoxaparin Injectable 40 milliGRAM(s) SubCutaneous every 24 hours  lactated ringers. 1000 milliLiter(s) (75 mL/Hr) IV Continuous <Continuous>  levothyroxine 75 MICROGram(s) Oral daily  memantine 10 milliGRAM(s) Oral two times a day  metroNIDAZOLE    Tablet 500 milliGRAM(s) Oral every 12 hours  mupirocin 2% Ointment 1 Application(s) Topical two times a day  sertraline 50 milliGRAM(s) Oral daily    MEDICATIONS  (PRN):      I&O's Summary      PHYSICAL EXAM:  Vital Signs Last 24 Hrs  T(C): 36.5 (04 Mar 2025 11:01), Max: 36.7 (03 Mar 2025 11:22)  T(F): 97.7 (04 Mar 2025 11:01), Max: 98 (03 Mar 2025 11:22)  HR: 81 (04 Mar 2025 11:01) (72 - 82)  BP: 105/68 (04 Mar 2025 11:01) (100/50 - 120/55)  BP(mean): 77 (04 Mar 2025 07:27) (77 - 87)  RR: 18 (04 Mar 2025 11:01) (18 - 18)  SpO2: 98% (04 Mar 2025 11:01) (94% - 100%)    Parameters below as of 04 Mar 2025 11:01  Patient On (Oxygen Delivery Method): room air    CONSTITUTIONAL: NAD, well-groomed  RESPIRATORY: Normal respiratory effort; lungs are clear to auscultation bilaterally  CARDIOVASCULAR: Regular rate and rhythm, no LE edema  ABDOMEN: Nontender to palpation, normoactive bowel sounds  PSYCH: A+O x3; affect appropriate    LABS:                        10.7   15.01 )-----------( 367      ( 04 Mar 2025 04:16 )             34.6     03-04    137  |  105  |  23.6[H]  ----------------------------<  73  3.6   |  19.0[L]  |  0.62    Ca    7.5[L]      04 Mar 2025 04:16    TPro  4.7[L]  /  Alb  2.2[L]  /  TBili  0.4  /  DBili  x   /  AST  37[H]  /  ALT  26  /  AlkPhos  158[H]  03-04          Urinalysis Basic - ( 04 Mar 2025 04:16 )    Color: x / Appearance: x / SG: x / pH: x  Gluc: 73 mg/dL / Ketone: x  / Bili: x / Urobili: x   Blood: x / Protein: x / Nitrite: x   Leuk Esterase: x / RBC: x / WBC x   Sq Epi: x / Non Sq Epi: x / Bacteria: x        CAPILLARY BLOOD GLUCOSE          RADIOLOGY & ADDITIONAL TESTS:  Results Reviewed:   Imaging Personally Reviewed:  Electrocardiogram Personally Reviewed:    < from: CT Abdomen and Pelvis No Cont (03.02.25 @ 18:42) >  IMPRESSION:  Mild descending colitis.  Short segment mural thickening of the proximal to mid sigmoid colon.   Differentials include decompression versus sequelae of chronic   diverticulitis. Underlying lesion cannot be ruled out.    < end of copied text >

## 2025-03-04 NOTE — PROGRESS NOTE ADULT - ASSESSMENT
84 yr old female with PMH of early dementia, HTN, HLD, Thyroid cancer, seeing Dermatology Dr. Mcnair for bullous pemphigoid, competed prednisone taper, on Dupixent every 2 weeks, with recently diagnosed metastatic melanoma started on Ipi/ Nivo since 11/26/25 x 3 cycles, last on 2/3/25 who was recently in the ED for fatigue/diarrhea, noted with colitis on CT.     #Colitis  CT result noted above  84 yr old female with PMH of early dementia, HTN, HLD, Thyroid cancer, seeing Dermatology Dr. Mcnair for bullous pemphigoid, competed prednisone taper, on Dupixent every 2 weeks, with recently diagnosed metastatic melanoma started on Ipi/ Nivo since 11/26/25 x 3 cycles, last on 2/3/25 who was recently in the ED for fatigue/diarrhea, noted with colitis on CT.     #Colitis  CT result noted above   ID recs shira  on empiric Flagyl/Cipro  outpt GI followup    #Dementia  cw home memantine and zoloft    #Leukocytosis  2/2 recent steroid use  abx as above  serial CBC    #Hypothyroidism  cw home synthroid     #HTN  #HLD  cw home Lipitor and Norvasc    DVT ppx: Lovenox QD  Dispo: pending ID recs and PT eval

## 2025-03-04 NOTE — CONSULT NOTE ADULT - CONSULT REASON
Melanoma
COLITIS
We are seeing this patient after being identified by the emergency department  as a chronic illness patient who is high risk for deterioration and/or readmission.

## 2025-03-04 NOTE — PROGRESS NOTE ADULT - SUBJECTIVE AND OBJECTIVE BOX
REASON FOR CONSULTATION:     Interval History:  S/p fall overnight  denies pain  One episode of diarrhea yesterday, none overnight, none today           REVIEW OF SYSTEMS:  Constitutional, Eyes, ENT, Cardiovascular, Respiratory, Gastrointestinal, Genitourinary, Musculoskeletal, Integumentary, Neurological, Psychiatric, Endocrine, Heme/Lymph, and Allergic/Immunologic review of systems are otherwise negative except as noted in the HPI.          MEDICATIONS  (STANDING):  amLODIPine   Tablet 5 milliGRAM(s) Oral daily  ammonium lactate 12% Lotion 1 Application(s) Topical two times a day  atorvastatin 10 milliGRAM(s) Oral at bedtime  ciprofloxacin     Tablet 500 milliGRAM(s) Oral every 12 hours  clobetasol 0.05% Ointment 1 Application(s) Topical two times a day  lactated ringers. 1000 milliLiter(s) (75 mL/Hr) IV Continuous <Continuous>  levothyroxine 50 MICROGram(s) Oral daily  memantine 10 milliGRAM(s) Oral two times a day  metroNIDAZOLE    Tablet 500 milliGRAM(s) Oral every 12 hours  sertraline 50 milliGRAM(s) Oral daily    MEDICATIONS  (PRN):    Vital Signs Last 24 Hrs  T(C): 36.7 (03 Mar 2025 15:34), Max: 36.8 (02 Mar 2025 22:21)  T(F): 98 (03 Mar 2025 15:34), Max: 98.3 (02 Mar 2025 22:21)  HR: 72 (03 Mar 2025 15:34) (68 - 82)  BP: 118/67 (03 Mar 2025 15:34) (100/50 - 123/72)  BP(mean): --  RR: 18 (03 Mar 2025 15:34) (18 - 18)  SpO2: 98% (03 Mar 2025 15:34) (94% - 98%)    Parameters below as of 03 Mar 2025 15:34  Patient On (Oxygen Delivery Method): room air        PHYSICAL EXAM:    Awake alert   [id number]  RS B/l BS  aBd SOft non tender  ext; no pedal edema      LABS:                        9.7    10.60 )-----------( 325      ( 03 Mar 2025 07:12 )             30.7     03-03    135  |  105  |  29.6[H]  ----------------------------<  78  3.5   |  19.0[L]  |  0.75    Ca    7.4[L]      03 Mar 2025 07:12    TPro  4.4[L]  /  Alb  2.0[L]  /  TBili  0.3[L]  /  DBili  x   /  AST  31  /  ALT  23  /  AlkPhos  151[H]  03-03                  RADIOLOGY & ADDITIONAL STUDIES:  < from: CT Abdomen and Pelvis No Cont (03.02.25 @ 18:42) >  IMPRESSION:  Mild descending colitis.  Short segment mural thickening of the proximal to mid sigmoid colon.   Differentials include decompression versus sequelae of chronic   diverticulitis. Underlying lesion cannot be ruled out.    < end of copied text >         REASON FOR CONSULTATION:     Interval History:  denies pain  no diarrhea overnight or today           REVIEW OF SYSTEMS:  Constitutional, Eyes, ENT, Cardiovascular, Respiratory, Gastrointestinal, Genitourinary, Musculoskeletal, Integumentary, Neurological, Psychiatric, Endocrine, Heme/Lymph, and Allergic/Immunologic review of systems are otherwise negative except as noted in the HPI.          MEDICATIONS  (STANDING):  amLODIPine   Tablet 5 milliGRAM(s) Oral daily  ammonium lactate 12% Lotion 1 Application(s) Topical two times a day  atorvastatin 10 milliGRAM(s) Oral at bedtime  ciprofloxacin     Tablet 500 milliGRAM(s) Oral every 12 hours  clobetasol 0.05% Ointment 1 Application(s) Topical two times a day  enoxaparin Injectable 40 milliGRAM(s) SubCutaneous every 24 hours  levothyroxine 75 MICROGram(s) Oral daily  memantine 10 milliGRAM(s) Oral two times a day  metroNIDAZOLE    Tablet 500 milliGRAM(s) Oral every 12 hours  mupirocin 2% Ointment 1 Application(s) Topical two times a day  sertraline 50 milliGRAM(s) Oral daily    MEDICATIONS  (PRN):      Vital Signs Last 24 Hrs  T(C): 36.5 (04 Mar 2025 11:01), Max: 36.7 (03 Mar 2025 15:34)  T(F): 97.7 (04 Mar 2025 11:01), Max: 98 (03 Mar 2025 15:34)  HR: 81 (04 Mar 2025 11:01) (72 - 82)  BP: 105/68 (04 Mar 2025 11:01) (105/68 - 120/55)  BP(mean): 77 (04 Mar 2025 07:27) (77 - 87)  RR: 18 (04 Mar 2025 11:01) (18 - 18)  SpO2: 98% (04 Mar 2025 11:01) (94% - 100%)    Parameters below as of 04 Mar 2025 11:01  Patient On (Oxygen Delivery Method): room air          PHYSICAL EXAM:    Awake alert   [id number]  RS B/l BS  aBd SOft non tender  ext; no pedal edema      LABS:                                   10.7   15.01 )-----------( 367      ( 04 Mar 2025 04:16 )             34.6     03-04    137  |  105  |  23.6[H]  ----------------------------<  73  3.6   |  19.0[L]  |  0.62    Ca    7.5[L]      04 Mar 2025 04:16    TPro  4.7[L]  /  Alb  2.2[L]  /  TBili  0.4  /  DBili  x   /  AST  37[H]  /  ALT  26  /  AlkPhos  158[H]  03-04                  RADIOLOGY & ADDITIONAL STUDIES:  < from: CT Abdomen and Pelvis No Cont (03.02.25 @ 18:42) >  IMPRESSION:  Mild descending colitis.  Short segment mural thickening of the proximal to mid sigmoid colon.   Differentials include decompression versus sequelae of chronic   diverticulitis. Underlying lesion cannot be ruled out.    < end of copied text >

## 2025-03-05 DIAGNOSIS — C43.9 MALIGNANT MELANOMA OF SKIN, UNSPECIFIED: ICD-10-CM

## 2025-03-05 DIAGNOSIS — Z51.5 ENCOUNTER FOR PALLIATIVE CARE: ICD-10-CM

## 2025-03-05 DIAGNOSIS — A09 INFECTIOUS GASTROENTERITIS AND COLITIS, UNSPECIFIED: ICD-10-CM

## 2025-03-05 LAB
ANION GAP SERPL CALC-SCNC: 13 MMOL/L — SIGNIFICANT CHANGE UP (ref 5–17)
APPEARANCE UR: CLEAR — SIGNIFICANT CHANGE UP
BACTERIA # UR AUTO: NEGATIVE /HPF — SIGNIFICANT CHANGE UP
BILIRUB UR-MCNC: ABNORMAL
BUN SERPL-MCNC: 24.1 MG/DL — HIGH (ref 8–20)
CALCIUM SERPL-MCNC: 7.6 MG/DL — LOW (ref 8.4–10.5)
CAST: 2 /LPF — SIGNIFICANT CHANGE UP (ref 0–4)
CHLORIDE SERPL-SCNC: 103 MMOL/L — SIGNIFICANT CHANGE UP (ref 96–108)
CO2 SERPL-SCNC: 19 MMOL/L — LOW (ref 22–29)
COLOR SPEC: SIGNIFICANT CHANGE UP
CREAT SERPL-MCNC: 0.8 MG/DL — SIGNIFICANT CHANGE UP (ref 0.5–1.3)
DIFF PNL FLD: NEGATIVE — SIGNIFICANT CHANGE UP
EGFR: 72 ML/MIN/1.73M2 — SIGNIFICANT CHANGE UP
EGFR: 72 ML/MIN/1.73M2 — SIGNIFICANT CHANGE UP
GLUCOSE SERPL-MCNC: 127 MG/DL — HIGH (ref 70–99)
GLUCOSE UR QL: NEGATIVE MG/DL — SIGNIFICANT CHANGE UP
HCT VFR BLD CALC: 33.6 % — LOW (ref 34.5–45)
HGB BLD-MCNC: 10.7 G/DL — LOW (ref 11.5–15.5)
KETONES UR-MCNC: NEGATIVE MG/DL — SIGNIFICANT CHANGE UP
LEUKOCYTE ESTERASE UR-ACNC: ABNORMAL
MCHC RBC-ENTMCNC: 24.2 PG — LOW (ref 27–34)
MCHC RBC-ENTMCNC: 31.8 G/DL — LOW (ref 32–36)
MCV RBC AUTO: 76 FL — LOW (ref 80–100)
NITRITE UR-MCNC: NEGATIVE — SIGNIFICANT CHANGE UP
NRBC # BLD AUTO: 0 K/UL — SIGNIFICANT CHANGE UP (ref 0–0)
NRBC # FLD: 0 K/UL — SIGNIFICANT CHANGE UP (ref 0–0)
NRBC BLD AUTO-RTO: 0 /100 WBCS — SIGNIFICANT CHANGE UP (ref 0–0)
PH UR: 6 — SIGNIFICANT CHANGE UP (ref 5–8)
PLATELET # BLD AUTO: 407 K/UL — HIGH (ref 150–400)
PMV BLD: 8.8 FL — SIGNIFICANT CHANGE UP (ref 7–13)
POTASSIUM SERPL-MCNC: 3.6 MMOL/L — SIGNIFICANT CHANGE UP (ref 3.5–5.3)
POTASSIUM SERPL-SCNC: 3.6 MMOL/L — SIGNIFICANT CHANGE UP (ref 3.5–5.3)
PROT UR-MCNC: NEGATIVE MG/DL — SIGNIFICANT CHANGE UP
RBC # BLD: 4.42 M/UL — SIGNIFICANT CHANGE UP (ref 3.8–5.2)
RBC # FLD: 19.4 % — HIGH (ref 10.3–14.5)
RBC CASTS # UR COMP ASSIST: 2 /HPF — SIGNIFICANT CHANGE UP (ref 0–4)
SODIUM SERPL-SCNC: 135 MMOL/L — SIGNIFICANT CHANGE UP (ref 135–145)
SP GR SPEC: 1.02 — SIGNIFICANT CHANGE UP (ref 1–1.03)
SQUAMOUS # UR AUTO: 1 /HPF — SIGNIFICANT CHANGE UP (ref 0–5)
UROBILINOGEN FLD QL: 0.2 MG/DL — SIGNIFICANT CHANGE UP (ref 0.2–1)
WBC # BLD: 13.74 K/UL — HIGH (ref 3.8–10.5)
WBC # FLD AUTO: 13.74 K/UL — HIGH (ref 3.8–10.5)
WBC UR QL: 0 /HPF — SIGNIFICANT CHANGE UP (ref 0–5)

## 2025-03-05 PROCEDURE — 99232 SBSQ HOSP IP/OBS MODERATE 35: CPT

## 2025-03-05 PROCEDURE — 99233 SBSQ HOSP IP/OBS HIGH 50: CPT

## 2025-03-05 PROCEDURE — G0316 PROLONG INPT EVAL ADD15 M: CPT

## 2025-03-05 PROCEDURE — G0545: CPT

## 2025-03-05 RX ORDER — TAMSULOSIN HYDROCHLORIDE 0.4 MG/1
0.4 CAPSULE ORAL DAILY
Refills: 0 | Status: DISCONTINUED | OUTPATIENT
Start: 2025-03-05 | End: 2025-03-07

## 2025-03-05 RX ADMIN — Medication 500 MILLIGRAM(S): at 17:28

## 2025-03-05 RX ADMIN — Medication 1 APPLICATION(S): at 17:23

## 2025-03-05 RX ADMIN — TAMSULOSIN HYDROCHLORIDE 0.4 MILLIGRAM(S): 0.4 CAPSULE ORAL at 14:18

## 2025-03-05 RX ADMIN — ATORVASTATIN CALCIUM 10 MILLIGRAM(S): 80 TABLET, FILM COATED ORAL at 21:34

## 2025-03-05 RX ADMIN — ENOXAPARIN SODIUM 40 MILLIGRAM(S): 100 INJECTION SUBCUTANEOUS at 21:34

## 2025-03-05 RX ADMIN — MUPIROCIN CALCIUM 1 APPLICATION(S): 20 CREAM TOPICAL at 17:23

## 2025-03-05 RX ADMIN — Medication 1 APPLICATION(S): at 05:22

## 2025-03-05 RX ADMIN — MEMANTINE HYDROCHLORIDE 10 MILLIGRAM(S): 21 CAPSULE, EXTENDED RELEASE ORAL at 17:25

## 2025-03-05 RX ADMIN — Medication 500 MILLIGRAM(S): at 05:22

## 2025-03-05 RX ADMIN — Medication 75 MICROGRAM(S): at 05:23

## 2025-03-05 RX ADMIN — SERTRALINE 50 MILLIGRAM(S): 100 TABLET, FILM COATED ORAL at 14:17

## 2025-03-05 RX ADMIN — AMLODIPINE BESYLATE 5 MILLIGRAM(S): 10 TABLET ORAL at 14:17

## 2025-03-05 RX ADMIN — MUPIROCIN CALCIUM 1 APPLICATION(S): 20 CREAM TOPICAL at 05:23

## 2025-03-05 RX ADMIN — Medication 500 MILLIGRAM(S): at 05:23

## 2025-03-05 RX ADMIN — Medication 500 MILLIGRAM(S): at 17:27

## 2025-03-05 RX ADMIN — MEMANTINE HYDROCHLORIDE 10 MILLIGRAM(S): 21 CAPSULE, EXTENDED RELEASE ORAL at 05:23

## 2025-03-05 NOTE — PROGRESS NOTE ADULT - SUBJECTIVE AND OBJECTIVE BOX
REASON FOR CONSULTATION:     Interval History:  1 episode of diarrhea today           REVIEW OF SYSTEMS:  Constitutional, Eyes, ENT, Cardiovascular, Respiratory, Gastrointestinal, Genitourinary, Musculoskeletal, Integumentary, Neurological, Psychiatric, Endocrine, Heme/Lymph, and Allergic/Immunologic review of systems are otherwise negative except as noted in the HPI.          MEDICATIONS  (STANDING):  amLODIPine   Tablet 5 milliGRAM(s) Oral daily  ammonium lactate 12% Lotion 1 Application(s) Topical two times a day  atorvastatin 10 milliGRAM(s) Oral at bedtime  ciprofloxacin     Tablet 500 milliGRAM(s) Oral every 12 hours  clobetasol 0.05% Ointment 1 Application(s) Topical two times a day  enoxaparin Injectable 40 milliGRAM(s) SubCutaneous every 24 hours  levothyroxine 75 MICROGram(s) Oral daily  memantine 10 milliGRAM(s) Oral two times a day  metroNIDAZOLE    Tablet 500 milliGRAM(s) Oral every 12 hours  mupirocin 2% Ointment 1 Application(s) Topical two times a day  sertraline 50 milliGRAM(s) Oral daily  tamsulosin 0.4 milliGRAM(s) Oral daily    MEDICATIONS  (PRN):        Vital Signs Last 24 Hrs  T(C): 36.7 (05 Mar 2025 19:04), Max: 36.7 (05 Mar 2025 19:04)  T(F): 98.1 (05 Mar 2025 19:04), Max: 98.1 (05 Mar 2025 19:04)  HR: 85 (05 Mar 2025 19:04) (74 - 94)  BP: 96/64 (05 Mar 2025 19:04) (96/64 - 120/74)  BP(mean): --  RR: 18 (05 Mar 2025 19:04) (17 - 18)  SpO2: 94% (05 Mar 2025 19:04) (94% - 99%)    Parameters below as of 05 Mar 2025 19:04  Patient On (Oxygen Delivery Method): room air              PHYSICAL EXAM:    Awake alert   [id number]  RS B/l BS  aBd SOft non tender  ext; no pedal edema      LABS:                                   10.7   15.01 )-----------( 367      ( 04 Mar 2025 04:16 )             34.6     03-04    137  |  105  |  23.6[H]  ----------------------------<  73  3.6   |  19.0[L]  |  0.62    Ca    7.5[L]      04 Mar 2025 04:16    TPro  4.7[L]  /  Alb  2.2[L]  /  TBili  0.4  /  DBili  x   /  AST  37[H]  /  ALT  26  /  AlkPhos  158[H]  03-04                  RADIOLOGY & ADDITIONAL STUDIES:  < from: CT Abdomen and Pelvis No Cont (03.02.25 @ 18:42) >  IMPRESSION:  Mild descending colitis.  Short segment mural thickening of the proximal to mid sigmoid colon.   Differentials include decompression versus sequelae of chronic   diverticulitis. Underlying lesion cannot be ruled out.    < end of copied text >

## 2025-03-05 NOTE — PROGRESS NOTE ADULT - ASSESSMENT
84 yr old female with PMH of early dementia, HTN, HLD, Thyroid cancer, seeing Dermatology Dr. Mcnair for bullous pemphigoid, competed prednisone taper, on Dupixent every 2 weeks, with recently diagnosed metastatic melanoma started on Ipi/ Nivo since 11/26/25 x 3 cycles, last on 2/3/25 who was recently in the ED for fatigue/diarrhea  Now Brought back in to the er with weakness    # Metastatic Melanoma   # Poor functional status/ Poor PO intake       Pending discharge to [facility]  No plan for systemic therapy during rehab. Following discharge from rehab - plan for repeat PET & consider resuming systemic if recovered functionally

## 2025-03-05 NOTE — PROGRESS NOTE ADULT - SUBJECTIVE AND OBJECTIVE BOX
OVERNIGHT EVENTS: no acute issues, patient not oriented     Present Symptoms:     Dyspnea: none   Nausea/Vomiting: No  Anxiety:  No  Depression: No  Fatigue: Yes   Loss of appetite: No  Constipation: none     Pain: none             Character-            Duration-            Effect-            Factors-            Frequency-            Location-            Severity-    Pain AD Score:  [email or web address] (press ctrl + left click to view)    Review of Systems: Reviewed       Unable to obtain due to poor mentation   All others negative    MEDICATIONS  (STANDING):  amLODIPine   Tablet 5 milliGRAM(s) Oral daily  ammonium lactate 12% Lotion 1 Application(s) Topical two times a day  atorvastatin 10 milliGRAM(s) Oral at bedtime  ciprofloxacin     Tablet 500 milliGRAM(s) Oral every 12 hours  clobetasol 0.05% Ointment 1 Application(s) Topical two times a day  enoxaparin Injectable 40 milliGRAM(s) SubCutaneous every 24 hours  levothyroxine 75 MICROGram(s) Oral daily  memantine 10 milliGRAM(s) Oral two times a day  metroNIDAZOLE    Tablet 500 milliGRAM(s) Oral every 12 hours  mupirocin 2% Ointment 1 Application(s) Topical two times a day  sertraline 50 milliGRAM(s) Oral daily  tamsulosin 0.4 milliGRAM(s) Oral daily    MEDICATIONS  (PRN):      PHYSICAL EXAM:    Vital Signs Last 24 Hrs  T(C): 36.4 (05 Mar 2025 07:55), Max: 36.7 (04 Mar 2025 19:14)  T(F): 97.6 (05 Mar 2025 07:55), Max: 98 (04 Mar 2025 19:14)  HR: 77 (05 Mar 2025 07:55) (77 - 92)  BP: 116/66 (05 Mar 2025 07:55) (105/68 - 121/79)  BP(mean): --  RR: 17 (05 Mar 2025 07:55) (17 - 18)  SpO2: 97% (05 Mar 2025 07:55) (95% - 100%)    Parameters below as of 05 Mar 2025 07:55  Patient On (Oxygen Delivery Method): room air    General: alert in no acute distress, not oriented     HEENT: normal     Lungs: comfortable    CV: normal      GI: normal     : normal     MSK: weakness     Skin: no rash    LABS:                      10.7   13.74 )-----------( 407      ( 05 Mar 2025 04:11 )             33.6         135  |  103  |  24.1[H]  ----------------------------<  127[H]  3.6   |  19.0[L]  |  0.80    Ca    7.6[L]      05 Mar 2025 04:11    TPro  4.7[L]  /  Alb  2.2[L]  /  TBili  0.4  /  DBili  x   /  AST  37[H]  /  ALT  26  /  AlkPhos  158[H]  03-04    Urinalysis Basic - ( 05 Mar 2025 06:50 )    Color: Dark Yellow / Appearance: Clear / S.023 / pH: x  Gluc: x / Ketone: Negative mg/dL  / Bili: Small / Urobili: 0.2 mg/dL   Blood: x / Protein: Negative mg/dL / Nitrite: Negative   Leuk Esterase: Trace / RBC: 2 /HPF / WBC 0 /HPF   Sq Epi: x / Non Sq Epi: 1 /HPF / Bacteria: Negative /HPF    I&O's Summary    04 Mar 2025 07:01  -  05 Mar 2025 07:00  --------------------------------------------------------  IN: 0 mL / OUT: 1000 mL / NET: -1000 mL    RADIOLOGY & ADDITIONAL STUDIES:    < from: CT Pelvis Bony Only No Cont (25 @ 01:01) >  administered.    COMPARISON: CT of the abdomen and pelvis from 3/2/2025.    FINDINGS:    Bone: No fracture or dislocation. Bone island in the superior right   acetabulum. Degenerative changes of the visualized lower lumbar spine,   sacroiliac joints and pubic symphysis.    Soft tissues: Scattered colonic diverticulosis without diverticulitis.   Atrophic uterus. No adnexal masses. Distended urinary bladder.   Redemonstrated mild subcutaneous inflammatory change in the pelvis.    IMPRESSION:  No fracture or dislocation of the pelvis or either hip.    --- End of Report ---    < end of copied text >    ADVANCE DIRECTIVES/TREATMENT PREFERENCES:  Full code

## 2025-03-05 NOTE — PROGRESS NOTE ADULT - ASSESSMENT
84 year old female with known medical hx of htn, hypothyroidism ,depression and dementia  and Bullous pemphigoid (on prednisone ), melanoma under treatment by Dr Odom - presented 2/28 for diarrheaAND BD PAIN   CT  SCAN OF ABD PELVIS SHOWED  COLTIS WITH ? Diverticulitis   PT WITH LEUKOCYTOSIS  PT UNABLE TO GIVE A GOOD HISTORY SOME MILD DEMENTIA  PT NON TOXIC   ABD EXAM NON TENDER   NO REBOUND OR GUARDING   UNCLEAR PCN ALLERGY   OVERALL IMPROVED  L   EDWIN  CIPRO/FLAGYL  TO COMPLETE 10 DAYS  FOR  DIVERTICULITIS   WOULD TREAT   THROUGH 3/13  CONSIDER GI EVAL UNCLEAR IF SHE HAS OUTPT GASTROENTEROLOGIST  WILL D/W HOSPITALIST

## 2025-03-05 NOTE — PROGRESS NOTE ADULT - SUBJECTIVE AND OBJECTIVE BOX
INFECTIOUS DISEASES AND INTERNAL [facility]ICINE at [facility]  =======================================================  Archie Egan MD  Diplomates American Board of Internal Medicine and Infectious Diseases  Telephone [phone]  Fax            [phone]  =======================================================    DEJAN CONTRERAS [id number]    Follow up: colitis diverticulitis      Allergies:  Omnipaque 350 (Anaphylaxis)  penicillin (Unknown)      Medications:  amLODIPine   Tablet 5 milliGRAM(s) Oral daily  ammonium lactate 12% Lotion 1 Application(s) Topical two times a day  atorvastatin 10 milliGRAM(s) Oral at bedtime  ciprofloxacin     Tablet 500 milliGRAM(s) Oral every 12 hours  clobetasol 0.05% Ointment 1 Application(s) Topical two times a day  enoxaparin Injectable 40 milliGRAM(s) SubCutaneous every 24 hours  lactated ringers. 1000 milliLiter(s) IV Continuous <Continuous>  levothyroxine 75 MICROGram(s) Oral daily  memantine 10 milliGRAM(s) Oral two times a day  metroNIDAZOLE    Tablet 500 milliGRAM(s) Oral every 12 hours  mupirocin 2% Ointment 1 Application(s) Topical two times a day  sertraline 50 milliGRAM(s) Oral daily    SOCIAL       FAMILY   FAMILY HISTORY:    REVIEW OF SYSTEMS:  CONSTITUTIONAL:  No Fever or chills  HEENT:   No diplopia or blurred vision.  No earache, sore throat or runny nose.  CARDIOVASCULAR:  No pressure, squeezing, strangling, tightness, heaviness or aching about the chest, neck, axilla or epigastrium.  RESPIRATORY:  No cough, shortness of breath, PND or orthopnea.  GASTROINTESTINAL:  No nausea, vomiting or diarrhea.  GENITOURINARY:  No dysuria, frequency or urgency. No Blood in urine  MUSCULOSKELETAL:   moves all joints  SKIN:  No change in skin, hair or nails.  NEUROLOGIC:  No paresthesias, fasciculations, seizures or weakness.  PSYCHIATRIC:  No disorder of thought or mood.  ENDOCRINE:  No heat or cold intolerance, polyuria or polydipsia.  HEMATOLOGICAL:  No easy bruising or bleeding.            Physical Exam:  I Vital Signs Last 24 Hrs  T(C): 36.5 (05 Mar 2025 03:13), Max: 36.7 (04 Mar 2025 19:14)  T(F): 97.7 (05 Mar 2025 03:13), Max: 98 (04 Mar 2025 19:14)  HR: 92 (05 Mar 2025 03:13) (79 - 92)  BP: 106/71 (05 Mar 2025 03:13) (105/68 - 121/79)  BP(mean): --  RR: 17 (05 Mar 2025 03:13) (17 - 18)  SpO2: 99% (05 Mar 2025 03:13) (95% - 100%)    Parameters below as of 05 Mar 2025 03:13  Patient On (Oxygen Delivery Method): room air              GEN: NAD,   HEENT: normocephalic and atraumatic. EOMI. ALEIDA.    NECK: Supple. No carotid bruits.  No lymphadenopathy or thyromegaly.  LUNGS: Clear to auscultation.  HEART: Regular rate and rhythm without murmur.  ABDOMEN: Soft, midl diffuse tenderness  Positive bowel sounds.    : No CVA tenderness  EXTREMITIES: Without any cyanosis, clubbing, rash, lesions or edema.  MSK: no joint swelling  NEUROLOGIC: Cranial nerves II through XII are grossly intact.  PSYCHIATRIC: Appropriate affect .  SKIN: No ulceration or induration present.        Labs:  Vitals:  ======     =======================================================  Current Antibiotics:  ciprofloxacin     Tablet 500 milliGRAM(s) Oral every 12 hours  metroNIDAZOLE    Tablet 500 milliGRAM(s) Oral every 12 hours    Other medications:  amLODIPine   Tablet 5 milliGRAM(s) Oral daily  ammonium lactate 12% Lotion 1 Application(s) Topical two times a day  atorvastatin 10 milliGRAM(s) Oral at bedtime  clobetasol 0.05% Ointment 1 Application(s) Topical two times a day  enoxaparin Injectable 40 milliGRAM(s) SubCutaneous every 24 hours  lactated ringers. 1000 milliLiter(s) IV Continuous <Continuous>  levothyroxine 75 MICROGram(s) Oral daily  memantine 10 milliGRAM(s) Oral two times a day  mupirocin 2% Ointment 1 Application(s) Topical two times a day  sertraline 50 milliGRAM(s) Oral daily      =======================================================  Labs:                                 10.7   13.74 )-----------( 407      ( 05 Mar 2025 04:11 )             33.6   03-05    135  |  103  |  24.1[H]  ----------------------------<  127[H]  3.6   |  19.0[L]  |  0.80    Ca    7.6[L]      05 Mar 2025 04:11    TPro  4.7[L]  /  Alb  2.2[L]  /  TBili  0.4  /  DBili  x   /  AST  37[H]  /  ALT  26  /  AlkPhos  158[H]  03-04    Culture - Urine (collected 02-25-25 @ 23:52)  Source: Clean Catch Clean Catch (Midstream)  Final Report (02-27-25 @ 10:57):    <10,000 CFU/mL Normal Urogenital Mary    Culture - Wound Aerobic (collected 12-05-24 @ 09:56)  Source: Skin/Wound  Final Report (12-08-24 @ 07:51):    Rare Staphylococcus aureus  Organism: Staphylococcus aureus (12-08-24 @ 07:51)  Organism: Staphylococcus aureus (12-08-24 @ 07:51)    Sensitivities:      Method Type: ALISA      -  Clindamycin: R <=0.25 This isolate is presumed to be clindamycin resistant based on detection of inducible resistance. Clindamycin may still be effective in some patients.      -  Erythromycin: R >4      -  Gentamicin: S <=4 Should not be used as monotherapy      -  Oxacillin: S <=0.25 Oxacillin predicts susceptibility for dicloxacillin, methicillin, and nafcillin      -  Penicillin: R >2      -  Rifampin: S <=1 Should not be used as monotherapy      -  Tetracycline: S <=4      -  Trimethoprim/Sulfamethoxazole: S <=0.5/9.5      -  Vancomycin: S 1      Creatinine: 0.62 mg/dL (03-04-25 @ 04:16)  Creatinine: 0.75 mg/dL (03-03-25 @ 07:12)  Creatinine: 0.83 mg/dL (03-01-25 @ 09:11)  Creatinine: 0.95 mg/dL (02-28-25 @ 15:51)            WBC Count: 15.01 K/uL (03-04-25 @ 04:16)  WBC Count: 10.60 K/uL (03-03-25 @ 07:12)  WBC Count: 16.59 K/uL (03-01-25 @ 09:11)  WBC Count: 18.43 K/uL (02-28-25 @ 15:51)    SARS-CoV-2 Result: NotDetec (02-25-25 @ 21:11)      Alkaline Phosphatase: 158 U/L (03-04-25 @ 04:16)  Alkaline Phosphatase: 151 U/L (03-03-25 @ 07:12)  Alkaline Phosphatase: 173 U/L (03-01-25 @ 09:11)  Alkaline Phosphatase: 178 U/L (02-28-25 @ 15:51)  Alanine Aminotransferase (ALT/SGPT): 26 U/L (03-04-25 @ 04:16)  Alanine Aminotransferase (ALT/SGPT): 23 U/L (03-03-25 @ 07:12)  Alanine Aminotransferase (ALT/SGPT): 24 U/L (03-01-25 @ 09:11)  Alanine Aminotransferase (ALT/SGPT): 24 U/L (02-28-25 @ 15:51)  Aspartate Aminotransferase (AST/SGOT): 37 U/L (03-04-25 @ 04:16)  Aspartate Aminotransferase (AST/SGOT): 31 U/L (03-03-25 @ 07:12)  Aspartate Aminotransferase (AST/SGOT): 30 U/L (03-01-25 @ 09:11)  Aspartate Aminotransferase (AST/SGOT): 28 U/L (02-28-25 @ 15:51)  Bilirubin Total: 0.4 mg/dL (03-04-25 @ 04:16)  Bilirubin Total: 0.3 mg/dL (03-03-25 @ 07:12)  Bilirubin Total: 0.3 mg/dL (03-01-25 @ 09:11)  Bilirubin Total: 0.3 mg/dL (02-28-25 @ 15:51)

## 2025-03-05 NOTE — PROGRESS NOTE ADULT - SUBJECTIVE AND OBJECTIVE BOX
Cris Morales M.D.    Patient is a 84y old  Female who presents with a chief complaint of colitis (05 Mar 2025 10:57)      SUBJECTIVE / OVERNIGHT EVENTS: reported urinary retention overnight. UA negative.     Patient denies chest pain, SOB, abd pain, N/V, fever, chills, dysuria or any other complaints. All remainder ROS negative.     MEDICATIONS  (STANDING):  amLODIPine   Tablet 5 milliGRAM(s) Oral daily  ammonium lactate 12% Lotion 1 Application(s) Topical two times a day  atorvastatin 10 milliGRAM(s) Oral at bedtime  ciprofloxacin     Tablet 500 milliGRAM(s) Oral every 12 hours  clobetasol 0.05% Ointment 1 Application(s) Topical two times a day  enoxaparin Injectable 40 milliGRAM(s) SubCutaneous every 24 hours  levothyroxine 75 MICROGram(s) Oral daily  memantine 10 milliGRAM(s) Oral two times a day  metroNIDAZOLE    Tablet 500 milliGRAM(s) Oral every 12 hours  mupirocin 2% Ointment 1 Application(s) Topical two times a day  sertraline 50 milliGRAM(s) Oral daily  tamsulosin 0.4 milliGRAM(s) Oral daily    MEDICATIONS  (PRN):      I&O's Summary    04 Mar 2025 07:01  -  05 Mar 2025 07:00  --------------------------------------------------------  IN: 0 mL / OUT: 1000 mL / NET: -1000 mL        PHYSICAL EXAM:  Vital Signs Last 24 Hrs  T(C): 37 (05 Mar 2025 11:49), Max: 37 (05 Mar 2025 11:49)  T(F): 98.6 (05 Mar 2025 11:49), Max: 98.6 (05 Mar 2025 11:49)  HR: 83 (05 Mar 2025 11:49) (77 - 92)  BP: 136/76 (05 Mar 2025 11:49) (106/71 - 136/76)  BP(mean): --  RR: 17 (05 Mar 2025 11:49) (17 - 18)  SpO2: 96% (05 Mar 2025 11:49) (95% - 100%)    Parameters below as of 05 Mar 2025 11:49  Patient On (Oxygen Delivery Method): room air    CONSTITUTIONAL: NAD, well-groomed  RESPIRATORY: Normal respiratory effort; lungs are clear to auscultation bilaterally  CARDIOVASCULAR: Regular rate and rhythm, no LE edema  ABDOMEN: Nontender to palpation, normoactive bowel sounds  PSYCH: A+O x1; affect appropriate    LABS:                        10.7   13.74 )-----------( 407      ( 05 Mar 2025 04:11 )             33.6     03-05    135  |  103  |  24.1[H]  ----------------------------<  127[H]  3.6   |  19.0[L]  |  0.80    Ca    7.6[L]      05 Mar 2025 04:11    TPro  4.7[L]  /  Alb  2.2[L]  /  TBili  0.4  /  DBili  x   /  AST  37[H]  /  ALT  26  /  AlkPhos  158[H]  03-04          Urinalysis Basic - ( 05 Mar 2025 06:50 )    Color: Dark Yellow / Appearance: Clear / S.023 / pH: x  Gluc: x / Ketone: Negative mg/dL  / Bili: Small / Urobili: 0.2 mg/dL   Blood: x / Protein: Negative mg/dL / Nitrite: Negative   Leuk Esterase: Trace / RBC: 2 /HPF / WBC 0 /HPF   Sq Epi: x / Non Sq Epi: 1 /HPF / Bacteria: Negative /HPF        CAPILLARY BLOOD GLUCOSE          RADIOLOGY & ADDITIONAL TESTS:  Results Reviewed:   Imaging Personally Reviewed:  Electrocardiogram Personally Reviewed:

## 2025-03-05 NOTE — PROGRESS NOTE ADULT - TIME BILLING
mdm
This includes chart review, patient assessment, discussion with interdisciplinary team members following patient. Coordination of care with providers and care coordination.

## 2025-03-05 NOTE — PROGRESS NOTE ADULT - ASSESSMENT
84F with metastatic melanoma on active treatment, bullous pemphigoid, mild-moderate dementia, admitted with colitis/ diverticulitis.  Palliative consulted for complex medical decision making in the setting of likely life-limiting illness.     # colitis/ diverticulitis  - infectious diseases managing  - on antibiotics per infectious diseases   - supportive care   - will need to follow up with gastroenterology outpatient    # metastatic melanoma  - recently diagnosed in November of 2024  - s/p 3 cycles of chemotherapy - last Tx on 2/3     # dementia  - patient unable to make medical decisions  - prior to last month, she was functionally independent but with some cognitive impairment  - supportive care    # Encounter for palliative care  - will reach out to HCP Leonard navarro to introduce our service and evaluate goals/ advanced directives    84F with metastatic melanoma on active treatment, bullous pemphigoid, mild-moderate dementia, admitted with colitis/ diverticulitis.  Palliative consulted for complex medical decision making in the setting of likely life-limiting illness.     # colitis/ diverticulitis  - infectious diseases managing  - on antibiotics per infectious diseases   - supportive care   - will need to follow up with gastroenterology outpatient    # metastatic melanoma  - recently diagnosed in November of 2024  - s/p 3 cycles of chemotherapy - last Tx on 2/3     # dementia  - patient unable to make medical decisions  - prior to last month, she was functionally independent but with some cognitive impairment  - supportive care    # Encounter for palliative care  - see goals of care  - plans for SANDRA     Goals established, symptoms managed, will sign off. Please reconsult our service should anything change.

## 2025-03-05 NOTE — PROGRESS NOTE ADULT - CONVERSATION DETAILS
Discussed overall medical condition, prognosis, and options for plan of care with adopted brother and HCP Leonard. Explained overall situation and condition. Leonard understands that she has deteriorated and that our plan will be for her to go to rehab and see how she does, and if she improves, she will need a PET scan to determine further treatments/ etc. If she does not improve, Leonard understands there would be more conversation about no further treatments, and a different plan of care. I addressed with him advanced directives - She has a pre - existing do not resuscitate and do not intubate - MOLST completed and placed on chart.

## 2025-03-05 NOTE — PROGRESS NOTE ADULT - ASSESSMENT
84 yr old female with PMH of early dementia, HTN, HLD, Thyroid cancer, seeing Dermatology Dr. Mcnair for bullous pemphigoid, competed prednisone taper, on Dupixent every 2 weeks, with recently diagnosed metastatic melanoma started on Ipi/ Nivo since 11/26/25 x 3 cycles, last on 2/3/25 who was recently in the ED for fatigue/diarrhea, noted with colitis on CT.     #Colitis  CT result noted above   ID recs shira  on empiric Flagyl/Cipro until 3/13  outpt GI followup    #Urinary rentention  UA negative  start flomax  q6hr bladder scan    #Dementia  cw home memantine and zoloft    #Leukocytosis  2/2 recent steroid use  abx as above  serial CBC    #Hypothyroidism  cw home synthroid     #HTN  #HLD  cw home Lipitor and Norvasc    #Met melanoma  management per heme/onc  Outpt PET scan    DVT ppx: Lovenox QD  Dispo: pt will need [facility], pending heme/onc clearance, [facility] unable to accommodate chemo/PET scan     PT -- [facility]

## 2025-03-06 LAB
CULTURE RESULTS: NO GROWTH — SIGNIFICANT CHANGE UP
SPECIMEN SOURCE: SIGNIFICANT CHANGE UP

## 2025-03-06 PROCEDURE — 99232 SBSQ HOSP IP/OBS MODERATE 35: CPT

## 2025-03-06 RX ADMIN — Medication 1 APPLICATION(S): at 05:36

## 2025-03-06 RX ADMIN — AMLODIPINE BESYLATE 5 MILLIGRAM(S): 10 TABLET ORAL at 05:37

## 2025-03-06 RX ADMIN — MUPIROCIN CALCIUM 1 APPLICATION(S): 20 CREAM TOPICAL at 05:37

## 2025-03-06 RX ADMIN — Medication 75 MICROGRAM(S): at 05:37

## 2025-03-06 RX ADMIN — SERTRALINE 50 MILLIGRAM(S): 100 TABLET, FILM COATED ORAL at 12:10

## 2025-03-06 RX ADMIN — Medication 500 MILLIGRAM(S): at 17:45

## 2025-03-06 RX ADMIN — MUPIROCIN CALCIUM 1 APPLICATION(S): 20 CREAM TOPICAL at 17:52

## 2025-03-06 RX ADMIN — TAMSULOSIN HYDROCHLORIDE 0.4 MILLIGRAM(S): 0.4 CAPSULE ORAL at 12:10

## 2025-03-06 RX ADMIN — Medication 1 APPLICATION(S): at 17:52

## 2025-03-06 RX ADMIN — Medication 500 MILLIGRAM(S): at 17:44

## 2025-03-06 RX ADMIN — Medication 500 MILLIGRAM(S): at 05:38

## 2025-03-06 RX ADMIN — MEMANTINE HYDROCHLORIDE 10 MILLIGRAM(S): 21 CAPSULE, EXTENDED RELEASE ORAL at 17:45

## 2025-03-06 RX ADMIN — Medication 500 MILLIGRAM(S): at 05:37

## 2025-03-06 RX ADMIN — MEMANTINE HYDROCHLORIDE 10 MILLIGRAM(S): 21 CAPSULE, EXTENDED RELEASE ORAL at 05:37

## 2025-03-06 RX ADMIN — ENOXAPARIN SODIUM 40 MILLIGRAM(S): 100 INJECTION SUBCUTANEOUS at 21:11

## 2025-03-06 RX ADMIN — ATORVASTATIN CALCIUM 10 MILLIGRAM(S): 80 TABLET, FILM COATED ORAL at 21:11

## 2025-03-06 RX ADMIN — Medication 1 APPLICATION(S): at 05:37

## 2025-03-06 NOTE — PROGRESS NOTE ADULT - SUBJECTIVE AND OBJECTIVE BOX
Cris Morales M.D.    Patient is a 84y old  Female who presents with a chief complaint of colitis (05 Mar 2025 20:19)      SUBJECTIVE / OVERNIGHT EVENTS: no event overnight.     Patient denies chest pain, SOB, abd pain, N/V, fever, chills, dysuria or any other complaints. All remainder ROS negative.     MEDICATIONS  (STANDING):  amLODIPine   Tablet 5 milliGRAM(s) Oral daily  ammonium lactate 12% Lotion 1 Application(s) Topical two times a day  atorvastatin 10 milliGRAM(s) Oral at bedtime  ciprofloxacin     Tablet 500 milliGRAM(s) Oral every 12 hours  clobetasol 0.05% Ointment 1 Application(s) Topical two times a day  enoxaparin Injectable 40 milliGRAM(s) SubCutaneous every 24 hours  levothyroxine 75 MICROGram(s) Oral daily  memantine 10 milliGRAM(s) Oral two times a day  metroNIDAZOLE    Tablet 500 milliGRAM(s) Oral every 12 hours  mupirocin 2% Ointment 1 Application(s) Topical two times a day  sertraline 50 milliGRAM(s) Oral daily  tamsulosin 0.4 milliGRAM(s) Oral daily    MEDICATIONS  (PRN):      I&O's Summary      PHYSICAL EXAM:  Vital Signs Last 24 Hrs  T(C): 36.6 (06 Mar 2025 07:43), Max: 36.7 (05 Mar 2025 19:04)  T(F): 97.9 (06 Mar 2025 07:43), Max: 98.1 (05 Mar 2025 19:04)  HR: 72 (06 Mar 2025 07:43) (72 - 94)  BP: 97/60 (06 Mar 2025 07:43) (96/64 - 120/74)  BP(mean): 78 (06 Mar 2025 05:05) (78 - 79)  RR: 18 (06 Mar 2025 07:43) (17 - 18)  SpO2: 99% (06 Mar 2025 07:43) (94% - 99%)    Parameters below as of 06 Mar 2025 07:43  Patient On (Oxygen Delivery Method): room air    CONSTITUTIONAL: NAD, well-groomed  RESPIRATORY: Normal respiratory effort; lungs are clear to auscultation bilaterally  CARDIOVASCULAR: Regular rate and rhythm, no LE edema  ABDOMEN: Nontender to palpation, normoactive bowel sounds  PSYCH: A+O x1; affect appropriate    LABS:                        10.7   13.74 )-----------( 407      ( 05 Mar 2025 04:11 )             33.6     03-05    135  |  103  |  24.1[H]  ----------------------------<  127[H]  3.6   |  19.0[L]  |  0.80    Ca    7.6[L]      05 Mar 2025 04:11            Urinalysis Basic - ( 05 Mar 2025 06:50 )    Color: Dark Yellow / Appearance: Clear / S.023 / pH: x  Gluc: x / Ketone: Negative mg/dL  / Bili: Small / Urobili: 0.2 mg/dL   Blood: x / Protein: Negative mg/dL / Nitrite: Negative   Leuk Esterase: Trace / RBC: 2 /HPF / WBC 0 /HPF   Sq Epi: x / Non Sq Epi: 1 /HPF / Bacteria: Negative /HPF        CAPILLARY BLOOD GLUCOSE          RADIOLOGY & ADDITIONAL TESTS:  Results Reviewed:   Imaging Personally Reviewed:  Electrocardiogram Personally Reviewed:

## 2025-03-06 NOTE — PROGRESS NOTE ADULT - ASSESSMENT
84 yr old female with PMH of early dementia, HTN, HLD, Thyroid cancer, seeing Dermatology Dr. Mcnair for bullous pemphigoid, competed prednisone taper, on Dupixent every 2 weeks, with recently diagnosed metastatic melanoma started on Ipi/ Nivo since 11/26/25 x 3 cycles, last on 2/3/25 who was recently in the ED for fatigue/diarrhea, noted with colitis on CT.     #Colitis  CT result noted above   ID recs shira  on empiric Flagyl/Cipro until 3/13  outpt GI followup    #Urinary rentention  UA negative  start flomax  q6hr bladder scan    #Dementia  cw home memantine and zoloft    #Leukocytosis  2/2 recent steroid use  abx as above  serial CBC    #Hypothyroidism  cw home synthroid     #HTN  #HLD  cw home Lipitor and Norvasc    #Met melanoma  management per heme/onc  Outpt PET scan    DVT ppx: Lovenox QD  Dispo: pending [facility]

## 2025-03-07 VITALS
TEMPERATURE: 98 F | RESPIRATION RATE: 18 BRPM | HEART RATE: 77 BPM | SYSTOLIC BLOOD PRESSURE: 108 MMHG | DIASTOLIC BLOOD PRESSURE: 62 MMHG | OXYGEN SATURATION: 96 %

## 2025-03-07 PROCEDURE — 80048 BASIC METABOLIC PNL TOTAL CA: CPT

## 2025-03-07 PROCEDURE — 84439 ASSAY OF FREE THYROXINE: CPT

## 2025-03-07 PROCEDURE — G0545: CPT

## 2025-03-07 PROCEDURE — 72192 CT PELVIS W/O DYE: CPT | Mod: MC

## 2025-03-07 PROCEDURE — 96376 TX/PRO/DX INJ SAME DRUG ADON: CPT

## 2025-03-07 PROCEDURE — 84480 ASSAY TRIIODOTHYRONINE (T3): CPT

## 2025-03-07 PROCEDURE — 70450 CT HEAD/BRAIN W/O DYE: CPT | Mod: MC

## 2025-03-07 PROCEDURE — 99239 HOSP IP/OBS DSCHRG MGMT >30: CPT

## 2025-03-07 PROCEDURE — 83735 ASSAY OF MAGNESIUM: CPT

## 2025-03-07 PROCEDURE — 81001 URINALYSIS AUTO W/SCOPE: CPT

## 2025-03-07 PROCEDURE — 71045 X-RAY EXAM CHEST 1 VIEW: CPT

## 2025-03-07 PROCEDURE — 36415 COLL VENOUS BLD VENIPUNCTURE: CPT

## 2025-03-07 PROCEDURE — 84443 ASSAY THYROID STIM HORMONE: CPT

## 2025-03-07 PROCEDURE — 85025 COMPLETE CBC W/AUTO DIFF WBC: CPT

## 2025-03-07 PROCEDURE — 74176 CT ABD & PELVIS W/O CONTRAST: CPT | Mod: MC

## 2025-03-07 PROCEDURE — 87086 URINE CULTURE/COLONY COUNT: CPT

## 2025-03-07 PROCEDURE — 96374 THER/PROPH/DIAG INJ IV PUSH: CPT

## 2025-03-07 PROCEDURE — 85027 COMPLETE CBC AUTOMATED: CPT

## 2025-03-07 PROCEDURE — 93005 ELECTROCARDIOGRAM TRACING: CPT

## 2025-03-07 PROCEDURE — 72125 CT NECK SPINE W/O DYE: CPT | Mod: MC

## 2025-03-07 PROCEDURE — 99285 EMERGENCY DEPT VISIT HI MDM: CPT | Mod: 25

## 2025-03-07 PROCEDURE — 80053 COMPREHEN METABOLIC PANEL: CPT

## 2025-03-07 PROCEDURE — 99232 SBSQ HOSP IP/OBS MODERATE 35: CPT

## 2025-03-07 PROCEDURE — G0378: CPT

## 2025-03-07 PROCEDURE — 84436 ASSAY OF TOTAL THYROXINE: CPT

## 2025-03-07 RX ORDER — METRONIDAZOLE 250 MG
1 TABLET ORAL
Qty: 0 | Refills: 0 | DISCHARGE
Start: 2025-03-07

## 2025-03-07 RX ORDER — CIPROFLOXACIN HCL 250 MG
1 TABLET ORAL
Qty: 0 | Refills: 0 | DISCHARGE
Start: 2025-03-07

## 2025-03-07 RX ADMIN — MUPIROCIN CALCIUM 1 APPLICATION(S): 20 CREAM TOPICAL at 05:42

## 2025-03-07 RX ADMIN — Medication 500 MILLIGRAM(S): at 05:39

## 2025-03-07 RX ADMIN — Medication 75 MICROGRAM(S): at 05:40

## 2025-03-07 RX ADMIN — Medication 1 APPLICATION(S): at 05:42

## 2025-03-07 RX ADMIN — AMLODIPINE BESYLATE 5 MILLIGRAM(S): 10 TABLET ORAL at 05:39

## 2025-03-07 RX ADMIN — SERTRALINE 50 MILLIGRAM(S): 100 TABLET, FILM COATED ORAL at 10:04

## 2025-03-07 RX ADMIN — Medication 500 MILLIGRAM(S): at 05:40

## 2025-03-07 RX ADMIN — MEMANTINE HYDROCHLORIDE 10 MILLIGRAM(S): 21 CAPSULE, EXTENDED RELEASE ORAL at 05:40

## 2025-03-07 RX ADMIN — TAMSULOSIN HYDROCHLORIDE 0.4 MILLIGRAM(S): 0.4 CAPSULE ORAL at 10:05

## 2025-03-07 RX ADMIN — Medication 1 APPLICATION(S): at 05:43

## 2025-03-07 NOTE — DISCHARGE NOTE PROVIDER - HOSPITAL COURSE
Hospital Course  HPI:  84 year old female wih known hx of bullous pemphigoid, dementia and newly diagnosed melanoma under treatment with Dr villa presents with diarrhea- she is a poor historian 0 discussed with ED Oncology and Gi and ID  plan to treat with abx and discharge to facility - and out patient Gi Follow up   patient is unable to give hx - states " i live ere" denied any complaints including diarrhea CP fever chills- doesn't know her melanoma hx either  (04 Mar 2025 08:24)    You were admitted for diarrhea and found to have suspected colitis.  You were started on IV antibiotics with improvement of your symptoms.    You will need to undergo subacute rehabilitation.      You will need to continue oral antibiotics until 3/13/25.    You will need to follow up with your primary care physician and a gastroenterologist.      Source of Infection: Colitis   Antibiotic / Last Day: Ciprofloxacin 250 mg bid, flagyl 500 bid until 3/13/25    Discharging Provider:  Juan Garcia MD  Contact Info: [phone] - Please call with any questions or concerns.       Hospital Course  HPI:  84 year old female wih known hx of bullous pemphigoid, dementia and newly diagnosed melanoma under treatment with Dr villa presents with diarrhea- she is a poor historian 0 discussed with ED Oncology and Gi and ID  plan to treat with abx and discharge to facility - and out patient Gi Follow up   patient is unable to give hx - states " i live ere" denied any complaints including diarrhea CP fever chills- doesn't know her melanoma hx either  (04 Mar 2025 08:24)    You were admitted for diarrhea and found to have suspected colitis.  You were started on IV antibiotics with improvement of your symptoms.  Pt was found to have urinary retention and lara placed after failing trial of void.  Please follow up with urology.     You will need to undergo subacute rehabilitation.      You will need to continue oral antibiotics until 3/13/25.    You will need to follow up with your primary care physician and a gastroenterologist.      Source of Infection: Colitis   Antibiotic / Last Day: Ciprofloxacin 250 mg bid, flagyl 500 bid until 3/13/25    Discharging Provider:  Juan Garcia MD  Contact Info: [phone] - Please call with any questions or concerns.

## 2025-03-07 NOTE — DISCHARGE NOTE PROVIDER - DATE OF DISCHARGE SERVICE:
YOUR SURGERY DATE: 3/5/25    ARRIVAL TIME: 11:15 AM  RIGHT EYE    You have been scheduled for surgery at   [facility][note 3], [id number], [place] [address] ([phone])    Please follow these instructions carefully:  Patients should plan to be at the surgery center for approximately 2 ½ hours.  Prior to surgery, you will complete any further paperwork, labs, final preps, etc. This also gives the surgery team time to meet you, discuss your surgical experience, and answer any further questions you and/or your family/care partner may have.     Due to sedation/anesthesia, you must arrange for someone to drive you home after surgery.  You will not be allowed to take a cab or Uber without someone accompanying you.    Schedule your pre-op physical with your primary doctor. Your surgery will be postponed if you do not obtain pre-op clearance within 30 days of your surgery date.       EATING RESTRICTIONS:    You may have a light meal of dry toast only (no butter or jelly) up to 10 hours prior to your surgery.  You may have clear liquids up to 6 hours prior to your surgery (includes water, white soda, and apple juice).  Plain tea and black coffee are acceptable (NO CREAM OR SUGAR).      YOUR SURGERY MAY BE CANCELLED IF THESE RESTRICTIONS REGARDING EATING AND DRINKING ARE NOT FOLLOWED.    MEDICATION RESTRICTIONS:  If you are using certain medications for controlling diabetes or weight-loss, you may need to hold these medications prior to your scheduled surgery.  Please review the list of examples of medications and discuss these with your primary care provider.      YOUR SURGERY MAY BE CANCELLED IF THESE RESTRICTIONS REGARDING MEDICATIONS ARE NOT FOLLOWED.      SGLT-2 INHIBITORS ex. Jardiance     GLP-1 AGONISTS ex. Dulaglutide (Trulicity) Liraglutide (Victoza) Semaglutide (Ozempic, Wegovy, Rybesus)     ANOREXIANTS ex. Phentermine (Adipex, Lomaira)       If you have any questions before your surgery, feel  free to call Pre-Admissions at [phone]. Our professional nursing staff will be pleased to help you.                      POST OPERATIVE INSTRUCTIONS:  Take acetaminophen (Tylenol) 650 mg to 1000 mg every 4-6 hours if needed for discomfort.  If the pain is severe or not relieved by acetaminophen, call the office at [phone].   After 5 p.m., please call our after-hours line [phone].    It is normal for your vision to be “foggy”, see halos around lights, and for the pupil to be either dilated (large) or constricted (small) for a few days following surgery.    If at any time you develop severe pain, fever, or sudden vision loss, please call the office.     You may use your eyes for reading, TV, hobbies, etc.  No bending at the waist, lifting more than 10 pounds, or significant straining for one week after surgery.  No eye make-up for one week after surgery.  No swimming for four weeks after surgery.     You may shower and wash your hair after you have seen the doctor on the day after your surgery.  Take care not to bump your eye or get soap in your eye.     You may be more comfortable wearing sunglasses in bright light, especially outdoors, for at least the first week.  If the eye is sensitive to light or wind you may want to continue to wear them longer.     You may resume driving the day after surgery if you feel comfortable.     After surgery, you will be using three eye drops, which may be used in any order.  **Bring all eye drops with you to the day of surgery and post-op appointments.   See attached drop chart for details.    **PLEASE WAIT 5 MINUTES BETWEEN EACH DROP TO ALLOW MEDICATION TO ABSORB COMPLETELY.  PRE-OPERATIVE EYE DROPS     Instill KETOROLAC in the operative eye 4 times a day for 7 days before surgery.  Start drops in RIGHT  EYE on 2/26/25    Instill MOXIFLOXACIN (VIGAMOX) in the operative eye 4 times a day 3 days before surgery. Start drops in RIGHT  EYE on 3/2/25            BLOOD  THINNERS   Certain medications can put you at an increased risk of bleeding. Dr. Urbina may request that you avoid the following medications for 1 week  prior to your procedure.  She will inform you if you do not need to stop these medications. If you are taking any of the following medications, please notify us immediately.     Any ASPIRIN (acetylsalicylic acid) or ASPIRIN containing products:     Eg.        ECOTRIN     NAVNEET      ASCRIPTIN      EXCEDRIN     FLORINAL     Any Non-steroidal Anti-inflammatory medications:            IBUPROFEN - eg.      MIDOL         PAMPRIN ADVIL   NUPRIN          NAPROXEN - eg.       ALEVE          NAPROSYN      INDOCIN - eg.            INDOMETHACIN      CELEBREX      VIOXX       CATAFLAM     CLINORIL     LODINE                     FENOPRON      FEDLENE      SALDAC      RELAFEN     TORADOL     SULINDAC     Any of the following anticoagulation medications:      LOVENOX=ENOXAPARIN      FRAGMIN=DALTEPARIN HEPARIN     ORGARAN=DANAPAROID    COUMADIN/WARFARIN     ELIQUIS= APIXABAN                  XARELTO=RIVAROXABAN    NORMIFLO=ARDEPARIN                        Or any of the following miscellaneous medications or substances:     PLAVIX=CLOPIDOGREL     PERSANTINE = DIPYRIDAMOLE  PLETAL     TRENTAL   TICLID     AGGRASTAT     VITAMIN E     GARLIC     GINGKO BILOBA              If you have any questions on the above information, please feel free to contact Dr. Urbina’s eye technicians at [phone] and ask for the Eye department.       07-Mar-2025

## 2025-03-07 NOTE — PROGRESS NOTE ADULT - ASSESSMENT
84 year old female with known medical hx of htn, hypothyroidism ,depression and dementia  and Bullous pemphigoid (on prednisone ), melanoma under treatment by Dr Odom - presented 2/28 for diarrheaAND BD PAIN   CT  SCAN OF ABD PELVIS SHOWED  COLTIS WITH ? Diverticulitis   PT WITH LEUKOCYTOSIS  PT UNABLE TO GIVE A GOOD HISTORY SOME MILD DEMENTIA  PT NON TOXIC   ABD EXAM NON TENDER   NO REBOUND OR GUARDING   UNCLEAR PCN ALLERGY   OVERALL IMPROVED  L   EDWIN  CIPRO/FLAGYL  TO COMPLETE 10 DAYS  FOR  DIVERTICULITIS   WOULD TREAT   THROUGH 3/13  CONSIDER GI EVAL UNCLEAR IF SHE HAS OUTPT GASTROENTEROLOGIST  OVERALL IMPROVED FOR DISHCHARGE TO [facility]/W HOSPITALIST

## 2025-03-07 NOTE — DISCHARGE NOTE PROVIDER - NSDCMRMEDTOKEN_GEN_ALL_CORE_FT
ammonium lactate 12% topical lotion: Apply topically to affected area 2 times a day 1 Apply topically to affected area 2 times a day  ciprofloxacin 500 mg oral tablet: 1 tab(s) orally every 12 hours Last day 3/13/25  clobetasol 0.05% topical ointment: Apply topically to affected area 2 times a day 1 Apply topically to affected area 2 times a day affected areas for up to 2 weeks on, then 1 week off for rest of the body  levothyroxine: 50 microgram(s) once a day  memantine 10 mg oral tablet: 1 tab(s) orally 2 times a day  metroNIDAZOLE 500 mg oral tablet: 1 tab(s) orally every 12 hours last day 3/13/25  mupirocin 2% topical ointment: Apply topically to affected area 2 times a day 1 Apply topically to affected area 2 times a day to open erosions  Norvasc 5 mg oral tablet: 1 tab(s) orally once a day  pravastatin 20 mg oral tablet: 1 tab(s) orally once a day  Sertraline: 50 milligram(s) once a day

## 2025-03-07 NOTE — DISCHARGE NOTE PROVIDER - CARE PROVIDER_API CALL
Hiro Wheeler  Urology  [address], [address]  [place] [address]  Phone: [phone]  Fax: [phone]  Follow Up Time: 1 week

## 2025-03-07 NOTE — DISCHARGE NOTE NURSING/CASE MANAGEMENT/SOCIAL WORK - FINANCIAL ASSISTANCE
[facility] provides services at a reduced cost to those who are determined to be eligible through [facility]’s financial assistance program. Information regarding [facility]’s financial assistance program can be found by going to [email or web address] or by calling [phone].

## 2025-03-07 NOTE — PROGRESS NOTE ADULT - PROVIDER SPECIALTY LIST ADULT
Heme/Onc
Hospitalist
Heme/Onc
Infectious Disease
Palliative Care
Heme/Onc

## 2025-03-07 NOTE — DISCHARGE NOTE NURSING/CASE MANAGEMENT/SOCIAL WORK - PATIENT PORTAL LINK FT
You can access the FollowMyHealth Patient Portal offered by [facility] by registering at the following website: [email or web address]. By joining [facility]’s FollowMyHealth portal, you will also be able to view your health information using other applications (apps) compatible with our system.

## 2025-03-07 NOTE — DISCHARGE NOTE PROVIDER - NSDCFUSCHEDAPPT_GEN_ALL_CORE_FT
[facility]  [facility] [address]  Scheduled Appointment: 03/11/2025    Aman Odom  [facility]  [facility]  Scheduled Appointment: 04/28/2025

## 2025-03-07 NOTE — PROGRESS NOTE ADULT - SUBJECTIVE AND OBJECTIVE BOX
Juan Garcia MD  [facility]  Contact via Teams or text/call at [phone]    Patient is a 84y old  Female who presents with a chief complaint of colitis (06 Mar 2025 10:45)    Feels okay.  Denies chest pain, sob, nausea, vomiting diarrhea.     Patient seen and examined at bedside. No overnight events reported.     ALLERGIES:  Omnipaque 350 (Anaphylaxis)  penicillin (Unknown)    MEDICATIONS  (STANDING):  amLODIPine   Tablet 5 milliGRAM(s) Oral daily  ammonium lactate 12% Lotion 1 Application(s) Topical two times a day  atorvastatin 10 milliGRAM(s) Oral at bedtime  ciprofloxacin     Tablet 500 milliGRAM(s) Oral every 12 hours  clobetasol 0.05% Ointment 1 Application(s) Topical two times a day  enoxaparin Injectable 40 milliGRAM(s) SubCutaneous every 24 hours  levothyroxine 75 MICROGram(s) Oral daily  memantine 10 milliGRAM(s) Oral two times a day  metroNIDAZOLE    Tablet 500 milliGRAM(s) Oral every 12 hours  mupirocin 2% Ointment 1 Application(s) Topical two times a day  sertraline 50 milliGRAM(s) Oral daily  tamsulosin 0.4 milliGRAM(s) Oral daily    MEDICATIONS  (PRN):    Vital Signs Last 24 Hrs  T(F): 98.1 (07 Mar 2025 07:50), Max: 98.1 (07 Mar 2025 07:50)  HR: 81 (07 Mar 2025 07:50) (81 - 85)  BP: 100/61 (07 Mar 2025 07:50) (95/60 - 113/66)  RR: 18 (07 Mar 2025 07:50) (17 - 18)  SpO2: 97% (07 Mar 2025 07:50) (96% - 99%)  I&O's Summary    PHYSICAL EXAM:  General: NAD, Alert but confused   ENT: No gross hearing impairment, Moist mucous membranes, no thrush  Neck: Supple, No JVD  Lungs: Clear to auscultation bilaterally, good air entry, non-labored breathing  Cardio: RRR, S1/S2, No murmur  Abdomen: Soft, Nontender, Nondistended; Bowel sounds present  Extremities: No calf tenderness, No cyanosis, No pitting edema  Psych: Appropriate mood and affect    LABS:                        10.7   13.74 )-----------( 407      ( 05 Mar 2025 04:11 )             33.6     03-05    135  |  103  |  24.1  ----------------------------<  127  3.6   |  19.0  |  0.80    Ca    7.6      05 Mar 2025 04:11    Urinalysis Basic - ( 05 Mar 2025 06:50 )    Color: Dark Yellow / Appearance: Clear / S.023 / pH: x  Gluc: x / Ketone: Negative mg/dL  / Bili: Small / Urobili: 0.2 mg/dL   Blood: x / Protein: Negative mg/dL / Nitrite: Negative   Leuk Esterase: Trace / RBC: 2 /HPF / WBC 0 /HPF   Sq Epi: x / Non Sq Epi: 1 /HPF / Bacteria: Negative /HPF    Culture - Urine (collected 05 Mar 2025 06:50)  Source: Clean Catch Clean Catch (Midstream)  Final Report (06 Mar 2025 11:23):    No growth    RADIOLOGY & ADDITIONAL TESTS:    Care Discussed with Consultants/Other Providers:

## 2025-03-07 NOTE — DISCHARGE NOTE PROVIDER - NSDCCPCAREPLAN_GEN_ALL_CORE_FT
PRINCIPAL DISCHARGE DIAGNOSIS  Diagnosis: Acute colitis  Assessment and Plan of Treatment: You were admitted for diarrhea and found to have suspected colitis.  You were started on IV antibiotics with improvement of your symptoms.  You will need to undergo subacute rehabilitation.    You will need to continue oral antibiotics until 3/13/25.  You will need to follow up with your primary care physician and a gastroenterologist.    Source of Infection: Colitis   Antibiotic / Last Day: Ciprofloxacin 250 mg bid, flagyl 500 bid until 3/13/25

## 2025-03-07 NOTE — PROGRESS NOTE ADULT - SUBJECTIVE AND OBJECTIVE BOX
INFECTIOUS DISEASES AND INTERNAL MEDICINE at [facility]  =======================================================  Archie Egan MD  Diplomates American Board of Internal Medicine and Infectious Diseases  Telephone [phone]  Fax            [phone]  =======================================================    DEJAN CONTRERAS [id number]    Follow up: colitis diverticulitis      Allergies:  Omnipaque 350 (Anaphylaxis)  penicillin (Unknown)      Medications:  amLODIPine   Tablet 5 milliGRAM(s) Oral daily  ammonium lactate 12% Lotion 1 Application(s) Topical two times a day  atorvastatin 10 milliGRAM(s) Oral at bedtime  ciprofloxacin     Tablet 500 milliGRAM(s) Oral every 12 hours  clobetasol 0.05% Ointment 1 Application(s) Topical two times a day  enoxaparin Injectable 40 milliGRAM(s) SubCutaneous every 24 hours  lactated ringers. 1000 milliLiter(s) IV Continuous <Continuous>  levothyroxine 75 MICROGram(s) Oral daily  memantine 10 milliGRAM(s) Oral two times a day  metroNIDAZOLE    Tablet 500 milliGRAM(s) Oral every 12 hours  mupirocin 2% Ointment 1 Application(s) Topical two times a day  sertraline 50 milliGRAM(s) Oral daily    SOCIAL       FAMILY   FAMILY HISTORY:    REVIEW OF SYSTEMS:  CONSTITUTIONAL:  No Fever or chills  HEENT:   No diplopia or blurred vision.  No earache, sore throat or runny nose.  CARDIOVASCULAR:  No pressure, squeezing, strangling, tightness, heaviness or aching about the chest, neck, axilla or epigastrium.  RESPIRATORY:  No cough, shortness of breath, PND or orthopnea.  GASTROINTESTINAL:  No nausea, vomiting or diarrhea.  GENITOURINARY:  No dysuria, frequency or urgency. No Blood in urine  MUSCULOSKELETAL:   moves all joints  SKIN:  No change in skin, hair or nails.  NEUROLOGIC:  No paresthesias, fasciculations, seizures or weakness.  PSYCHIATRIC:  No disorder of thought or mood.  ENDOCRINE:  No heat or cold intolerance, polyuria or polydipsia.  HEMATOLOGICAL:  No easy bruising or bleeding.            Physical Exam:  I  Vital Signs Last 24 Hrs  T(C): 36.5 (07 Mar 2025 11:05), Max: 36.7 (06 Mar 2025 20:21)  T(F): 97.7 (07 Mar 2025 11:05), Max: 98.1 (07 Mar 2025 07:50)  HR: 76 (07 Mar 2025 11:05) (76 - 85)  BP: 101/62 (07 Mar 2025 11:05) (95/60 - 113/66)  BP(mean): --  RR: 18 (07 Mar 2025 11:05) (17 - 18)  SpO2: 98% (07 Mar 2025 11:05) (96% - 99%)    Parameters below as of 07 Mar 2025 11:05  Patient On (Oxygen Delivery Method): room air                GEN: NAD,   HEENT: normocephalic and atraumatic. EOMI. ALEIDA.    NECK: Supple. No carotid bruits.  No lymphadenopathy or thyromegaly.  LUNGS: Clear to auscultation.  HEART: Regular rate and rhythm without murmur.  ABDOMEN: Soft, midl diffuse tenderness  Positive bowel sounds.    : No CVA tenderness  EXTREMITIES: Without any cyanosis, clubbing, rash, lesions or edema.  MSK: no joint swelling  NEUROLOGIC: Cranial nerves II through XII are grossly intact.  PSYCHIATRIC: Appropriate affect .  SKIN: No ulceration or induration present.        Labs:  Vitals:  ======     =======================================================  Current Antibiotics:  ciprofloxacin     Tablet 500 milliGRAM(s) Oral every 12 hours  metroNIDAZOLE    Tablet 500 milliGRAM(s) Oral every 12 hours    Other medications:  amLODIPine   Tablet 5 milliGRAM(s) Oral daily  ammonium lactate 12% Lotion 1 Application(s) Topical two times a day  atorvastatin 10 milliGRAM(s) Oral at bedtime  clobetasol 0.05% Ointment 1 Application(s) Topical two times a day  enoxaparin Injectable 40 milliGRAM(s) SubCutaneous every 24 hours  lactated ringers. 1000 milliLiter(s) IV Continuous <Continuous>  levothyroxine 75 MICROGram(s) Oral daily  memantine 10 milliGRAM(s) Oral two times a day  mupirocin 2% Ointment 1 Application(s) Topical two times a day  sertraline 50 milliGRAM(s) Oral daily      =======================================================  Labs:                         Culture - Urine (collected 02-25-25 @ 23:52)  Source: Clean Catch Clean Catch (Midstream)  Final Report (02-27-25 @ 10:57):    <10,000 CFU/mL Normal Urogenital Mary    Culture - Wound Aerobic (collected 12-05-24 @ 09:56)  Source: Skin/Wound  Final Report (12-08-24 @ 07:51):    Rare Staphylococcus aureus  Organism: Staphylococcus aureus (12-08-24 @ 07:51)  Organism: Staphylococcus aureus (12-08-24 @ 07:51)    Sensitivities:      Method Type: ALISA      -  Clindamycin: R <=0.25 This isolate is presumed to be clindamycin resistant based on detection of inducible resistance. Clindamycin may still be effective in some patients.      -  Erythromycin: R >4      -  Gentamicin: S <=4 Should not be used as monotherapy      -  Oxacillin: S <=0.25 Oxacillin predicts susceptibility for dicloxacillin, methicillin, and nafcillin      -  Penicillin: R >2      -  Rifampin: S <=1 Should not be used as monotherapy      -  Tetracycline: S <=4      -  Trimethoprim/Sulfamethoxazole: S <=0.5/9.5      -  Vancomycin: S 1      Creatinine: 0.62 mg/dL (03-04-25 @ 04:16)  Creatinine: 0.75 mg/dL (03-03-25 @ 07:12)  Creatinine: 0.83 mg/dL (03-01-25 @ 09:11)  Creatinine: 0.95 mg/dL (02-28-25 @ 15:51)            WBC Count: 15.01 K/uL (03-04-25 @ 04:16)  WBC Count: 10.60 K/uL (03-03-25 @ 07:12)  WBC Count: 16.59 K/uL (03-01-25 @ 09:11)  WBC Count: 18.43 K/uL (02-28-25 @ 15:51)    SARS-CoV-2 Result: NotDetec (02-25-25 @ 21:11)      Alkaline Phosphatase: 158 U/L (03-04-25 @ 04:16)  Alkaline Phosphatase: 151 U/L (03-03-25 @ 07:12)  Alkaline Phosphatase: 173 U/L (03-01-25 @ 09:11)  Alkaline Phosphatase: 178 U/L (02-28-25 @ 15:51)  Alanine Aminotransferase (ALT/SGPT): 26 U/L (03-04-25 @ 04:16)  Alanine Aminotransferase (ALT/SGPT): 23 U/L (03-03-25 @ 07:12)  Alanine Aminotransferase (ALT/SGPT): 24 U/L (03-01-25 @ 09:11)  Alanine Aminotransferase (ALT/SGPT): 24 U/L (02-28-25 @ 15:51)  Aspartate Aminotransferase (AST/SGOT): 37 U/L (03-04-25 @ 04:16)  Aspartate Aminotransferase (AST/SGOT): 31 U/L (03-03-25 @ 07:12)  Aspartate Aminotransferase (AST/SGOT): 30 U/L (03-01-25 @ 09:11)  Aspartate Aminotransferase (AST/SGOT): 28 U/L (02-28-25 @ 15:51)  Bilirubin Total: 0.4 mg/dL (03-04-25 @ 04:16)  Bilirubin Total: 0.3 mg/dL (03-03-25 @ 07:12)  Bilirubin Total: 0.3 mg/dL (03-01-25 @ 09:11)  Bilirubin Total: 0.3 mg/dL (02-28-25 @ 15:51)

## 2025-03-10 ENCOUNTER — APPOINTMENT (OUTPATIENT)
Age: 85
End: 2025-03-10

## 2025-03-11 ENCOUNTER — APPOINTMENT (OUTPATIENT)
Dept: DERMATOLOGY | Facility: CLINIC | Age: 85
End: 2025-03-11

## 2025-03-31 ENCOUNTER — APPOINTMENT (OUTPATIENT)
Age: 85
End: 2025-03-31

## 2025-04-28 ENCOUNTER — APPOINTMENT (OUTPATIENT)
Dept: HEMATOLOGY ONCOLOGY | Facility: CLINIC | Age: 85
End: 2025-04-28

## 2025-04-28 ENCOUNTER — APPOINTMENT (OUTPATIENT)
Age: 85
End: 2025-04-28

## 2025-04-28 ENCOUNTER — NON-APPOINTMENT (OUTPATIENT)
Age: 85
End: 2025-04-28

## 2025-05-27 ENCOUNTER — APPOINTMENT (OUTPATIENT)
Age: 85
End: 2025-05-27

## 2025-06-05 NOTE — ED ADULT NURSE NOTE - NSFALLRSKOUTCOME_ED_ALL_ED
Goals:  Practice healthy stress management and mindful eating - think are you physically hungry or are you bored, stressed, emotional etc, make of list of things to do besides eat.    Try to get good quality sleep with a goal of 7-8 hours per night.  Stay physically active daily.  Recommend working up to a total of 30 minutes on 5 days/ week.  Recommend a fitness tracker.     Eat in a healthy way- eliminate trans fats, limit saturated fats and added sugars; follow the plate method - picture above.  Keep a food record (MyFitnessPal, Loseit).    A meal is 3 or more food groups; make it colorful for better nutrition.    Total Carbohydrates (in grams) = Breakfast  30    Lunch  30    Supper  30    If desired snacks 15                Blood Glucose testing max 180 at any time    (before and 2 hours after one meal)   Before eating goal 80 - 130mg/dL  2 hours after goal 80 - 150mg/dL        Fall Risk